# Patient Record
Sex: FEMALE | Race: BLACK OR AFRICAN AMERICAN | NOT HISPANIC OR LATINO | Employment: UNEMPLOYED | URBAN - METROPOLITAN AREA
[De-identification: names, ages, dates, MRNs, and addresses within clinical notes are randomized per-mention and may not be internally consistent; named-entity substitution may affect disease eponyms.]

---

## 2017-07-10 ENCOUNTER — HISTORICAL (OUTPATIENT)
Dept: ADMINISTRATIVE | Facility: HOSPITAL | Age: 52
End: 2017-07-10

## 2017-07-10 LAB
ALBUMIN SERPL-MCNC: 3.6 GM/DL (ref 3.4–5)
ALBUMIN/GLOB SERPL: 1 RATIO (ref 1–2)
ALP SERPL-CCNC: 70 UNIT/L (ref 45–117)
ALT SERPL-CCNC: 15 UNIT/L (ref 12–78)
AST SERPL-CCNC: 16 UNIT/L (ref 15–37)
BILIRUB SERPL-MCNC: 0.4 MG/DL (ref 0.2–1)
BILIRUBIN DIRECT+TOT PNL SERPL-MCNC: 0.1 MG/DL
BILIRUBIN DIRECT+TOT PNL SERPL-MCNC: 0.3 MG/DL
BUN SERPL-MCNC: 13 MG/DL (ref 7–18)
CALCIUM SERPL-MCNC: 8.4 MG/DL (ref 8.5–10.1)
CHLORIDE SERPL-SCNC: 107 MMOL/L (ref 98–107)
CO2 SERPL-SCNC: 28 MMOL/L (ref 21–32)
CREAT SERPL-MCNC: 0.8 MG/DL (ref 0.6–1.3)
DEPRECATED CALCIDIOL+CALCIFEROL SERPL-MC: 23.3 NG/ML (ref 30–80)
GLOBULIN SER-MCNC: 3.8 GM/ML (ref 2.3–3.5)
GLUCOSE SERPL-MCNC: 91 MG/DL (ref 74–106)
POTASSIUM SERPL-SCNC: 3.7 MMOL/L (ref 3.5–5.1)
PROT SERPL-MCNC: 7.4 GM/DL (ref 6.4–8.2)
SODIUM SERPL-SCNC: 141 MMOL/L (ref 136–145)
TSH SERPL-ACNC: 1.85 MIU/L (ref 0.36–3.74)

## 2017-07-17 LAB
COLOR STL: NORMAL
CONSISTENCY STL: NORMAL
HEMOCCULT SP1 STL QL: NEGATIVE

## 2017-07-18 LAB
COLOR STL: NORMAL
CONSISTENCY STL: NORMAL
HEMOCCULT SP2 STL QL: NEGATIVE

## 2017-07-21 ENCOUNTER — HISTORICAL (OUTPATIENT)
Dept: INTERNAL MEDICINE | Facility: CLINIC | Age: 52
End: 2017-07-21

## 2017-07-21 LAB
COLOR STL: NORMAL
CONSISTENCY STL: NORMAL

## 2017-07-28 ENCOUNTER — HISTORICAL (OUTPATIENT)
Dept: RADIOLOGY | Facility: HOSPITAL | Age: 52
End: 2017-07-28

## 2017-10-26 ENCOUNTER — HOSPITAL ENCOUNTER (INPATIENT)
Facility: HOSPITAL | Age: 52
LOS: 2 days | Discharge: HOME | DRG: 065 | End: 2017-10-28
Attending: EMERGENCY MEDICINE | Admitting: PSYCHIATRY & NEUROLOGY
Payer: MEDICAID

## 2017-10-26 DIAGNOSIS — Z92.82 RECEIVED TISSUE PLASMINOGEN ACTIVATOR (T-PA) LESS THAN 24 HOURS PRIOR TO ARRIVAL: ICD-10-CM

## 2017-10-26 DIAGNOSIS — Z92.82 TISSUE PLASMINOGEN ACTIVATOR (T-PA) ADMINISTERED AT OTHER FACILITY WITHIN 24 HOURS PRIOR TO CURRENT ADMISSION: ICD-10-CM

## 2017-10-26 DIAGNOSIS — R47.01 APHASIA: ICD-10-CM

## 2017-10-26 DIAGNOSIS — R20.2 NUMBNESS AND TINGLING OF RIGHT UPPER EXTREMITY: ICD-10-CM

## 2017-10-26 DIAGNOSIS — R20.0 NUMBNESS AND TINGLING OF RIGHT UPPER EXTREMITY: ICD-10-CM

## 2017-10-26 DIAGNOSIS — R53.1 RIGHT SIDED WEAKNESS: ICD-10-CM

## 2017-10-26 DIAGNOSIS — I63.9 ISCHEMIC STROKE: ICD-10-CM

## 2017-10-26 DIAGNOSIS — I63.512 ACUTE ISCHEMIC LEFT MCA STROKE: ICD-10-CM

## 2017-10-26 DIAGNOSIS — I63.9 CEREBROVASCULAR ACCIDENT (CVA), UNSPECIFIED MECHANISM: Primary | ICD-10-CM

## 2017-10-26 LAB
ABO + RH BLD: NORMAL
ALBUMIN SERPL BCP-MCNC: 3.6 G/DL
ALP SERPL-CCNC: 65 U/L
ALT SERPL W/O P-5'-P-CCNC: 11 U/L
ANION GAP SERPL CALC-SCNC: 6 MMOL/L
APTT BLDCRRT: <21 SEC
AST SERPL-CCNC: 21 U/L
BASOPHILS # BLD AUTO: 0.02 K/UL
BASOPHILS NFR BLD: 0.3 %
BILIRUB SERPL-MCNC: 0.5 MG/DL
BLD GP AB SCN CELLS X3 SERPL QL: NORMAL
BUN SERPL-MCNC: 12 MG/DL
CALCIUM SERPL-MCNC: 8.3 MG/DL
CHLORIDE SERPL-SCNC: 110 MMOL/L
CHOLEST SERPL-MCNC: 196 MG/DL
CHOLEST/HDLC SERPL: 3.8 {RATIO}
CK MB SERPL-MCNC: 0.7 NG/ML
CK MB SERPL-RTO: 0.6 %
CK SERPL-CCNC: 114 U/L
CO2 SERPL-SCNC: 20 MMOL/L
CREAT SERPL-MCNC: 0.8 MG/DL
DIFFERENTIAL METHOD: ABNORMAL
EOSINOPHIL # BLD AUTO: 0.1 K/UL
EOSINOPHIL NFR BLD: 1 %
ERYTHROCYTE [DISTWIDTH] IN BLOOD BY AUTOMATED COUNT: 17.1 %
EST. GFR  (AFRICAN AMERICAN): >60 ML/MIN/1.73 M^2
EST. GFR  (NON AFRICAN AMERICAN): >60 ML/MIN/1.73 M^2
GLUCOSE SERPL-MCNC: 97 MG/DL
GLUCOSE SERPL-MCNC: 98 MG/DL (ref 70–110)
HCT VFR BLD AUTO: 37.7 %
HDLC SERPL-MCNC: 52 MG/DL
HDLC SERPL: 26.5 %
HGB BLD-MCNC: 12.3 G/DL
IMM GRANULOCYTES # BLD AUTO: 0.02 K/UL
IMM GRANULOCYTES NFR BLD AUTO: 0.3 %
INR PPP: 1.1
LDLC SERPL CALC-MCNC: 133.6 MG/DL
LYMPHOCYTES # BLD AUTO: 1.4 K/UL
LYMPHOCYTES NFR BLD: 17.9 %
MCH RBC QN AUTO: 27.6 PG
MCHC RBC AUTO-ENTMCNC: 32.6 G/DL
MCV RBC AUTO: 85 FL
MONOCYTES # BLD AUTO: 0.4 K/UL
MONOCYTES NFR BLD: 5.6 %
NEUTROPHILS # BLD AUTO: 5.8 K/UL
NEUTROPHILS NFR BLD: 74.9 %
NONHDLC SERPL-MCNC: 144 MG/DL
NRBC BLD-RTO: 0 /100 WBC
PLATELET # BLD AUTO: 259 K/UL
PMV BLD AUTO: 10 FL
POCT GLUCOSE: 98 MG/DL (ref 70–110)
POTASSIUM SERPL-SCNC: 4 MMOL/L
PROT SERPL-MCNC: 6.9 G/DL
PROTHROMBIN TIME: 11.5 SEC
RBC # BLD AUTO: 4.46 M/UL
SODIUM SERPL-SCNC: 136 MMOL/L
TRIGL SERPL-MCNC: 52 MG/DL
TROPONIN I SERPL DL<=0.01 NG/ML-MCNC: <0.006 NG/ML
TSH SERPL DL<=0.005 MIU/L-ACNC: 0.95 UIU/ML
WBC # BLD AUTO: 7.7 K/UL

## 2017-10-26 PROCEDURE — 84443 ASSAY THYROID STIM HORMONE: CPT

## 2017-10-26 PROCEDURE — 96365 THER/PROPH/DIAG IV INF INIT: CPT

## 2017-10-26 PROCEDURE — 80061 LIPID PANEL: CPT

## 2017-10-26 PROCEDURE — 25000003 PHARM REV CODE 250: Performed by: EMERGENCY MEDICINE

## 2017-10-26 PROCEDURE — 86900 BLOOD TYPING SEROLOGIC ABO: CPT

## 2017-10-26 PROCEDURE — A4216 STERILE WATER/SALINE, 10 ML: HCPCS | Performed by: PHYSICIAN ASSISTANT

## 2017-10-26 PROCEDURE — 63600175 PHARM REV CODE 636 W HCPCS: Performed by: EMERGENCY MEDICINE

## 2017-10-26 PROCEDURE — 99285 EMERGENCY DEPT VISIT HI MDM: CPT | Mod: 25

## 2017-10-26 PROCEDURE — 25500020 PHARM REV CODE 255: Performed by: EMERGENCY MEDICINE

## 2017-10-26 PROCEDURE — 85730 THROMBOPLASTIN TIME PARTIAL: CPT

## 2017-10-26 PROCEDURE — 93010 ELECTROCARDIOGRAM REPORT: CPT | Mod: ,,, | Performed by: INTERNAL MEDICINE

## 2017-10-26 PROCEDURE — 80053 COMPREHEN METABOLIC PANEL: CPT

## 2017-10-26 PROCEDURE — 84484 ASSAY OF TROPONIN QUANT: CPT

## 2017-10-26 PROCEDURE — 20000000 HC ICU ROOM

## 2017-10-26 PROCEDURE — 96368 THER/DIAG CONCURRENT INF: CPT

## 2017-10-26 PROCEDURE — 85610 PROTHROMBIN TIME: CPT

## 2017-10-26 PROCEDURE — 82553 CREATINE MB FRACTION: CPT

## 2017-10-26 PROCEDURE — 99233 SBSQ HOSP IP/OBS HIGH 50: CPT | Mod: ,,, | Performed by: PSYCHIATRY & NEUROLOGY

## 2017-10-26 PROCEDURE — 25000003 PHARM REV CODE 250: Performed by: PHYSICIAN ASSISTANT

## 2017-10-26 PROCEDURE — 86901 BLOOD TYPING SEROLOGIC RH(D): CPT

## 2017-10-26 PROCEDURE — 82962 GLUCOSE BLOOD TEST: CPT

## 2017-10-26 PROCEDURE — 99222 1ST HOSP IP/OBS MODERATE 55: CPT | Mod: ,,, | Performed by: PSYCHIATRY & NEUROLOGY

## 2017-10-26 PROCEDURE — 25000003 PHARM REV CODE 250

## 2017-10-26 PROCEDURE — 80307 DRUG TEST PRSMV CHEM ANLYZR: CPT

## 2017-10-26 PROCEDURE — 96366 THER/PROPH/DIAG IV INF ADDON: CPT

## 2017-10-26 PROCEDURE — 81001 URINALYSIS AUTO W/SCOPE: CPT

## 2017-10-26 PROCEDURE — 83036 HEMOGLOBIN GLYCOSYLATED A1C: CPT

## 2017-10-26 PROCEDURE — 99285 EMERGENCY DEPT VISIT HI MDM: CPT | Mod: ,,, | Performed by: EMERGENCY MEDICINE

## 2017-10-26 PROCEDURE — 85025 COMPLETE CBC W/AUTO DIFF WBC: CPT

## 2017-10-26 RX ORDER — ACETAMINOPHEN 10 MG/ML
1000 INJECTION, SOLUTION INTRAVENOUS ONCE
Status: COMPLETED | OUTPATIENT
Start: 2017-10-26 | End: 2017-10-26

## 2017-10-26 RX ORDER — NICARDIPINE HYDROCHLORIDE 0.2 MG/ML
INJECTION INTRAVENOUS
Status: COMPLETED
Start: 2017-10-26 | End: 2017-10-26

## 2017-10-26 RX ORDER — NICARDIPINE HYDROCHLORIDE 0.2 MG/ML
3 INJECTION INTRAVENOUS CONTINUOUS
Status: DISCONTINUED | OUTPATIENT
Start: 2017-10-26 | End: 2017-10-27

## 2017-10-26 RX ORDER — LABETALOL HYDROCHLORIDE 5 MG/ML
10 INJECTION, SOLUTION INTRAVENOUS
Status: DISCONTINUED | OUTPATIENT
Start: 2017-10-26 | End: 2017-10-27

## 2017-10-26 RX ORDER — SODIUM CHLORIDE 0.9 % (FLUSH) 0.9 %
3 SYRINGE (ML) INJECTION EVERY 8 HOURS
Status: DISCONTINUED | OUTPATIENT
Start: 2017-10-26 | End: 2017-10-28 | Stop reason: HOSPADM

## 2017-10-26 RX ORDER — NOREPINEPHRINE BITARTRATE/D5W 4MG/250ML
PLASTIC BAG, INJECTION (ML) INTRAVENOUS
Status: DISPENSED
Start: 2017-10-26 | End: 2017-10-27

## 2017-10-26 RX ADMIN — NICARDIPINE HYDROCHLORIDE 1.5 MG/HR: 0.2 INJECTION, SOLUTION INTRAVENOUS at 12:10

## 2017-10-26 RX ADMIN — IOHEXOL 100 ML: 350 INJECTION, SOLUTION INTRAVENOUS at 11:10

## 2017-10-26 RX ADMIN — ACETAMINOPHEN 1000 MG: 10 INJECTION, SOLUTION INTRAVENOUS at 04:10

## 2017-10-26 RX ADMIN — NICARDIPINE HYDROCHLORIDE 3 MG/HR: 0.2 INJECTION, SOLUTION INTRAVENOUS at 12:10

## 2017-10-26 RX ADMIN — Medication 3 ML: at 09:10

## 2017-10-26 NOTE — HPI
53 y/o female with sudden onset of aphasia and right sided paresis @ 0730 today. In ED at [facility] was given tPA. Brought here for CTA multiphase.

## 2017-10-26 NOTE — ASSESSMENT & PLAN NOTE
Left MCA stroke  Antithrombotics for secondary stroke prevention: Antiplatelets:  Aspirin: 325 mg oral now and daily, Statins for secondary stroke prevention and hyperlipidemia, if present: Atorvastatin- 40 mg oral daily, Aggressive risk factor modification: Hypertension and Smoking, Rehab Efforts: Occupational Therapy, Speech and Language Pathology and Physical Medicine and Rehabilitation, Diagnostics: Ordered/Pending Carotid ultrasound to assess vasculature, HgbA1C to assess blood glucose levels, Lipid Profile to assess cholesterol levels, Outpatient Stroke Coagulation Panel to assess coagulation status and VTE Prophylaxis: None: Reason for No Pharmacological VTE Prophylaxis: Mechanical prophylaxis: Place SCDs

## 2017-10-26 NOTE — SUBJECTIVE & OBJECTIVE
Past Medical History:   Diagnosis Date    Hypertension      History reviewed. No pertinent surgical history.  History reviewed. No pertinent family history.  Social History   Substance Use Topics    Smoking status: Current Every Day Smoker     Packs/day: 0.50     Types: Cigarettes    Smokeless tobacco: Not on file    Alcohol use Not on file     Review of patient's allergies indicates:  No Known Allergies  Medications: I have reviewed the current medication administration record.      (Not in a hospital admission)    Review of Systems   Unable to perform ROS: Patient nonverbal     Objective:     Vital Signs (Most Recent):  Temp: 98.7 °F (37.1 °C) (10/26/17 1138)  Pulse: 73 (10/26/17 1300)  Resp: 18 (10/26/17 1300)  BP: 119/76 (10/26/17 1300)  SpO2: 98 % (10/26/17 1300)    Vital Signs Range (Last 24H):  Temp:  [98.7 °F (37.1 °C)]   Pulse:  [73-98]   Resp:  [18]   BP: (119-170)/()   SpO2:  [95 %-100 %]     Physical Exam   Constitutional: She appears well-developed and well-nourished.   HENT:   Head: Normocephalic and atraumatic.   Eyes: EOM are normal. Pupils are equal, round, and reactive to light.   Neck: Normal range of motion. Neck supple.   Cardiovascular: Normal rate and regular rhythm.    Pulmonary/Chest: Effort normal.   Musculoskeletal: Normal range of motion.   Neurological: She is alert.   Skin: Skin is warm and dry.   Psychiatric: She is withdrawn. She exhibits a depressed mood.   Vitals reviewed.      Neurological Exam:   LOC: follows requests  Language: No aphasia, Mute  Speech: No dysarthria, Mute  Visual Fields (CN II): Full  Pupils (CN III, IV, VI): PERRL  Facial Sensation (CN V): Symmetric  Facial Movement (CN VII): symmetric facial expression  Motor*: Arm Left:  Normal (5/5), Leg Left:   Normal (5/5), Arm Right:   Plegic (0/5), Leg Right:   Plegic (0/5)    NIH Stroke Scale:  Interval: baseline (upon arrival/admit)  Level of Consciousness: 0 - alert  LOC Questions: 2 - answers none  correctly  LOC Commands: 0 - performs both correctly  Best Gaze: 0 - normal  Visual: 0 - no visual loss  Facial Palsy: 0 - normal  Motor Left Arm: 0 - no drift  Motor Right Arm: 3 - no effort against gravity  Motor Left Le - no drift  Motor Right Leg: 3 - no effort against gravity  Limb Ataxia: 0 - absent  Sensory: 1 - mild to moderate loss  Best Language: 3 - mute  Dysarthria: 2 - near to unintelligible  Extinction and Inattention: 0 - no neglect  NIH Stroke Scale Total: 14      Laboratory:  CMP:   Recent Labs  Lab 10/26/17  1209   CALCIUM 8.3*   ALBUMIN 3.6   PROT 6.9      K 4.0   CO2 20*      BUN 12   CREATININE 0.8   ALKPHOS 65   ALT 11   AST 21   BILITOT 0.5     CBC:   Recent Labs  Lab 10/26/17  1209   WBC 7.70   RBC 4.46   HGB 12.3   HCT 37.7      MCV 85   MCH 27.6   MCHC 32.6     Lipid Panel:   Recent Labs  Lab 10/26/17  1209   CHOL 196   LDLCALC 133.6   HDL 52   TRIG 52       Diagnostic Results:  Brain Imaging: CT Head. Date: 10/26/2017  Acute Pathology: None  Location: N/A  Old Vascular Pathology: None  Location: N/A    Cerebrovascular Imaging: CTA Head. Date: 10/26/17  Intracranial Pathology: None  Location: N/A

## 2017-10-26 NOTE — ED NOTES
Pt assisted onto bed pan.  Perineal care given.  Pt repositioned for comfort.  Side rails up.  Will continue to monitor.

## 2017-10-26 NOTE — ED NOTES
Patient identifiers have been checked and are correct.      LOC: The patient is awake, alert, and aware of environment.   APPEARANCE: No acute distress noted.   PSYCHOSOCIAL: Patient is calm and cooperative.   SKIN: The skin is warm, dry, and intact. No bruising/discolorations noted.  RESPIRATORY: Airway is open and patent. Bilateral chest rise and fall. Respirations are spontaneous, even and unlabored. Normal effort and rate noted. No accessory muscle use noted. Breath sounds clear bilaterally.   CARDIAC: Patient has a normal rate and rhythm.   ABDOMEN: Soft and non tender to palpation. No distention noted. Bowel sounds present.  URINARY: Voids independently.   NEUROLOGIC: See neuro flow sheet.   MUSCULOSKELETAL: No obvious deformities noted.     Side rails up x2. Call light within reach. Will continue to monitor.

## 2017-10-26 NOTE — ED TRIAGE NOTES
Transfer from [place] for neuro consult. Pt began having right sided weakness and aphasia noted at 0730. Pt seen in ED at 0826, TPA given at 0900- 7 mg Bolus and 62.6 mg Infusion. TPA completed at 1000 at previous facility. Pt arrives on Cardene Infusion @ 3mg/hr.

## 2017-10-26 NOTE — CONSULTS
[facility]  Vascular Neurology  Comprehensive Stroke Center  Consult Note    Consults  Assessment/Plan:     Patient is a 52 y.o. year old female with:    Acute ischemic left MCA stroke    Left MCA stroke  Antithrombotics for secondary stroke prevention: Antiplatelets:  Aspirin: 325 mg oral now and daily, Statins for secondary stroke prevention and hyperlipidemia, if present: Atorvastatin- 40 mg oral daily, Aggressive risk factor modification: Hypertension and Smoking, Rehab Efforts: Occupational Therapy, Speech and Language Pathology and Physical Medicine and Rehabilitation, Diagnostics: Ordered/Pending Carotid ultrasound to assess vasculature, HgbA1C to assess blood glucose levels, Lipid Profile to assess cholesterol levels, Outpatient Stroke Coagulation Panel to assess coagulation status and VTE Prophylaxis: None: Reason for No Pharmacological VTE Prophylaxis: Mechanical prophylaxis: Place SCDs                                        Thrombolysis Candidate? Yes. Yes. The risks and benefits of tPA were discussed with the patient and/or family. The patient and/or family verbalized understanding of the risks and benefits and has given verbal consent for tPA. If patient was not competent or no family was available, treatment will be administered as an emergency procedure and in what we believe to be the patient's best interest.  1. Contraindications: No  2.     Interventional Revascularization Candidate?  Yes and No; No significant neurological deficit    Research Candidate? No-->   Modified Black Diamond Score - 0  Subjective:     History of Present Illness:  51 y/o female with sudden onset of aphasia and right sided paresis @ 0730 today. In ED at [facility] was given tPA. Brought here for CTA multiphase.         Past Medical History:   Diagnosis Date    Hypertension      History reviewed. No pertinent surgical history.  History reviewed. No pertinent family history.  Social History   Substance Use Topics     Smoking status: Current Every Day Smoker     Packs/day: 0.50     Types: Cigarettes    Smokeless tobacco: Not on file    Alcohol use Not on file     Review of patient's allergies indicates:  No Known Allergies  Medications: I have reviewed the current medication administration record.      (Not in a hospital admission)    Review of Systems   Unable to perform ROS: Patient nonverbal     Objective:     Vital Signs (Most Recent):  Temp: 98.7 °F (37.1 °C) (10/26/17 1138)  Pulse: 73 (10/26/17 1300)  Resp: 18 (10/26/17 1300)  BP: 119/76 (10/26/17 1300)  SpO2: 98 % (10/26/17 1300)    Vital Signs Range (Last 24H):  Temp:  [98.7 °F (37.1 °C)]   Pulse:  [73-98]   Resp:  [18]   BP: (119-170)/()   SpO2:  [95 %-100 %]     Physical Exam   Constitutional: She appears well-developed and well-nourished.   HENT:   Head: Normocephalic and atraumatic.   Eyes: EOM are normal. Pupils are equal, round, and reactive to light.   Neck: Normal range of motion. Neck supple.   Cardiovascular: Normal rate and regular rhythm.    Pulmonary/Chest: Effort normal.   Musculoskeletal: Normal range of motion.   Neurological: She is alert.   Skin: Skin is warm and dry.   Psychiatric: She is withdrawn. She exhibits a depressed mood.   Vitals reviewed.      Neurological Exam:   LOC: follows requests  Language: No aphasia, Mute  Speech: No dysarthria, Mute  Visual Fields (CN II): Full  Pupils (CN III, IV, VI): PERRL  Facial Sensation (CN V): Symmetric  Facial Movement (CN VII): symmetric facial expression  Motor*: Arm Left:  Normal (5/5), Leg Left:   Normal (5/5), Arm Right:   Plegic (0/5), Leg Right:   Plegic (0/5)    NIH Stroke Scale:  Interval: baseline (upon arrival/admit)  Level of Consciousness: 0 - alert  LOC Questions: 2 - answers none correctly  LOC Commands: 0 - performs both correctly  Best Gaze: 0 - normal  Visual: 0 - no visual loss  Facial Palsy: 0 - normal  Motor Left Arm: 0 - no drift  Motor Right Arm: 3 - no effort against  gravity  Motor Left Le - no drift  Motor Right Leg: 3 - no effort against gravity  Limb Ataxia: 0 - absent  Sensory: 1 - mild to moderate loss  Best Language: 3 - mute  Dysarthria: 2 - near to unintelligible  Extinction and Inattention: 0 - no neglect  NIH Stroke Scale Total: 14      Laboratory:  CMP:   Recent Labs  Lab 10/26/17  1209   CALCIUM 8.3*   ALBUMIN 3.6   PROT 6.9      K 4.0   CO2 20*      BUN 12   CREATININE 0.8   ALKPHOS 65   ALT 11   AST 21   BILITOT 0.5     CBC:   Recent Labs  Lab 10/26/17  1209   WBC 7.70   RBC 4.46   HGB 12.3   HCT 37.7      MCV 85   MCH 27.6   MCHC 32.6     Lipid Panel:   Recent Labs  Lab 10/26/17  1209   CHOL 196   LDLCALC 133.6   HDL 52   TRIG 52       Diagnostic Results:  Brain Imaging: CT Head. Date: 10/26/2017  Acute Pathology: None  Location: N/A  Old Vascular Pathology: None  Location: N/A    Cerebrovascular Imaging: CTA Head. Date: 10/26/17  Intracranial Pathology: None  Location: N/A          Bernie Dobbs MD  Comprehensive Stroke Center  Department of Vascular Neurology   [facility]

## 2017-10-26 NOTE — PROGRESS NOTES
Patient arrived to [facility] from [facility] by ([facility]  Type of Stroke: ischemic     TPA start time 0900 and end time 1000    Patients current symptoms include right sided weakness and aphasia

## 2017-10-26 NOTE — ED PROVIDER NOTES
Encounter Date: 10/26/2017    SCRIBE #1 NOTE: Gus IRIZARRY am scribing for, and in the presence of, Ced Maldonado MD.       History     Chief Complaint   Patient presents with    Transfer/ TPA patient     Time seen by provider: 11:48 AM    This is a 52 y.o. female who presents as stroke transfer. Patient initially evaluated at [facility], where she presented with right-sided weakness and aphasia. Patient received TPA, [facility] was consulted, and patient was transferred here for further evaluation. Patient was transferred by air on Nicardipine drip. According to chart review, patient developed weakness in the right leg which progressed to her right arm and aphasia at 7:30 AM. Patient also has history of bariatric surgery and tobacco use. Labs done at outside facility were unremarkable.      The history is provided by medical records.     Review of patient's allergies indicates:  No Known Allergies  Past Medical History:   Diagnosis Date    Hypertension      History reviewed. No pertinent surgical history.  History reviewed. No pertinent family history.  Social History   Substance Use Topics    Smoking status: Current Every Day Smoker     Packs/day: 0.50     Types: Cigarettes    Smokeless tobacco: Not on file    Alcohol use Not on file     Review of Systems   Unable to perform ROS: Patient nonverbal       Physical Exam     Initial Vitals [10/26/17 1138]   BP Pulse Resp Temp SpO2   137/89 93 18 98.7 °F (37.1 °C) 95 %      MAP       105         Physical Exam    Nursing note and vitals reviewed.  Constitutional: She appears well-developed and well-nourished. She is not diaphoretic. No distress.   HENT:   Head: Normocephalic and atraumatic.   Mouth/Throat: Oropharynx is clear and moist.   Eyes: Conjunctivae and EOM are normal.   Neck: Normal range of motion. Neck supple. No JVD present.   Cardiovascular: Normal rate, regular rhythm, normal heart sounds and intact distal pulses. Exam reveals no gallop and no  friction rub.    No murmur heard.  Pulmonary/Chest: She has no wheezes. She has no rhonchi. She has no rales.   Abdominal: Soft. Bowel sounds are normal. She exhibits no distension and no mass. There is no tenderness. There is no rebound and no guarding.   Musculoskeletal: Normal range of motion. She exhibits no tenderness.   Lymphadenopathy:     She has no cervical adenopathy.   Neurological: She is alert. No sensory deficit.   2/5 strength in right upper extremity. No verbal response, but nods and shakes head appropriately to questions; symmetric smile    Skin: Skin is warm and dry. Capillary refill takes less than 2 seconds.   Psychiatric: She has a normal mood and affect.         ED Course   Procedures  Labs Reviewed   CBC W/ AUTO DIFFERENTIAL - Abnormal; Notable for the following:        Result Value    RDW 17.1 (*)     Gran% 74.9 (*)     Lymph% 17.9 (*)     All other components within normal limits   COMPREHENSIVE METABOLIC PANEL - Abnormal; Notable for the following:     CO2 20 (*)     Calcium 8.3 (*)     Anion Gap 6 (*)     All other components within normal limits   PROTIME-INR   TSH   LIPID PANEL   POCT GLUCOSE   TYPE & SCREEN     EKG Readings: (Independently Interpreted)   Normal sinus rhythm, rate of 83 bpm, normal axis, T-wave inversions in AVL, normal T-waves.           Medical Decision Making:   History:   Old Medical Records: I decided to obtain old medical records.  Initial Assessment:   52 y.o. female presenting for neuro evaluation for acute CVA s/p TPA. Stroke code was activated.    CTA Multiphase reveals:  1.  No acute hemorrhage or major vascular infarct  2.  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  3.  Minimal atherosclerosis of the right carotid bifurcation without major stenosis.    Pr Dr. Dobbs of Neurology, patient is not a candidate for neuro intervention.  She will be admitted to Neuro Critical Care. Labs unremarkable.    Independently Interpreted Test(s):   I have ordered  and independently interpreted EKG Reading(s) - see prior notes  Clinical Tests:   Lab Tests: Reviewed and Ordered  Radiological Study: Reviewed and Ordered  Medical Tests: Reviewed and Ordered            Scribe Attestation:   Scribe #1: I performed the above scribed service and the documentation accurately describes the services I performed. I attest to the accuracy of the note.    Attending Attestation:           Physician Attestation for Scribe:      Comments: I, Dr. Ced Maldonado, personally performed the services described in this documentation. All medical record entries made by the scribe were at my direction and in my presence.  I have reviewed the chart and agree that the record reflects my personal performance and is accurate and complete. Ced Maldonado MD.  2:22 PM 10/26/2017              ED Course      Clinical Impression:   The encounter diagnosis was Cerebrovascular accident (CVA), unspecified mechanism.    Disposition:   Disposition: Admitted                        Ced Maldonado MD  10/26/17 1422       Ced Maldonado MD  10/26/17 1423

## 2017-10-27 PROBLEM — R47.01 APHASIA: Status: RESOLVED | Noted: 2017-10-27 | Resolved: 2017-10-27

## 2017-10-27 PROBLEM — I63.9 ISCHEMIC STROKE: Status: ACTIVE | Noted: 2017-10-27

## 2017-10-27 PROBLEM — R20.2 NUMBNESS AND TINGLING OF RIGHT UPPER EXTREMITY: Status: RESOLVED | Noted: 2017-10-27 | Resolved: 2017-10-27

## 2017-10-27 PROBLEM — I63.512 ACUTE ISCHEMIC LEFT MCA STROKE: Status: ACTIVE | Noted: 2017-10-27

## 2017-10-27 PROBLEM — I10 HTN (HYPERTENSION): Status: ACTIVE | Noted: 2017-10-27

## 2017-10-27 PROBLEM — R20.0 NUMBNESS AND TINGLING OF RIGHT UPPER EXTREMITY: Status: ACTIVE | Noted: 2017-10-27

## 2017-10-27 PROBLEM — R20.2 NUMBNESS AND TINGLING OF RIGHT UPPER EXTREMITY: Status: ACTIVE | Noted: 2017-10-27

## 2017-10-27 PROBLEM — R53.1 RIGHT SIDED WEAKNESS: Status: RESOLVED | Noted: 2017-10-27 | Resolved: 2017-10-27

## 2017-10-27 PROBLEM — R47.01 APHASIA: Status: ACTIVE | Noted: 2017-10-27

## 2017-10-27 PROBLEM — F17.200 SMOKING: Status: ACTIVE | Noted: 2017-10-27

## 2017-10-27 PROBLEM — R53.1 RIGHT SIDED WEAKNESS: Status: ACTIVE | Noted: 2017-10-27

## 2017-10-27 PROBLEM — R20.0 NUMBNESS AND TINGLING OF RIGHT UPPER EXTREMITY: Status: RESOLVED | Noted: 2017-10-27 | Resolved: 2017-10-27

## 2017-10-27 LAB
ALBUMIN SERPL BCP-MCNC: 3.3 G/DL
ALP SERPL-CCNC: 62 U/L
ALT SERPL W/O P-5'-P-CCNC: 10 U/L
ANION GAP SERPL CALC-SCNC: 9 MMOL/L
APTT BLDCRRT: <21 SEC
AST SERPL-CCNC: 16 U/L
BACTERIA #/AREA URNS AUTO: NORMAL /HPF
BASOPHILS # BLD AUTO: 0.02 K/UL
BASOPHILS NFR BLD: 0.4 %
BILIRUB SERPL-MCNC: 0.7 MG/DL
BILIRUB UR QL STRIP: NEGATIVE
BUN SERPL-MCNC: 10 MG/DL
CALCIUM SERPL-MCNC: 9.1 MG/DL
CHLORIDE SERPL-SCNC: 110 MMOL/L
CLARITY UR REFRACT.AUTO: ABNORMAL
CO2 SERPL-SCNC: 22 MMOL/L
COLOR UR AUTO: YELLOW
CREAT SERPL-MCNC: 0.9 MG/DL
DIASTOLIC DYSFUNCTION: NO
DIFFERENTIAL METHOD: ABNORMAL
EOSINOPHIL # BLD AUTO: 0.1 K/UL
EOSINOPHIL NFR BLD: 2.1 %
ERYTHROCYTE [DISTWIDTH] IN BLOOD BY AUTOMATED COUNT: 17.2 %
EST. GFR  (AFRICAN AMERICAN): >60 ML/MIN/1.73 M^2
EST. GFR  (NON AFRICAN AMERICAN): >60 ML/MIN/1.73 M^2
ESTIMATED AVG GLUCOSE: 91 MG/DL
GLUCOSE SERPL-MCNC: 86 MG/DL
GLUCOSE UR QL STRIP: NEGATIVE
HBA1C MFR BLD HPLC: 4.8 %
HCT VFR BLD AUTO: 35.1 %
HGB BLD-MCNC: 11.2 G/DL
HGB UR QL STRIP: ABNORMAL
IMM GRANULOCYTES # BLD AUTO: 0.01 K/UL
IMM GRANULOCYTES NFR BLD AUTO: 0.2 %
KETONES UR QL STRIP: NEGATIVE
LEUKOCYTE ESTERASE UR QL STRIP: NEGATIVE
LYMPHOCYTES # BLD AUTO: 2.2 K/UL
LYMPHOCYTES NFR BLD: 37.7 %
MAGNESIUM SERPL-MCNC: 1.8 MG/DL
MCH RBC QN AUTO: 27.3 PG
MCHC RBC AUTO-ENTMCNC: 31.9 G/DL
MCV RBC AUTO: 85 FL
MICROSCOPIC COMMENT: NORMAL
MONOCYTES # BLD AUTO: 0.5 K/UL
MONOCYTES NFR BLD: 9.3 %
NEUTROPHILS # BLD AUTO: 2.9 K/UL
NEUTROPHILS NFR BLD: 50.3 %
NITRITE UR QL STRIP: NEGATIVE
NRBC BLD-RTO: 0 /100 WBC
PH UR STRIP: 5 [PH] (ref 5–8)
PHOSPHATE SERPL-MCNC: 4 MG/DL
PLATELET # BLD AUTO: 242 K/UL
PMV BLD AUTO: 10.2 FL
POCT GLUCOSE: 101 MG/DL (ref 70–110)
POCT GLUCOSE: 79 MG/DL (ref 70–110)
POCT GLUCOSE: 84 MG/DL (ref 70–110)
POTASSIUM SERPL-SCNC: 3.5 MMOL/L
PROT SERPL-MCNC: 6.5 G/DL
PROT UR QL STRIP: NEGATIVE
RBC # BLD AUTO: 4.11 M/UL
RBC #/AREA URNS AUTO: 3 /HPF (ref 0–4)
RETIRED EF AND QEF - SEE NOTES: 65 (ref 55–65)
SODIUM SERPL-SCNC: 141 MMOL/L
SP GR UR STRIP: >1.03 (ref 1–1.03)
SQUAMOUS #/AREA URNS AUTO: 10 /HPF
URN SPEC COLLECT METH UR: ABNORMAL
UROBILINOGEN UR STRIP-ACNC: NEGATIVE EU/DL
WBC # BLD AUTO: 5.7 K/UL
WBC #/AREA URNS AUTO: 3 /HPF (ref 0–5)

## 2017-10-27 PROCEDURE — A4216 STERILE WATER/SALINE, 10 ML: HCPCS | Performed by: PHYSICIAN ASSISTANT

## 2017-10-27 PROCEDURE — 85730 THROMBOPLASTIN TIME PARTIAL: CPT

## 2017-10-27 PROCEDURE — 85025 COMPLETE CBC W/AUTO DIFF WBC: CPT

## 2017-10-27 PROCEDURE — 80053 COMPREHEN METABOLIC PANEL: CPT

## 2017-10-27 PROCEDURE — 93306 TTE W/DOPPLER COMPLETE: CPT

## 2017-10-27 PROCEDURE — G8996 SWALLOW CURRENT STATUS: HCPCS | Mod: CH

## 2017-10-27 PROCEDURE — 20600001 HC STEP DOWN PRIVATE ROOM

## 2017-10-27 PROCEDURE — 83735 ASSAY OF MAGNESIUM: CPT

## 2017-10-27 PROCEDURE — 25000003 PHARM REV CODE 250: Performed by: PHYSICIAN ASSISTANT

## 2017-10-27 PROCEDURE — 63600175 PHARM REV CODE 636 W HCPCS: Performed by: STUDENT IN AN ORGANIZED HEALTH CARE EDUCATION/TRAINING PROGRAM

## 2017-10-27 PROCEDURE — 93306 TTE W/DOPPLER COMPLETE: CPT | Mod: 26,,, | Performed by: INTERNAL MEDICINE

## 2017-10-27 PROCEDURE — 92610 EVALUATE SWALLOWING FUNCTION: CPT

## 2017-10-27 PROCEDURE — 25000003 PHARM REV CODE 250: Performed by: STUDENT IN AN ORGANIZED HEALTH CARE EDUCATION/TRAINING PROGRAM

## 2017-10-27 PROCEDURE — G8997 SWALLOW GOAL STATUS: HCPCS | Mod: CH

## 2017-10-27 PROCEDURE — 84100 ASSAY OF PHOSPHORUS: CPT

## 2017-10-27 PROCEDURE — 99233 SBSQ HOSP IP/OBS HIGH 50: CPT | Mod: ,,, | Performed by: PSYCHIATRY & NEUROLOGY

## 2017-10-27 RX ORDER — LABETALOL HYDROCHLORIDE 5 MG/ML
10 INJECTION, SOLUTION INTRAVENOUS
Status: DISCONTINUED | OUTPATIENT
Start: 2017-10-27 | End: 2017-10-28 | Stop reason: HOSPADM

## 2017-10-27 RX ORDER — LABETALOL HYDROCHLORIDE 5 MG/ML
10 INJECTION, SOLUTION INTRAVENOUS
Status: CANCELLED | OUTPATIENT
Start: 2017-10-27

## 2017-10-27 RX ORDER — NAPROXEN SODIUM 220 MG/1
81 TABLET, FILM COATED ORAL DAILY
Status: DISCONTINUED | OUTPATIENT
Start: 2017-10-27 | End: 2017-10-28 | Stop reason: HOSPADM

## 2017-10-27 RX ORDER — HEPARIN SODIUM 5000 [USP'U]/ML
5000 INJECTION, SOLUTION INTRAVENOUS; SUBCUTANEOUS EVERY 8 HOURS
Status: DISCONTINUED | OUTPATIENT
Start: 2017-10-27 | End: 2017-10-28 | Stop reason: HOSPADM

## 2017-10-27 RX ORDER — ATORVASTATIN CALCIUM 20 MG/1
40 TABLET, FILM COATED ORAL DAILY
Status: DISCONTINUED | OUTPATIENT
Start: 2017-10-27 | End: 2017-10-28 | Stop reason: HOSPADM

## 2017-10-27 RX ADMIN — HEPARIN SODIUM 5000 UNITS: 5000 INJECTION, SOLUTION INTRAVENOUS; SUBCUTANEOUS at 11:10

## 2017-10-27 RX ADMIN — ASPIRIN 81 MG CHEWABLE TABLET 81 MG: 81 TABLET CHEWABLE at 02:10

## 2017-10-27 RX ADMIN — HEPARIN SODIUM 5000 UNITS: 5000 INJECTION, SOLUTION INTRAVENOUS; SUBCUTANEOUS at 02:10

## 2017-10-27 RX ADMIN — ATORVASTATIN CALCIUM 40 MG: 20 TABLET, FILM COATED ORAL at 01:10

## 2017-10-27 RX ADMIN — Medication 3 ML: at 05:10

## 2017-10-27 RX ADMIN — Medication 3 ML: at 02:10

## 2017-10-27 RX ADMIN — Medication 3 ML: at 10:10

## 2017-10-27 NOTE — PROGRESS NOTES
[facility]  Vascular Neurology  Comprehensive Stroke Center  Progress Note    Assessment/Plan:     10/27: Day s/p tPA. NAEON. No issue per nursing. Speech and numbness improved. Exam improving. MRI brain pending. SBP well controlled and off cardene. Sating well at RA.Passsed PARTH and pending speech evaluation. AF and WBC WNL. H/H stable. All labs WNL. Pending PT/OT evaluation.    Acute ischemic left MCA stroke    A 52 year old female with PMHx of HTN and smoking who p/w RSW, numbness and aphasia s/p tPA. CTA negative. She was admitted to [facility] for close monitoring after tPA. Speech and LSW/numbness improving.     -- Continue Neurocheck Q1 while in [facility]   -- MRI brain pending   -- Keep SBP <160; BP well controlled and she is off Cardene   -- Antithrombotics for secondary stroke prevention: Antiplatelets: Aspirin: 81 mg oral if MRI negative for hemorrhagic transformation   -- Statins for secondary stroke prevention and hyperlipidemia: Atorvastatin- 40 mg oral daily  -- Aggressive risk factor modification: Hypertension, HLD and Smoking  -- Rehab Efforts: Occupational Therapy, Speech and Language Pathology and Physical Medicine and Rehabilitation: Pending evaluation   -- Speech: she passed PARTH, pending formal speech evaluation   -- Diagnostics: , A1C 4.8, TSH 0.94. Echo pending   --  VTE Prophylaxis: Mechanical prophylaxis for now, SQH pending MRI  -- Okay for stepdown to floor if MRI okay       Numbness and tingling of right upper extremity    Stroke symptome, improving   -- Continue aggressive therapy       Right sided weakness    Stroke symptoms, improving   -- Continue aggressive therapy       Tissue plasminogen activator (t-PA) administered at other facility within 24 hours prior to current admission    -- Her exam improving and SBP well controlled overnight       HTN (hypertension)    -- Keep SBP <160  -- She is off Cardene         Smoking    Stroke risk factor  -- Counseling about smoking  cessation   -- Nicotine patch PRN      Aphasia    Stroke symptom  -- Resolved        Neurologic Chief Complaint: RSW, numbness and aphasia     Subjective:     Interval History: Patient is seen for follow-up neurological assessment and treatment recommendations: Day 1 s/p tPA. NAEON. No issue per nursing. Speech and numbness improved. Exam improving. MRI brain pending. SBP well controlled and off cardene. Sating well at RA.Passsed PARTH and pending speech evaluation. AF and WBC WNL. H/H stable. All labs WNL. Pending PT/OT evaluation.      HPI, Past Medical, Family, and Social History remains the same as documented in the initial encounter.     Review of Systems   She report some numbness in her R hand otherwise negative   Scheduled Meds:   norepinephrine bitartrate-D5W        sodium chloride 0.9%  3 mL Intravenous Q8H     Continuous Infusions:   nicardipine Stopped (10/26/17 1258)     PRN Meds:labetalol    Objective:     Vital Signs (Most Recent):  Temp: 98.4 °F (36.9 °C) (10/27/17 0705)  Pulse: 61 (10/27/17 0905)  Resp: 15 (10/27/17 0905)  BP: 135/68 (10/27/17 0905)  SpO2: 98 % (10/27/17 0905)  BP Location: Left arm    Vital Signs Range (Last 24H):  Temp:  [98.1 °F (36.7 °C)-98.7 °F (37.1 °C)]   Pulse:  [51-98]   Resp:  [13-25]   BP: (114-177)/()   SpO2:  [95 %-100 %]   BP Location: Left arm    Physical Exam  General: appears well-developed and well-nourished.   Eyes: EOMI, PERRLA, normal visual acuity and grossly normal VF  Cardiovascular: normal rate, regular rhythm and normal pulses  Chest: no distress, BEAE  Abdominal: soft, ND, NT, bowel sounds are normal.   Skin: no rash   Psych/Behavior: normal mood and affect.   Musculoskeletal: appropriate tone    Neurological: AAOX3, GCS [id number], following commands, normal speech with mild dysarthria, mild R side pronation, CN II-XII grossly intact and face symm, full strength on the L side, R side 4 to 4+/5, SILT except mild decrease to LT on RUE, DTR symmetrical  +2, finger to nose normal      NIH Stroke Scale:    Level of Consciousness: 0 - alert  LOC Questions: 0 - answers both correctly  LOC Commands: 0 - performs both correctly  Best Gaze: 0 - normal  Visual: 0 - no visual loss  Facial Palsy: 0 - normal  Motor Left Arm: 0 - no drift  Motor Right Arm: 1 - drift  Motor Left Le - no drift  Motor Right Le - no drift  Limb Ataxia: 0 - absent  Sensory: 1 - mild to moderate loss  Best Language: 0 - no aphasia  Dysarthria: 1 - mild to moderate dysarthria  Extinction and Inattention: 0 - no neglect  NIH Stroke Scale Total: 3        Laboratory:  CMP:   Recent Labs  Lab 10/27/17  0300   CALCIUM 9.1   ALBUMIN 3.3*   PROT 6.5      K 3.5   CO2 22*      BUN 10   CREATININE 0.9   ALKPHOS 62   ALT 10   AST 16   BILITOT 0.7     CBC:   Recent Labs  Lab 10/27/17  0300   WBC 5.70   RBC 4.11   HGB 11.2*   HCT 35.1*      MCV 85   MCH 27.3   MCHC 31.9*     Lipid Panel:   Recent Labs  Lab 10/26/17  1209   CHOL 196   LDLCALC 133.6   HDL 52   TRIG 52     Coagulation:   Recent Labs  Lab 10/26/17  1209  10/27/17  0300   INR 1.1  --   --    APTT  --   < > <21.0   < > = values in this interval not displayed.  Platelet Aggregation Study: No results for input(s): PLTAGG, PLTAGINTERP, PLTAGREGLACO, ADPPLTAGGREG in the last 168 hours.  Hgb A1C:   Recent Labs  Lab 10/26/17  2139   HGBA1C 4.8     TSH:   Recent Labs  Lab 10/26/17  1209   TSH 0.947       Diagnostic Results:  I have personally reviewed    CTA 10/26:  No acute hemorrhage or major vascular infarct. CT angiogram of the head and neck demonstrates minimal atherosclerotic disease. No high-grade stenosis or major branch occlusion identified    Echo/EKG : pending     MRI brain: pending       Domenic Estes MD  [facility]  Department of Vascular Neurology   [facility]

## 2017-10-27 NOTE — HOSPITAL COURSE
Received tPA at [facility], transferred here for further evaluation with CTA multiphase, MRI and higher level of care. She was monitored over the course of hospital stay. Speech and numbness improved. No new neurological findings observed.  SBP well controlled and off cardene. Sating well at Ra. stepped down from the NCC on 10/27. speech evaluation cleared for regular diet. AF and WBC WNL. H/H stable. All labs WNL. PT/OT evaluation cleared for discharge to home on 10/28.

## 2017-10-27 NOTE — PROGRESS NOTES
[facility]  Neurocritical Care  Progress Note    Admit Date: 10/26/2017  Service Date: 10/27/2017  Length of Stay: 1    Subjective:     Chief Complaint: Tissue plasminogen activator (t-PA) administered at other facility within 24 hours prior to current admission    History of Present Illness: This is a 52 y.o. female with history of HTN, and bariatric surgery who presented with right-sided weakness and aphasia at OSH where she  received tPA- end time 10 am. Symptoms reportedly sudden onset of R hemiparesis with aphasia. On exam in ED she was awake, tearful, resisting movement to RUE, and poor effort to movement on left. Follows all commands but when asked to state name, unable to open mouth to speak . Exhibits increased effort to stick out tongue on command. PERRL with no gaze deficit or hemianopsia. CTA negative for any vascular abnormalities.   High suspicion for psychogenic etiology of symptoms. Due to tPa admin, will admit to ICU for 24 hour monitoring.         Hospital Course: 10/26: Patient admitted to [facility] for higher level of care s/p tPA      Interval History:  No acute events. Patient mostly back at baseline; aphasia resolved. Endorses mild right hand tingling. Passed SLP evaluation for regular diet and thin liquids. MRI brain in process.     Review of Systems  Constitutional: Denies fevers or chills.  Pulmonary: Denies shortness of breath or cough.  Cardiology: Denies chest pain or palpitations.  GI: Denies abdominal pain or constipation.  Neurologic: Denies new weakness,  headache, or paresthesias.    Objective:     Vitals:  Temp: 98.4 °F (36.9 °C) (10/27/17 0705)  Pulse: (!) 59 (10/27/17 1005)  Resp: 20 (10/27/17 1005)  BP: 129/79 (10/27/17 1005)  SpO2: 100 % (10/27/17 1005)    Temp:  [98.1 °F (36.7 °C)-98.6 °F (37 °C)] 98.4 °F (36.9 °C)  Pulse:  [51-98] 59  Resp:  [13-25] 20  SpO2:  [98 %-100 %] 100 %  BP: (114-177)/() 129/79          No intake/output data recorded.    Physical  Exam  Constitutional: Well-nourished and -developed. Calm, NAD.   Eyes: Conjunctiva clear, anicteric.  Head/Ears/Nose/Mouth/Throat/Neck: Moist mucous membranes. External ears, nose atraumatic.   Cardiovascular: Regular rhythm. No murmurs. No leg edema.  Respiratory: Comfortable respirations. Clear to auscultation.  Gastrointestinal: No hernia. Soft, nondistended, nontender. + bowel sounds.     Neurologic:   Mental status: Awake, alert, responds to questions appropriately, fluent speech, no dysarthria or aphasia  CN: PERRL, EOMI, facial expressions symmetric, hearing grossly intact  Motor: 5/5 throughout except for 4/5 RUE  Sensory: intact to fine touch throughout except for mild decrease over right hand  Coordination: normal finger-to-nose    Medications:  Continuous Scheduled  sodium chloride 0.9% 3 mL Q8H   PRN  labetalol 10 mg PRN     Labs:  Component      Latest Ref Rng & Units 10/27/2017   WBC      3.90 - 12.70 K/uL 5.70   RBC      4.00 - 5.40 M/uL 4.11   Hemoglobin      12.0 - 16.0 g/dL 11.2 (L)   Hematocrit      37.0 - 48.5 % 35.1 (L)   MCV      82 - 98 fL 85   MCH      27.0 - 31.0 pg 27.3   MCHC      32.0 - 36.0 g/dL 31.9 (L)   RDW      11.5 - 14.5 % 17.2 (H)   Platelets      150 - 350 K/uL 242   MPV      9.2 - 12.9 fL 10.2   Immature Granulocytes      0.0 - 0.5 % 0.2   Gran #      1.8 - 7.7 K/uL 2.9   Immature Grans (Abs)      0.00 - 0.04 K/uL 0.01   Lymph #      1.0 - 4.8 K/uL 2.2   Mono #      0.3 - 1.0 K/uL 0.5   Eos #      0.0 - 0.5 K/uL 0.1   Baso #      0.00 - 0.20 K/uL 0.02   nRBC      0 /100 WBC 0   Gran%      38.0 - 73.0 % 50.3   Lymph%      18.0 - 48.0 % 37.7   Mono%      4.0 - 15.0 % 9.3   Eosinophil%      0.0 - 8.0 % 2.1   Basophil%      0.0 - 1.9 % 0.4   Differential Method       Automated   Sodium      136 - 145 mmol/L 141   Potassium      3.5 - 5.1 mmol/L 3.5   Chloride      95 - 110 mmol/L 110   CO2      23 - 29 mmol/L 22 (L)   Glucose      70 - 110 mg/dL 86   BUN, Bld      6 - 20 mg/dL 10    Creatinine      0.5 - 1.4 mg/dL 0.9   Calcium      8.7 - 10.5 mg/dL 9.1   Total Protein      6.0 - 8.4 g/dL 6.5   Albumin      3.5 - 5.2 g/dL 3.3 (L)   Total Bilirubin      0.1 - 1.0 mg/dL 0.7   Alkaline Phosphatase      55 - 135 U/L 62   AST      10 - 40 U/L 16   ALT      10 - 44 U/L 10   Anion Gap      8 - 16 mmol/L 9   eGFR if African American      >60 mL/min/1.73 m:2 >60.0   eGFR if non African American      >60 mL/min/1.73 m:2 >60.0   Magnesium      1.6 - 2.6 mg/dL 1.8   Phosphorus      2.7 - 4.5 mg/dL 4.0   aPTT      21.0 - 32.0 sec <21.0   POCT Glucose      70 - 110 mg/dL 101     Assessment/Plan:     Cardiac/Vascular   HTN (hypertension)    Holding home anti-hypertensives at this time.   - sBP goal now < 220 as patient is now > 24 hours s/p TPA        Hematology   * Tissue plasminogen activator (t-PA) administered at other facility within 24 hours prior to current admission    Patient admited to [facility] for right-sided weakness and aphasia s/p tPA. Patient's exam waxing and waning with high suspicion for psychogenic etiology. CTA negative. TPA completed 1000 on 10/26  --24 hour MRI brain in process   --sBP goal < 220 as patient is now > 24 hours s/p TPA  -- will transfer to vascular neurology today if no hemorrhagic conversion on MRI  --vascular neurology following  -- regular diet  -- start Lipitor 40mg per vascular neurology  -- will plan to start aspirin 81mg daily if no hemorrhagic conversion on MRI              Prophylaxis:  Venous Thromboembolism: mechanical  Stress Ulcer: None  Ventilator Pneumonia: not applicable     Activity Orders          Activity as tolerated starting at 10/26 2038        Full Code    Monse Vasques MD  Neurocritical Care  [facility]

## 2017-10-27 NOTE — NURSING TRANSFER
Nursing Transfer Note      10/27/2017     Transfer To: 703A    Transfer via wheelchair    Transfer with cardiac monitoring    Transported by Salvatore BAXTER    Medicines sent: none    Chart send with patient: Yes    Notified: spouse    Patient reassessed at: 10/27, 1615 (date, time)    Upon arrival to floor: cardiac monitor applied, patient oriented to room, call bell in reach and bed in lowest position

## 2017-10-27 NOTE — ASSESSMENT & PLAN NOTE
Patient admited to [facility] for right-sided weakness and aphasia s/p tPA. Patient's exam waxing and waning with high suspicion for psychogenic etiology. CTA negative. TPA completed 1000 on 10/26  --24 hour MRI brain in process   --sBP goal < 220 as patient is now > 24 hours s/p TPA  -- will transfer to vascular neurology today if no hemorrhagic conversion on MRI  --vascular neurology following  -- regular diet  -- start Lipitor 40mg per vascular neurology  -- will plan to start aspirin 81mg daily if no hemorrhagic conversion on MRI

## 2017-10-27 NOTE — HPI
This is a 52 y.o. female with history of HTN, and bariatric surgery who presented with right-sided weakness and aphasia at OSH where she  received tPA- end time 10 am. Symptoms reportedly sudden onset of R hemiparesis with aphasia. On exam in ED she was awake, tearful, resisting movement to RUE, and poor effort to movement on left. Follows all commands but when asked to state name, unable to open mouth to speak . Exhibits increased effort to stick out tongue on command. PERRL with no gaze deficit or hemianopsia. CTA negative for any vascular abnormalities.   High suspicion for psychogenic etiology of symptoms. Due to tPa admin, will admit to ICU for 24 hour monitoring.

## 2017-10-27 NOTE — SUBJECTIVE & OBJECTIVE
Neurologic Chief Complaint: RSW, numbness and aphasia     Subjective:     Interval History: Patient is seen for follow-up neurological assessment and treatment recommendations: Day 1 s/p tPA. NAEON. No issue per nursing. Speech and numbness improved. Exam improving. MRI brain pending. SBP well controlled and off cardene. Sating well at RA.Passsed PARTH and pending speech evaluation. AF and WBC WNL. H/H stable. All labs WNL. Pending PT/OT evaluation.      HPI, Past Medical, Family, and Social History remains the same as documented in the initial encounter.     Review of Systems   She report some numbness in her R hand otherwise negative   Scheduled Meds:   norepinephrine bitartrate-D5W        sodium chloride 0.9%  3 mL Intravenous Q8H     Continuous Infusions:   nicardipine Stopped (10/26/17 1258)     PRN Meds:labetalol    Objective:     Vital Signs (Most Recent):  Temp: 98.4 °F (36.9 °C) (10/27/17 0705)  Pulse: 61 (10/27/17 0905)  Resp: 15 (10/27/17 0905)  BP: 135/68 (10/27/17 0905)  SpO2: 98 % (10/27/17 0905)  BP Location: Left arm    Vital Signs Range (Last 24H):  Temp:  [98.1 °F (36.7 °C)-98.7 °F (37.1 °C)]   Pulse:  [51-98]   Resp:  [13-25]   BP: (114-177)/()   SpO2:  [95 %-100 %]   BP Location: Left arm    Physical Exam  General: appears well-developed and well-nourished.   Eyes: EOMI, PERRLA, normal visual acuity and grossly normal VF  Cardiovascular: normal rate, regular rhythm and normal pulses  Chest: no distress, BEAE  Abdominal: soft, ND, NT, bowel sounds are normal.   Skin: no rash   Psych/Behavior: normal mood and affect.   Musculoskeletal: appropriate tone    Neurological: AAOX3, GCS [id number], following commands, normal speech with mild dysarthria, mild R side pronation, CN II-XII grossly intact and face symm, full strength on the L side, R side extensors 4+/5, SILT except mild decrease to LT on R side, DTR symmetrical +2, finger to nose normal      NIH Stroke Scale:    Level of Consciousness: 0  - alert  LOC Questions: 0 - answers both correctly  LOC Commands: 0 - performs both correctly  Best Gaze: 0 - normal  Visual: 0 - no visual loss  Facial Palsy: 0 - normal  Motor Left Arm: 0 - no drift  Motor Right Arm: 1 - drift  Motor Left Le - no drift  Motor Right Le - no drift  Limb Ataxia: 0 - absent  Sensory: 1 - mild to moderate loss  Best Language: 0 - no aphasia  Dysarthria: 1 - mild to moderate dysarthria  Extinction and Inattention: 0 - no neglect  NIH Stroke Scale Total: 3           Laboratory:  CMP:   Recent Labs  Lab 10/27/17  0300   CALCIUM 9.1   ALBUMIN 3.3*   PROT 6.5      K 3.5   CO2 22*      BUN 10   CREATININE 0.9   ALKPHOS 62   ALT 10   AST 16   BILITOT 0.7     CBC:   Recent Labs  Lab 10/27/17  0300   WBC 5.70   RBC 4.11   HGB 11.2*   HCT 35.1*      MCV 85   MCH 27.3   MCHC 31.9*     Lipid Panel:   Recent Labs  Lab 10/26/17  1209   CHOL 196   LDLCALC 133.6   HDL 52   TRIG 52     Coagulation:   Recent Labs  Lab 10/26/17  1209  10/27/17  0300   INR 1.1  --   --    APTT  --   < > <21.0   < > = values in this interval not displayed.  Platelet Aggregation Study: No results for input(s): PLTAGG, PLTAGINTERP, PLTAGREGLACO, ADPPLTAGGREG in the last 168 hours.  Hgb A1C:   Recent Labs  Lab 10/26/17  2139   HGBA1C 4.8     TSH:   Recent Labs  Lab 10/26/17  1209   TSH 0.947       Diagnostic Results:  I have personally reviewed    CTA 10/26:  No acute hemorrhage or major vascular infarct. CT angiogram of the head and neck demonstrates minimal atherosclerotic disease. No high-grade stenosis or major branch occlusion identified    Echo/EKG : pending     MRI brain: pending

## 2017-10-27 NOTE — PLAN OF CARE
10/27/17 1118   Discharge Assessment   Assessment Type Discharge Planning Assessment   Confirmed/corrected address and phone number on facesheet? Yes   Assessment information obtained from? Patient;Caregiver   Expected Length of Stay (days) 3   Communicated expected length of stay with patient/caregiver yes   Prior to hospitilization cognitive status: Alert/Oriented   Prior to hospitalization functional status: Independent   Current cognitive status: Alert/Oriented   Current Functional Status: Needs Assistance   Facility Arrived From: [facility]   Lives With spouse;child(min), adult   Able to Return to Prior Arrangements unable to determine at this time (comments)   Is patient able to care for self after discharge? Unable to determine at this time (comments)   Who are your caregiver(s) and their phone number(s)? Neil Scott (spouse)  [phone]   Patient's perception of discharge disposition home or selfcare;rehab facility   Readmission Within The Last 30 Days no previous admission in last 30 days   Patient currently being followed by outpatient case management? No   Patient currently receives any other outside agency services? No   Equipment Currently Used at Home none   Do you have any problems affording any of your prescribed medications? No   Is the patient taking medications as prescribed? yes   Does the patient have transportation home? Yes   Transportation Available family or friend will provide   Does the patient receive services at the [facility]? No   Discharge Plan A Home with family  (Awaiting therapy notes.  Per MD exam:  full strength on the L side, R side 4 to 4+/5,)   Discharge Plan B Rehab   Patient/Family In Agreement With Plan yes         Discharge/ My Health Packet Folder Given to patient/family:      Yes        PCP:    NONE    Pharmacy:    [id number]  [place] - [address] AT [facility] [address] [facility]  [address]  [place] [address]  Phone: [phone] Fax:  [phone]        Emergency Contacts:  Extended Emergency Contact Information  Primary Emergency Contact: Amaris Mays   [facility]  Home Phone: [phone]  Relation: Mother-Daughter  Neil Scott ()  [phone]    Insurance:  Payor: MEDICAID / Plan: [facility] / Product Type: Managed Medicaid /       Giselle Alberts RN, CCRN-K, [facility]  Neuro-Critical Care   [phone]

## 2017-10-27 NOTE — ASSESSMENT & PLAN NOTE
--admit  To [facility], tpa protocol  --negative CTA  --24 hour CT/MRI  --sbp less than 180 during tpa time  --vascular neurology following

## 2017-10-27 NOTE — H&P
[facility]  Neurocritical Care  History & Physical    Admit Date: 10/26/2017  Service Date: 10/26/2017  Length of Stay: 0    Subjective:     Chief Complaint: Tissue plasminogen activator (t-PA) administered at other facility within 24 hours prior to current admission    History of Present Illness: This is a 52 y.o. female with history of HTN, and bariatric surgery who presented with right-sided weakness and aphasia at OSH where she  received tPA- end time 10 am. Symptoms reportedly sudden onset of R hemiparesis with aphasia. On exam in ED she was awake, tearful, resisting movement to RUE, and poor effort to movement on left. Follows all commands but when asked to state name, unable to open mouth to speak . Exhibits increased effort to stick out tongue on command. PERRL with no gaze deficit or hemianopsia. CTA negative for any vascular abnormalities.   High suspicion for psychogenic etiology of symptoms. Due to tPa admin, will admit to ICU for 24 hour monitoring.         Vitals:   Temp: 98.6 °F (37 °C) (10/26/17 1902)  Pulse: 61 (10/26/17 2000)  Resp: 13 (10/26/17 2000)  BP: (!) 150/92 (10/26/17 2000)  SpO2: 100 % (10/26/17 2000)    Temp:  [98.2 °F (36.8 °C)-98.7 °F (37.1 °C)] 98.6 °F (37 °C)  Pulse:  [61-98] 61  Resp:  [13-25] 13  SpO2:  [95 %-100 %] 100 %  BP: (119-177)/() 150/92              No intake/output data recorded.     Examination:   Constitutional: Well-nourished and -developed. crying  Eyes: Conjunctiva clear, anicteric. Lids no lesions.  Head/Ears/Nose/Mouth/Throat/Neck: Moist mucous membranes. External ears, nose atraumatic.   Cardiovascular: Regular rhythm. No murmurs. No leg edema.  Respiratory: Comfortable respirations. Clear to auscultation.  Gastrointestinal: No hernia. Soft, nondistended, nontender. + bowel sounds.    Neurologic:   Awake alert, attentive to conversation, not speaking  Follows commands without delay, but does not open mouth when asked to verbally  respond  Resists tongue protrusion  Displays waxing flexibility on motor/strength exam      Today I independently reviewed pertinent medications, imaging, lab results, notably: CTA stroke multiphase  Assessment/Plan:     Hematology   * Tissue plasminogen activator (t-PA) administered at other facility within 24 hours prior to current admission    --admit  To [facility], tpa protocol  --negative CTA  --24 hour CT/MRI  --sbp less than 180 during tpa time  --vascular neurology following                  Activity Orders          None        No Order    Reny Obregon PA-C  Neurocritical Care  [facility]

## 2017-10-27 NOTE — ASSESSMENT & PLAN NOTE
A 52 year old female with PMHx of HTN and smoking who p/w RSW, numbness and aphasia s/p tPA. CTA negative. She was admitted to [facility] for close monitoring after tPA. Speech and LSW/numbness improving.     -- Continue Neurocheck Q1 while in [facility]   -- MRI brain pending   -- Keep SBP <160; BP well controlled and she is off Cardene   -- Antithrombotics for secondary stroke prevention: Antiplatelets: Aspirin: 81 mg oral if MRI negative for hemorrhagic transformation   -- Statins for secondary stroke prevention and hyperlipidemia: Atorvastatin- 40 mg oral daily  -- Aggressive risk factor modification: Hypertension, HLD and Smoking  -- Rehab Efforts: Occupational Therapy, Speech and Language Pathology and Physical Medicine and Rehabilitation: Pending evaluation   -- Speech: she passed PARTH, pending formal speech evaluation   -- Diagnostics: , A1C 4.8, TSH 0.94. Echo pending   --  VTE Prophylaxis: Mechanical prophylaxis for now, SQH pending MRI  -- Okay for stepdown to floor if MRI okay

## 2017-10-27 NOTE — ASSESSMENT & PLAN NOTE
Holding home anti-hypertensives at this time.   - sBP goal now < 220 as patient is now > 24 hours s/p TPA

## 2017-10-27 NOTE — PLAN OF CARE
Problem: SLP Goal  Goal: SLP Goal  Outcome: Ongoing (interventions implemented as appropriate)  Regular diet with thin liquids recommended.    Louise Hurtado MA/ALMA ROSA-SLP  Speech Language Pathologist  Pager [phone]  10/27/2017

## 2017-10-27 NOTE — PT/OT/SLP EVAL
"Speech Language Pathology  Evaluation    Chloé Willams   MRN: [id number]   Admitting Diagnosis: Tissue plasminogen activator (t-PA) administered at other facility within 24 hours prior to current admission    Diet recommendations: Solid Diet Level: Regular  Liquid Diet Level: Thin Feed only when awake/alert, No straws and HOB to 90 degrees    SLP Treatment Date: 10/27/17  Speech Start Time: 0835     Speech Stop Time: 0845     Speech Total (min): 10 min       TREATMENT BILLABLE MINUTES:  Eval Swallow and Oral Function 10    Diagnosis: Tissue plasminogen activator (t-PA) administered at other facility within 24 hours prior to current admission      Past Medical History:   Diagnosis Date    Hypertension      History reviewed. No pertinent surgical history.    Has the patient been evaluated by SLP for swallowing? : Yes  Keep patient NPO?: No   General Precautions: Standard, aspiration, fall          Social Hx: Patient lives with spouse  Prior diet: regular      Subjective:  "I am hungry"  Pain/Comfort  Pain Rating 1: 0/10  Pain Rating Post-Intervention 1: 0/10    Objective:        Oral Musculature Evaluation  Oral Musculature: WFL  Dentition: present and adequate  Mucosal Quality: good  Mandibular Strength and Mobility: WFL  Oral Labial Strength and Mobility: WFL  Lingual Strength and Mobility: WFL  Velar Elevation: WFL  Buccal Strength and Mobility: WFL  Volitional Cough: strong  Volitional Swallow: no delay  Voice Prior to PO Intake: wfl     Bedside Swallow Eval:  Consistencies Assessed: Thin liquids 3 teaspoons followed by one cup of water self feed, Puree 2 teaspoons and Solids 1 cracker  Oral Phase: WFL  Pharyngeal Phase: no overt clinical  signs/symptoms of aspiration and no overt clinical signs/symptoms of pharyngeal dysphagia    Additional Treatment:      FIM:                                 Assessment:  Chloé Willams is a 52 y.o. female with a medical diagnosis of Tissue plasminogen activator (t-PA) " administered at other facility within 24 hours prior to current admission and presents with no s/s of aspiration.    Do you have any cultural, spiritual, Evangelical conflicts, given your current situation?: no     Discharge recommendations: Discharge Facility/Level Of Care Needs:  (dep on pt. and ot)     Goals:    SLP Goals        Problem: SLP Goal    Goal Priority Disciplines Outcome   SLP Goal     SLP Ongoing (interventions implemented as appropriate)   Description:  Speech Language Pathology Goals  Goals expected to be met by 11/3  1.  Participate in speech language evaluation                         Plan:   Patient to be seen Therapy Frequency: 5 x/week   Plan of Care expires: 11/25/17  Plan of Care reviewed with: patient, spouse  SLP Follow-up?: Yes  SLP - Next Visit Date: 10/30/17           Louise Hurtado MA, CCC-SLP  10/27/2017

## 2017-10-27 NOTE — SUBJECTIVE & OBJECTIVE
Interval History:  No acute events. Patient back at baseline and able to converse. Passed SLP evaluation for regular diet and thin liquids. MRI brain in process.     Review of Systems  Constitutional: Denies fevers or chills.  Pulmonary: Denies shortness of breath or cough.  Cardiology: Denies chest pain or palpitations.  GI: Denies abdominal pain or constipation.  Neurologic: Denies new weakness,  headache, or paresthesias.    Objective:     Vitals:  Temp: 98.4 °F (36.9 °C) (10/27/17 0705)  Pulse: (!) 59 (10/27/17 1005)  Resp: 20 (10/27/17 1005)  BP: 129/79 (10/27/17 1005)  SpO2: 100 % (10/27/17 1005)    Temp:  [98.1 °F (36.7 °C)-98.6 °F (37 °C)] 98.4 °F (36.9 °C)  Pulse:  [51-98] 59  Resp:  [13-25] 20  SpO2:  [98 %-100 %] 100 %  BP: (114-177)/() 129/79          No intake/output data recorded.    Physical Exam  Constitutional: Well-nourished and -developed. Calm, NAD.   Eyes: Conjunctiva clear, anicteric.  Head/Ears/Nose/Mouth/Throat/Neck: Moist mucous membranes. External ears, nose atraumatic.   Cardiovascular: Regular rhythm. No murmurs. No leg edema.  Respiratory: Comfortable respirations. Clear to auscultation.  Gastrointestinal: No hernia. Soft, nondistended, nontender. + bowel sounds.     Neurologic:   Mental status: Awake, alert, responds to questions appropriately, fluent speech, no dysarthria or aphasia  CN: PERRL, EOMI, facial expressions symmetric, hearing grossly intact  Motor: 5/5 throughout  Sensory: intact to fine touch throughout  Coordination: normal finger-to-nose    Medications:  Continuous Scheduled  sodium chloride 0.9% 3 mL Q8H   PRN  labetalol 10 mg PRN     Labs:  Component      Latest Ref Rng & Units 10/27/2017   WBC      3.90 - 12.70 K/uL 5.70   RBC      4.00 - 5.40 M/uL 4.11   Hemoglobin      12.0 - 16.0 g/dL 11.2 (L)   Hematocrit      37.0 - 48.5 % 35.1 (L)   MCV      82 - 98 fL 85   MCH      27.0 - 31.0 pg 27.3   MCHC      32.0 - 36.0 g/dL 31.9 (L)   RDW      11.5 - 14.5 % 17.2 (H)    Platelets      150 - 350 K/uL 242   MPV      9.2 - 12.9 fL 10.2   Immature Granulocytes      0.0 - 0.5 % 0.2   Gran #      1.8 - 7.7 K/uL 2.9   Immature Grans (Abs)      0.00 - 0.04 K/uL 0.01   Lymph #      1.0 - 4.8 K/uL 2.2   Mono #      0.3 - 1.0 K/uL 0.5   Eos #      0.0 - 0.5 K/uL 0.1   Baso #      0.00 - 0.20 K/uL 0.02   nRBC      0 /100 WBC 0   Gran%      38.0 - 73.0 % 50.3   Lymph%      18.0 - 48.0 % 37.7   Mono%      4.0 - 15.0 % 9.3   Eosinophil%      0.0 - 8.0 % 2.1   Basophil%      0.0 - 1.9 % 0.4   Differential Method       Automated   Sodium      136 - 145 mmol/L 141   Potassium      3.5 - 5.1 mmol/L 3.5   Chloride      95 - 110 mmol/L 110   CO2      23 - 29 mmol/L 22 (L)   Glucose      70 - 110 mg/dL 86   BUN, Bld      6 - 20 mg/dL 10   Creatinine      0.5 - 1.4 mg/dL 0.9   Calcium      8.7 - 10.5 mg/dL 9.1   Total Protein      6.0 - 8.4 g/dL 6.5   Albumin      3.5 - 5.2 g/dL 3.3 (L)   Total Bilirubin      0.1 - 1.0 mg/dL 0.7   Alkaline Phosphatase      55 - 135 U/L 62   AST      10 - 40 U/L 16   ALT      10 - 44 U/L 10   Anion Gap      8 - 16 mmol/L 9   eGFR if African American      >60 mL/min/1.73 m:2 >60.0   eGFR if non African American      >60 mL/min/1.73 m:2 >60.0   Magnesium      1.6 - 2.6 mg/dL 1.8   Phosphorus      2.7 - 4.5 mg/dL 4.0   aPTT      21.0 - 32.0 sec <21.0   POCT Glucose      70 - 110 mg/dL 101

## 2017-10-27 NOTE — PLAN OF CARE
Problem: Patient Care Overview  Goal: Plan of Care Review  Outcome: Ongoing (interventions implemented as appropriate)  POC reviewed with pt and family at 0300. Pt and family verbalized understanding. Questions and concerns addressed. No acute events today. VS stable. Expressive aphasia is no longer a problem. Pt progressing toward goals. Will continue to monitor. See flowsheets for full assessment and VS info.

## 2017-10-27 NOTE — CONSULTS
[facility]  Vascular Neurology  Comprehensive Stroke Center  Tele-Consultation Note     Consult Start Time: 10/26/2017 09:24  Consult End Time: 10/26/2017 09:45     ALTEPLASE RECCOMENDATION TIME: 09:00  Please, note that patient was already receiving iv alteplase when I was contacted for this consultation, and the recommendation for iv alteplase was make by the ED physician at [facility], thus the alteplase recommendation time I am providing may not be entirely accurate.   I was informed by the referral center that there was technical difficulties connecting to the ED site.        Consults     Consulting Provider: Spoke Physician:: Milagros Curtis     Patient Location: [facility] Emergency Department  Spoke hospital nurse at bedside with patient assisting consultant.            Patient information was obtained from spouse/SO, EMS personnel and ED staff.         Subjective:      History of Present Illness: 53 y/o with HTN, no documented history of psych disorder although patient was taking Seroquel and Celexa until last June, was reportedly in her usual state of health and went to work when around 7:30 am developed right hemiparesis and inability to speak. Never had similar symptoms before.  Noted to have /100 upon arrival to the ED and started on nicardipine drip.  At this time, she is intermittently crying, follows simple commands but is non communicative and is not moving the right side.        No notes on file     Teleconsult Time Documentation     Woke up with symptoms?: no  Last known normal: Last known well (date and time):: 0730  Is the most recent last known normal correct? Yes        Does the patient take any Blood Thinners? no  Recent bleeding noted: no  Medications: Antiplatelets:  aspirin. Of note, patient stopped Seroquel and Celexa last June        Past Medical History: hypertension and smoking     Past Surgical History: no major surgeries within the last  2 weeks     Family History: no relevant family history     Social History: smoker (active)     Allergies:   No known drug allergies     Review of Systems   Reason unable to perform ROS: unable to obtain due to patient being non verbal.      Objective:   Vitals: There were no vitals taken for this visit. BP: 131/82, Respiratory Rate: 17 and Heart Rate: 76     CT READ: No. CT was not in the PACS system, reportedly normal CT as per radiologist at that facility     Physical Exam   Constitutional: She appears well-developed and well-nourished.   HENT:   Head: Normocephalic and atraumatic.   Eyes: EOM are normal. Pupils are equal, round, and reactive to light.   Neck: Normal range of motion. Neck supple.   Cardiovascular: Normal rate.    Pulmonary/Chest: Effort normal. No respiratory distress.   Abdominal: Soft. She exhibits no mass.   Genitourinary:   Genitourinary Comments: No performed   Musculoskeletal: Normal range of motion. She exhibits no deformity.   Neurological: She is alert. A sensory deficit is present. No cranial nerve deficit. She exhibits abnormal muscle tone.   Non verbal   Psychiatric:   Emotional, cries intermittently          NIH Stroke Scale:  Interval: baseline (upon arrival/admit)  Level of Consciousness: 0 - alert  LOC Questions: 2 - answers none correctly  LOC Commands: 2 - performs neither correctly  Best Gaze: 0 - normal  Visual: 0 - no visual loss  Facial Palsy: 0 - normal  Motor Left Arm: 0 - no drift  Motor Right Arm: 3 - no effort against gravity  Motor Left Le - no drift  Motor Right Leg: 3 - no effort against gravity  Limb Ataxia: 0 - absent  Sensory: 2 - severe to total loss  Best Language: 3 - mute  Dysarthria: 0 - normal articulation  Extinction and Inattention: 0 - no neglect  NIH Stroke Scale Total: 15  Modified Serena Scale:   Timeline: Prior to symptoms onset  Modified Andrews Score: 0 - no symptoms                 Assessment/Plan:   51 y/o with acute onset right hemiparesis and  expressive aphasia.   Already receiving iv alteplase.  Diagnoses: right MCA syndrome.   However, has some inconsistencies on exam, cries intermittently, and can not entirely exclude a somatoform presentation with hemiparesis/apahsia (unclear why she was on Seroquel and Celexa until last June).     No new Assessment & Plan notes have been filed under this hospital service since the last note was generated.  Service: Vascular Neurology        There were no vitals taken for this visit.  Alteplase Eligible?: Yes  Alteplase Recommendation:   Altaplase Recommendation   Altaplase Total Dose:           Bolus Dose       Intravenously over 1 minute (10% of Total Dose)   Infusion Dose       Continuous Infusion over 60 Minutes      Additional Recommendations:   1. Neurological assessment and vital signs (except temperature) every 15 minutes during Altaplase infusion.  2. Frequency of BP assessments may need to be increased if systolic BP stays >= 180 mm Hg or diastolic BP stays >= 105 mm Hg. Administer antihypertensive meds as ordered  3. Continue to monitor and control blood pressure and monitor for neurological deterioration every 15 minutes for the first hour after the infusion is stopped. Then every 30 minutes for the next 6 hours. Perform hourly monitoring from the 8th post-infusion hour until 24 hours post-infusion.  4. Temperature every 4 hours or as required.  5. Follow hospital protocol for further orders re: post tPA infusion patient management.  6. No antithrombotics or anticoagulants (including but not limited to: heparin, warfarin, aspirin, clopidigrel, or dipyridamole) for 24 hours, then start antithrombotics as ordered by treating physician    Adapted from the American Heart Association/American Stroke Association (AHA/ASA) and American Association of Neuroscience Nurses (AANN) Guidelines.      Possible Interventional Revascularization Candidate? Yes     Recommendation: NPO until after pass dysphagia screen.  Diagnostic studies: CTA Head to assess vasculature , CTA Neck/Arch to assess vasculature, HgbA1C to assess blood glucose levels, Lipid Profile to assess cholesterol levels, MRI head without contrast to assess brain parenchyma, Trans-thoracic cardiac echo to assess cardiac function/status, TSH to assess thyroid function  Consults: NCC, Rehab Consult; Physical Therapy and Stroke Team  Antithrombotics: Hold all Antithrombotics x 24 hrs after IV t-PA given  Statins: Atorvastatin- 40 mg oral daily     Disposition Recommendation: transfer to [facility] by  air  stat     Recommended the emergency room physician to have a brief discussion with the patient and/or family if available regarding the risks and benefits of treatment, and to briefly document the occurrence of that discussion in his clinical encounter note.      The attending portion of this evaluation, treatment, and documentation was performed per Clem Still MD via audiovisual.     Billing code:  (moderate to severe stroke, large areas of edema, some mimics)    · This patient has a critical neurological condition/illness, with high morbidity and mortality.  · There is a high probability for acute neurological change leading to clinical and possibly life-threatening deterioration requiring highest level of physician preparedness for urgent intervention.  · Care was coordinated with other physicians involved in the patient's care.  · Radiologic studies and laboratory data were reviewed and interpreted, and plan of care was re-assessed based on the results.  · Diagnosis, treatment options and prognosis may have been discussed with the patient and/or family members or caregiver.  · Further advanced medical management and further evaluation is warranted for his care.           Clem Still MD  Comprehensive Stroke Center  Vascular Neurology   [facility]      Routing History

## 2017-10-27 NOTE — ASSESSMENT & PLAN NOTE
A 52 year old female with PMHx of HTN and smoking who p/w RSW, numbness and aphasia s/p tPA. CTA negative. She was admitted to [facility] for close monitoring after tPA. Speech and LSW/numbness improving.     -- Continue Neurocheck Q1 while in [facility]   -- MRI brain pending   -- Keep SBP <160; BP well controlled and she is off Cardene   -- Antithrombotics for secondary stroke prevention: Antiplatelets: Aspirin: 81 mg oral if MRI negative for hemorrhagic transformation   -- Statins for secondary stroke prevention and hyperlipidemia: Atorvastatin- 40 mg oral daily  -- Aggressive risk factor modification: Hypertension, HLD and Smoking  -- Rehab Efforts: Occupational Therapy, Speech and Language Pathology and Physical Medicine and Rehabilitation: Pending evaluation   -- Speech: she passed PARTH, pending formal speech evaluation   -- Diagnostics: , A1C 4.8, TSH 0.94. Echo pending   --  VTE Prophylaxis: Mechanical prophylaxis for now, SQH pending MRI

## 2017-10-28 VITALS
HEIGHT: 66 IN | TEMPERATURE: 98 F | OXYGEN SATURATION: 95 % | SYSTOLIC BLOOD PRESSURE: 136 MMHG | WEIGHT: 169.75 LBS | BODY MASS INDEX: 27.28 KG/M2 | HEART RATE: 64 BPM | DIASTOLIC BLOOD PRESSURE: 74 MMHG | RESPIRATION RATE: 18 BRPM

## 2017-10-28 LAB
ALBUMIN SERPL BCP-MCNC: 3.2 G/DL
ALP SERPL-CCNC: 57 U/L
ALT SERPL W/O P-5'-P-CCNC: 7 U/L
AMPHETAMINES SERPL QL: NEGATIVE
ANION GAP SERPL CALC-SCNC: 9 MMOL/L
APTT BLDCRRT: 27.5 SEC
AST SERPL-CCNC: 14 U/L
BARBITURATES SERPL QL SCN: NEGATIVE
BASOPHILS # BLD AUTO: 0.04 K/UL
BASOPHILS NFR BLD: 0.7 %
BENZODIAZ SERPL QL: NEGATIVE
BILIRUB SERPL-MCNC: 0.5 MG/DL
BUN SERPL-MCNC: 18 MG/DL
CALCIUM SERPL-MCNC: 8.5 MG/DL
CANNABINOIDS SERPL QL: NEGATIVE
CHLORIDE SERPL-SCNC: 109 MMOL/L
CO2 SERPL-SCNC: 22 MMOL/L
COCAINE, BLOOD: NEGATIVE
CREAT SERPL-MCNC: 0.8 MG/DL
DIFFERENTIAL METHOD: ABNORMAL
EOSINOPHIL # BLD AUTO: 0.2 K/UL
EOSINOPHIL NFR BLD: 3.8 %
ERYTHROCYTE [DISTWIDTH] IN BLOOD BY AUTOMATED COUNT: 17.2 %
EST. GFR  (AFRICAN AMERICAN): >60 ML/MIN/1.73 M^2
EST. GFR  (NON AFRICAN AMERICAN): >60 ML/MIN/1.73 M^2
ETHANOL SERPL-MCNC: NEGATIVE MG/DL
GLUCOSE SERPL-MCNC: 86 MG/DL
HCT VFR BLD AUTO: 36.3 %
HGB BLD-MCNC: 11.8 G/DL
IMM GRANULOCYTES # BLD AUTO: 0.01 K/UL
IMM GRANULOCYTES NFR BLD AUTO: 0.2 %
LYMPHOCYTES # BLD AUTO: 2.4 K/UL
LYMPHOCYTES NFR BLD: 42 %
MAGNESIUM SERPL-MCNC: 1.6 MG/DL
MCH RBC QN AUTO: 27.6 PG
MCHC RBC AUTO-ENTMCNC: 32.5 G/DL
MCV RBC AUTO: 85 FL
METHADONE SERPL QL SCN: NEGATIVE
MONOCYTES # BLD AUTO: 0.5 K/UL
MONOCYTES NFR BLD: 8.4 %
NEUTROPHILS # BLD AUTO: 2.6 K/UL
NEUTROPHILS NFR BLD: 44.9 %
NRBC BLD-RTO: 0 /100 WBC
OPIATES SERPL QL SCN: NEGATIVE
PCP SERPL QL SCN: NEGATIVE
PHOSPHATE SERPL-MCNC: 3.7 MG/DL
PLATELET # BLD AUTO: 251 K/UL
PMV BLD AUTO: 10.6 FL
POCT GLUCOSE: 151 MG/DL (ref 70–110)
POTASSIUM SERPL-SCNC: 3.7 MMOL/L
PROPOXYPH SERPL QL: NEGATIVE
PROT SERPL-MCNC: 6.3 G/DL
RBC # BLD AUTO: 4.27 M/UL
SODIUM SERPL-SCNC: 140 MMOL/L
WBC # BLD AUTO: 5.72 K/UL

## 2017-10-28 PROCEDURE — 80053 COMPREHEN METABOLIC PANEL: CPT

## 2017-10-28 PROCEDURE — 25000003 PHARM REV CODE 250: Performed by: STUDENT IN AN ORGANIZED HEALTH CARE EDUCATION/TRAINING PROGRAM

## 2017-10-28 PROCEDURE — 25000003 PHARM REV CODE 250: Performed by: PHYSICIAN ASSISTANT

## 2017-10-28 PROCEDURE — 97530 THERAPEUTIC ACTIVITIES: CPT

## 2017-10-28 PROCEDURE — 97161 PT EVAL LOW COMPLEX 20 MIN: CPT

## 2017-10-28 PROCEDURE — 99232 SBSQ HOSP IP/OBS MODERATE 35: CPT | Mod: ,,, | Performed by: PSYCHIATRY & NEUROLOGY

## 2017-10-28 PROCEDURE — 85730 THROMBOPLASTIN TIME PARTIAL: CPT

## 2017-10-28 PROCEDURE — 97165 OT EVAL LOW COMPLEX 30 MIN: CPT

## 2017-10-28 PROCEDURE — 97116 GAIT TRAINING THERAPY: CPT

## 2017-10-28 PROCEDURE — 83735 ASSAY OF MAGNESIUM: CPT

## 2017-10-28 PROCEDURE — A4216 STERILE WATER/SALINE, 10 ML: HCPCS | Performed by: PHYSICIAN ASSISTANT

## 2017-10-28 PROCEDURE — 84100 ASSAY OF PHOSPHORUS: CPT

## 2017-10-28 PROCEDURE — 63600175 PHARM REV CODE 636 W HCPCS: Performed by: STUDENT IN AN ORGANIZED HEALTH CARE EDUCATION/TRAINING PROGRAM

## 2017-10-28 PROCEDURE — 36415 COLL VENOUS BLD VENIPUNCTURE: CPT

## 2017-10-28 PROCEDURE — 85025 COMPLETE CBC W/AUTO DIFF WBC: CPT

## 2017-10-28 RX ORDER — NICOTINE 7MG/24HR
1 PATCH, TRANSDERMAL 24 HOURS TRANSDERMAL DAILY
Refills: 0 | COMMUNITY
Start: 2017-10-28 | End: 2017-11-27

## 2017-10-28 RX ORDER — AMLODIPINE BESYLATE 10 MG/1
10 TABLET ORAL DAILY
Qty: 30 TABLET | Refills: 5 | Status: SHIPPED | OUTPATIENT
Start: 2017-10-28 | End: 2022-12-13

## 2017-10-28 RX ORDER — ATORVASTATIN CALCIUM 40 MG/1
40 TABLET, FILM COATED ORAL DAILY
Qty: 90 TABLET | Refills: 3 | Status: SHIPPED | OUTPATIENT
Start: 2017-10-29 | End: 2022-07-12 | Stop reason: SDUPTHER

## 2017-10-28 RX ORDER — NAPROXEN SODIUM 220 MG/1
81 TABLET, FILM COATED ORAL DAILY
Refills: 0 | COMMUNITY
Start: 2017-10-29 | End: 2018-10-29

## 2017-10-28 RX ADMIN — HEPARIN SODIUM 5000 UNITS: 5000 INJECTION, SOLUTION INTRAVENOUS; SUBCUTANEOUS at 06:10

## 2017-10-28 RX ADMIN — Medication 3 ML: at 06:10

## 2017-10-28 RX ADMIN — ATORVASTATIN CALCIUM 40 MG: 20 TABLET, FILM COATED ORAL at 08:10

## 2017-10-28 RX ADMIN — ASPIRIN 81 MG CHEWABLE TABLET 81 MG: 81 TABLET CHEWABLE at 08:10

## 2017-10-28 NOTE — PLAN OF CARE
Problem: Patient Care Overview  Goal: Plan of Care Review  Outcome: Outcome(s) achieved Date Met: 10/28/17  Patient awake, alert and responsive, oriented x 4. Vital signs within parameters/ Medications administered as ordered. Discharge papers given to patient, reviewed medications, discontinued Iv's. Verbalized understanding.

## 2017-10-28 NOTE — PT/OT/SLP EVAL
"Physical Therapy  Evaluation    Chloé Willams   MRN: [id number]   Admitting Diagnosis: Tissue plasminogen activator (t-PA) administered at other facility within 24 hours prior to current admission    PT Received On: 10/28/17  PT Start Time: 0740     PT Stop Time: 0800    PT Total Time (min): 20 min       Billable Minutes:  Evaluation 10 and Gait [id number]    Diagnosis: Tissue plasminogen activator (t-PA) administered at other facility within 24 hours prior to current admission  L MCA stroke    Past Medical History:   Diagnosis Date    Hypertension       History reviewed. No pertinent surgical history.    Referring physician: Basia Wu NP  Date referred to PT: 10/27/17    General Precautions: Standard, aspiration, fall  Orthopedic Precautions: N/A   Braces: N/A       Patient History:  Pt states living in [place], [place] in a 1st floor apartment with no SHAD with her . Pt states being independent with all ADLs and functional mobility PTA with no DME.  Equipment used at home:  No Assistive Device.  Equipment owned but not using:  Axillary crutches.  Activity level: active.   Work: yes, sitter.   Drive: yes.   Cooking/Cleaning/Managing Home: yes.   Hand dominance: right.   Wears glasses:  Reading glasses  Hobbies: fishing.    Previous Level of Function:  Ambulation Skills: independent  Transfer Skills: independent  ADL Skills: independent  Work/Leisure Activity: independent    Subjective:  Communicated with RN prior to session.  "From not being able to speak, to being able to speak to you, I'd say I'm doing good."    Chief Complaint: RLE weakness  Patient goals: to return home    Pain/Comfort  Pain Rating 1: 0/10  Pain Rating Post-Intervention 1: 0/10    Objective:   Patient found with: telemetry     Cognitive Exam:  Oriented to: Person, Place, Time and Situation    Follows Commands/attention: Follows multistep  commands  Communication: clear/fluent  Safety awareness/insight to disability: " intact    Physical Exam:  Postural examination/scapula alignment: Rounded shoulder and Head forward    Skin integrity: Visible skin intact  Edema: None noted    Sensation:   Intact  light/touch BLE (RLE and RUE decreased compared to LUE and LLE) face intact on both sides    Lower Extremity Range of Motion:  Right Lower Extremity: WFL  Left Lower Extremity: WFL    Lower Extremity Strength:  Right Lower Extremity: grossly 4/5  Left Lower Extremity: WFL     Functional Mobility:  Pt found supine, with  present.  Bed Mobility:       Supine <> Sit: From right sidelying: [place]   Sit <> Supine: To right sidelying: [place]    Sitting Balance at Edge of Bed:   Assistance Level Required: [place]   Time: 5 minutes   Postural deviations noted: Rounded shoulder, Head forward and Posterior pelvic tilt     Transfers:    Sit <> Stand: x 1 trial from EOB with Stand-by Assistance with No Assistive Device    Stand <> Sit: x 1 trial to EOB with  Supervision with No Assistive Device   Comments: Pt requires increased time to perform mobility     Gait:   Distance Ambulated: 50ft    Assistance level: Stand-by Assistance   Assistive Device used:  No Assistive Device   Gait Pattern: 2-point gait   Gait Deviation(s): decreased jia, decreased step length, decreased stride length, decreased swing-to-stance ratio, decreased toe-to-floor clearance and decreased weight-shifting ability   Impairments due to: decreased strength of RLE   Comments: Pt with decreased R knee control, decreased heel contact at initial contact and increased left lateral sway at initial swing. Pt with increasing deviations with fatigue    Therapeutic Activities & Exercises:   Education:  Patient provided with daily orientation and goals of this PT session. Patient and family agreed to participate in session.Patient and family aware of patient's deficits and therapy progression. They were educated to transfer with RN/PCT only; SBA of 1 person.  Time provided for therapeutic counseling and discussion of health disposition. All questions answered to patient's and family's satisfaction, within scope of PT practice; voiced no other concerns. White board updated in patient's room, RN notified of session.    Patient left supine, with all lines intact, call button in reach, RN notified and  present    AM-PAC 6 CLICK MOBILITY  How much help from another person does this patient currently need?   1 = Unable, Total/Dependent Assistance  2 = A lot, Maximum/Moderate Assistance  3 = A little, Minimum/Contact Guard/Supervision  4 = None, Modified [place]/Independent    Turning over in bed (including adjusting bedclothes, sheets and blankets)?: 4  Sitting down on and standing up from a chair with arms (e.g., wheelchair, bedside commode, etc.): 4  Moving from lying on back to sitting on the side of the bed?: 4  Moving to and from a bed to a chair (including a wheelchair)?: 4  Need to walk in hospital room?: 3  Climbing 3-5 steps with a railing?: 3  Total Score: 22     AM-PAC Raw Score CMS G-Code Modifier Level of Impairment Assistance   6 % Total / Unable   7 - 9 CM 80 - 100% Maximal Assist   10 - 14 CL 60 - 80% Moderate Assist   15 - 19 CK 40 - 60% Moderate Assist   20 - 22 CJ 20 - 40% Minimal Assist   23 CI 1-20% SBA / CGA   24 CH 0% Independent/ Mod I     Assessment:   Chloé Willams is a 52 y.o. female with a medical diagnosis of Tissue plasminogen activator (t-PA) administered at other facility within 24 hours prior to current admission. Pt presents with decreased RLE strength and gait instability. Ms. Willams's deficits affect their ability to safely and independently participate in self-care tasks and functional mobility. Due to her physical therapy diagnosis of debility and deconditioning, they continue to benefit from acute PT services to address the following limitations: high fall risk, weakness, instability, the need for supervised  instructions of exercise, and the decreased ability to perform functional activities which they were able to complete PTA. Education was provided to patient & family regarding importance of continued participation in therapy, patient's progress and discharge disposition. Pt would benefit from OPPT upon discharge to improve quality of life and focus on recovery of impairments.     Rehab identified problem list/impairments: Rehab identified problem list/impairments: weakness, impaired balance, gait instability    Rehab potential is good.    Activity tolerance: Good    Discharge recommendations: Discharge Facility/Level Of Care Needs: outpatient PT     Barriers to discharge: Barriers to Discharge: None    Equipment recommendations: Equipment Needed After Discharge: bath bench     GOALS:    Physical Therapy Goals        Problem: Physical Therapy Goal    Goal Priority Disciplines Outcome Goal Variances Interventions   Physical Therapy Goal     PT/OT, PT Ongoing (interventions implemented as appropriate)     Description:  Goals to be met by: 11/3/17     Patient will increase functional independence with mobility by performing:    Sit to stand transfer with Modified [place] using No Assistive Device.  Bed to chair transfer via Stand Pivot with Modified [place] using No Assistive Device.  Gait  x 100 feet with Modified [place] using No Assistive Device, with increased R knee control, heel contact and appropriate weight shift with <25% cues.   Dynamic standing for 5 minutes with Modified [place] using No Assistive Device.  Able to tolerate exercise for 15-20 reps with independence.  Patient and family able to teachback stroke & positioning education independently.                    PLAN:    Patient to be seen 5 x/week to address the above listed problems via gait training, therapeutic activities, therapeutic exercises, neuromuscular re-education  Plan of Care expires: 11/27/17  Plan of Care reviewed  with: patient, spouse    Personal factors/comorbidities: HTN. Due to the listed comorbidities the patient cannot fully participate in PT POC.  Body systems elements affected: lower extremities, trunk, neuromuscular system,  Clinical Presentation: stable,   Functional Outcome Tools: AMPAC, ROM, MMT, orientation,    Evaluation Complexity Level: Low    Hnany Riggs PT, DPT  10/28/2017  Pager: [phone]

## 2017-10-28 NOTE — SUBJECTIVE & OBJECTIVE
Neurologic Chief Complaint: RSW, numbness and aphasia     Subjective:     Interval History: Patient is seen for follow-up neurological assessment and treatment recommendations: Day 1 s/p tPA. NAEON. No issue per nursing. Speech and numbness improved. Exam improving. MRI brain pending. SBP well controlled and off cardene. Sating well at [facility].Passsed PARTH and pending speech evaluation. AF and WBC WNL. H/H stable. All labs WNL. Pending PT/OT evaluation.      HPI, Past Medical, Family, and Social History remains the same as documented in the initial encounter.     Review of Systems   Constitutional: Negative for chills, diaphoresis and fever.   HENT: Negative for drooling, trouble swallowing and voice change.    Eyes: Negative for photophobia and visual disturbance.   Respiratory: Negative for cough, chest tightness, shortness of breath and wheezing.    Cardiovascular: Negative for chest pain and palpitations.   Gastrointestinal: Negative for abdominal pain, nausea and vomiting.   Endocrine: Negative for polyphagia and polyuria.   Genitourinary: Negative for dysuria, hematuria and urgency.   Musculoskeletal: Negative for arthralgias, joint swelling, myalgias and neck pain.   Skin: Negative for pallor and rash.   Neurological: Positive for facial asymmetry, speech difficulty, weakness and numbness. Negative for dizziness and headaches.   Psychiatric/Behavioral: Negative for agitation and confusion. The patient is not nervous/anxious.       She report some numbness in her R hand otherwise negative   Scheduled Meds:   aspirin  81 mg Oral Daily    atorvastatin  40 mg Oral Daily    heparin (porcine)  5,000 Units Subcutaneous Q8H    sodium chloride 0.9%  3 mL Intravenous Q8H     Continuous Infusions:     PRN Meds:labetalol    Objective:     Vital Signs (Most Recent):  Temp: 98.3 °F (36.8 °C) (10/28/17 0725)  Pulse: 64 (10/28/17 1100)  Resp: 18 (10/28/17 0725)  BP: 136/74 (10/28/17 0725)  SpO2: 95 % (10/28/17 0725)  BP  Location: Right arm    Vital Signs Range (Last 24H):  Temp:  [97.6 °F (36.4 °C)-98.9 °F (37.2 °C)]   Pulse:  [58-74]   Resp:  [15-27]   BP: (124-157)/(74-86)   SpO2:  [94 %-100 %]   BP Location: Right arm    Physical Exam   Constitutional: She is oriented to person, place, and time. She appears well-developed and well-nourished. She is cooperative. No distress.   HENT:   Head: Normocephalic and atraumatic.   Eyes: Conjunctivae and EOM are normal. Pupils are equal, round, and reactive to light.   Neck: Normal range of motion. Neck supple.   Cardiovascular: Normal rate and regular rhythm.  Exam reveals gallop and S3.    No murmur heard.  Pulmonary/Chest: Effort normal and breath sounds normal. No tachypnea. She has no wheezes. She has no rales. She exhibits no tenderness.   Abdominal: Soft. Bowel sounds are normal. She exhibits no distension. There is no tenderness.   Musculoskeletal: Normal range of motion. She exhibits no edema or tenderness.   Neurological: She is alert and oriented to person, place, and time.   Skin: No rash noted. She is not diaphoretic. No cyanosis or erythema.   Psychiatric: She has a normal mood and affect. Her behavior is normal.     General: appears well-developed and well-nourished.   Eyes: EOMI, PERRLA, normal visual acuity and grossly normal VF  Cardiovascular: normal rate, regular rhythm and normal pulses  Chest: no distress, BEAE  Abdominal: soft, ND, NT, bowel sounds are normal.   Skin: no rash   Psych/Behavior: normal mood and affect.   Musculoskeletal: appropriate tone    Neurological: AAOX3, GCS E4V5M6, following commands, normal speech with mild dysarthria, mild R side pronation, CN II-XII grossly intact and face symm, full strength on the L side, R side extensors 4+/5, SILT except mild decrease to LT on R side, DTR symmetrical +2, finger to nose normal      NIH Stroke Scale:  Interval: 7 days or at discharge (whichever comes first)  Level of Consciousness: 0 - alert  LOC  Questions: 0 - answers both correctly  LOC Commands: 0 - performs both correctly  Best Gaze: 0 - normal  Visual: 0 - no visual loss  Facial Palsy: 1 - minor  Motor Left Arm: 0 - no drift  Motor Right Arm: 0 - no drift  Motor Left Le - no drift  Motor Right Le - no drift  Limb Ataxia: 0 - absent  Sensory: 1 - mild to moderate loss  Best Language: 0 - no aphasia  Dysarthria: 1 - mild to moderate dysarthria  Extinction and Inattention: 0 - no neglect  NIH Stroke Scale Total: 3      Neurologic Chief Complaint: RSW, numbness and aphasia     Subjective:     HPI, Past Medical, Family, and Social History remains the same as documented in the initial encounter.     Review of Systems   She report some numbness in her R hand otherwise negative   Scheduled Meds:   aspirin  81 mg Oral Daily    atorvastatin  40 mg Oral Daily    heparin (porcine)  5,000 Units Subcutaneous Q8H    sodium chloride 0.9%  3 mL Intravenous Q8H     Continuous Infusions:     PRN Meds:labetalol    Objective:     Vital Signs (Most Recent):  Temp: 98.3 °F (36.8 °C) (10/28/17 0725)  Pulse: 64 (10/28/17 1100)  Resp: 18 (10/28/17 0725)  BP: 136/74 (10/28/17 0725)  SpO2: 95 % (10/28/17 0725)  BP Location: Right arm    Vital Signs Range (Last 24H):  Temp:  [97.6 °F (36.4 °C)-98.9 °F (37.2 °C)]   Pulse:  [58-74]   Resp:  [15-27]   BP: (124-157)/(74-86)   SpO2:  [94 %-100 %]   BP Location: Right arm    Physical Exam  General: appears well-developed and well-nourished.   Eyes: EOMI, PERRLA, normal visual acuity and grossly normal VF  Cardiovascular: normal rate, regular rhythm and normal pulses  Chest: no distress, BEAE  Abdominal: soft, ND, NT, bowel sounds are normal.   Skin: no rash   Psych/Behavior: normal mood and affect.   Musculoskeletal: appropriate tone    Neurological: AAOX3, GCS E4V5M6, following commands, normal speech with mild dysarthria, mild R side pronation, CN II-XII grossly intact and face symm, full strength on the L side, R side  extensors 4+/5, SILT except mild decrease to LT on R side, DTR symmetrical +2, finger to nose normal      NIH Stroke Scale:    Level of Consciousness: 0 - alert  LOC Questions: 0 - answers both correctly  LOC Commands: 0 - performs both correctly  Best Gaze: 0 - normal  Visual: 0 - no visual loss  Facial Palsy: 0 - normal  Motor Left Arm: 0 - no drift  Motor Right Arm: 1 - drift  Motor Left Le - no drift  Motor Right Le - no drift  Limb Ataxia: 0 - absent  Sensory: 1 - mild to moderate loss  Best Language: 0 - no aphasia  Dysarthria: 1 - mild to moderate dysarthria  Extinction and Inattention: 0 - no neglect  NIH Stroke Scale Total: 3    Laboratory:  CMP:     Recent Labs  Lab 10/28/17  0425   CALCIUM 8.5*   ALBUMIN 3.2*   PROT 6.3      K 3.7   CO2 22*      BUN 18   CREATININE 0.8   ALKPHOS 57   ALT 7*   AST 14   BILITOT 0.5     CBC:     Recent Labs  Lab 10/28/17  0425   WBC 5.72   RBC 4.27   HGB 11.8*   HCT 36.3*      MCV 85   MCH 27.6   MCHC 32.5     Lipid Panel:     Recent Labs  Lab 10/26/17  1209   CHOL 196   LDLCALC 133.6   HDL 52   TRIG 52     Coagulation:   Recent Labs  Lab 10/26/17  1209  10/28/17  0425   INR 1.1  --   --    APTT  --   < > 27.5       Recent Labs  Lab 10/26/17  2139   HGBA1C 4.8       Diagnostic Results:    CTA, 10/26/17:  No acute hemorrhage or major vascular infarct. CT angiogram of the head and neck demonstrates minimal atherosclerotic disease. No high-grade stenosis or major branch occlusion identified    MRI brain, 10.27/17:    Acute lacunar-type infarct along the posterior margin of the left putamen. No hemorrhage or mass effect. Modest chronic microvascular ischemic change.    ECG, 10/26/17:  Sinus bradycardia, otherwise normal    TTE, 10/27/17:   1 - Normal left ventricular systolic function (EF 60-65%).     2 - Normal left ventricular diastolic function.     3 - Normal right ventricular systolic function .

## 2017-10-28 NOTE — ASSESSMENT & PLAN NOTE
A 52 year old female with PMHx of HTN and smoking who p/w RSW, numbness and aphasia s/p tPA. CTA negative. She was admitted to [facility] for close monitoring after tPA. Speech and LSW/numbness improving. Stepped down from [facility].    - sBP <160; BP well controlled, currently at 144/86, she is off Cardene,   - Antithrombotics for secondary stroke prevention: Antiplatelets: Aspirin: 81 mg oral if MRI negative for hemorrhagic transformation   - secondary stroke prevention:  -- continue ASA 81 mg oral daily  -- continue Atorvastatin- 40 mg oral daily  -- Aggressive risk factor modification: Hypertension, HLD and Smoking   -- Rehab Efforts: Occupational Therapy, Speech and Language Pathology and Physical Medicine and Rehabilitation: Pending evaluation   -- Speech: cleared for regular diet and thin liquids   --  VTE Prophylaxis: Mechanical prophylaxis for now  -- Diagnostics: , A1C 4.8, TSH 0.94. Echo WNL, MRI did not reveal hemorrhagic conversion

## 2017-10-28 NOTE — PT/OT/SLP DISCHARGE
Occupational Therapy Discharge Summary    Chloé Willams  MRN: [id number]   Tissue plasminogen activator (t-PA) administered at other facility within 24 hours prior to current admission   Patient Discharged from acute Occupational Therapy on 10/28.  Please refer to prior OT note dated on 10/28 for functional status.     Assessment:   Patient appropriate for care in another setting.  GOALS:    Occupational Therapy Goals        Problem: Occupational Therapy Goal    Goal Priority Disciplines Outcome Interventions   Occupational Therapy Goal     OT, PT/OT     Description:  Goals set 10/28 to be addressed for 7 days with expiration date, 11/4  Patient will increase functional independence with ADLs by performing:  Patient will demonstrate stand pivot transfers with modified independence.   Not met  Patient will demonstrate grooming while standing with modified independence.   Not met  Patient will demonstrate upper body dressing with modified independence.   Not met  Patient will demonstrate lower body dressing with modified independence.   Not met  Patient will demonstrate toileting with modified independence.   Not met  Patient will demonstrate bathing while seated EOB with modified independence.   Not met  Patient and/or patient's family will verbalize understanding of stroke prevention guidelines, personal risk factors and stroke warning signs via teachback method.  Not met                         Reasons for Discontinuation of Therapy Services  Transfer to alternate level of care.      Plan:  Patient Discharged to: Outpatient Therapy Services.  ROSALIND Antoine  10/28/2017

## 2017-10-28 NOTE — DISCHARGE SUMMARY
[facility]  Vascular Neurology  Comprehensive Stroke Center  Discharge Summary     Summary:     Admit Date: 10/26/2017 11:40 AM    Discharge Date and Time:  10/28/2017 6:57 PM    Attending Physician: Reynold Cabrera MD    Discharge Provider: Muriel Loja MD    History of Present Illness: 51 y/o female with sudden onset of aphasia and right sided paresis @ 0730 today. In ED at [facility] was given tPA. Brought here for CTA multiphase.    Hospital Course (synopsis of major diagnoses, care, treatment, and services provided during the course of the hospital stay): Received tPA at OSH, transferred here for further evaluation with CTA multiphase, MRI and higher level of care. She was monitored over the course of hospital stay. Speech and numbness improved. No new neurological findings observed.  SBP well controlled and off cardene. Sating well at [facility]. stepped down from the [facility] on 10/27. speech evaluation cleared for regular diet. AF and WBC WNL. H/H stable. All labs WNL. PT/OT evaluation cleared for discharge to home on 10/28.      Neurologic Chief Complaint: RSW, numbness and aphasia     Subjective:     Interval History: Patient is seen for follow-up neurological assessment and treatment recommendations: Day 1 s/p tPA. NAEON. No issue per nursing. Speech and numbness improved. Exam improving. MRI brain pending. SBP well controlled and off cardene. Sating well at [facility].Passsed PARTH and pending speech evaluation. AF and WBC WNL. H/H stable. All labs WNL. Pending PT/OT evaluation.      HPI, Past Medical, Family, and Social History remains the same as documented in the initial encounter.     Review of Systems   Constitutional: Negative for chills, diaphoresis and fever.   HENT: Negative for drooling, trouble swallowing and voice change.    Eyes: Negative for photophobia and visual disturbance.   Respiratory: Negative for cough, chest tightness, shortness of breath and wheezing.    Cardiovascular:  Negative for chest pain and palpitations.   Gastrointestinal: Negative for abdominal pain, nausea and vomiting.   Endocrine: Negative for polyphagia and polyuria.   Genitourinary: Negative for dysuria, hematuria and urgency.   Musculoskeletal: Negative for arthralgias, joint swelling, myalgias and neck pain.   Skin: Negative for pallor and rash.   Neurological: Positive for facial asymmetry, speech difficulty, weakness and numbness. Negative for dizziness and headaches.   Psychiatric/Behavioral: Negative for agitation and confusion. The patient is not nervous/anxious.       She report some numbness in her R hand otherwise negative   Scheduled Meds:    Continuous Infusions:    PRN Meds:    Objective:     Vital Signs (Most Recent):  Temp: 98.3 °F (36.8 °C) (10/28/17 0725)  Pulse: 64 (10/28/17 1100)  Resp: 18 (10/28/17 0725)  BP: 136/74 (10/28/17 0725)  SpO2: 95 % (10/28/17 0725)  BP Location: Right arm    Vital Signs Range (Last 24H):  Temp:  [97.6 °F (36.4 °C)-98.3 °F (36.8 °C)]   Pulse:  [58-74]   Resp:  [17-20]   BP: (136-157)/(74-86)   SpO2:  [95 %-97 %]   BP Location: Right arm    Physical Exam   Constitutional: She is oriented to person, place, and time. She appears well-developed and well-nourished. She is cooperative. No distress.   HENT:   Head: Normocephalic and atraumatic.   Eyes: Conjunctivae and EOM are normal. Pupils are equal, round, and reactive to light.   Neck: Normal range of motion. Neck supple.   Cardiovascular: Normal rate and regular rhythm.  Exam reveals gallop and S3.    No murmur heard.  Pulmonary/Chest: Effort normal and breath sounds normal. No tachypnea. She has no wheezes. She has no rales. She exhibits no tenderness.   Abdominal: Soft. Bowel sounds are normal. She exhibits no distension. There is no tenderness.   Musculoskeletal: Normal range of motion. She exhibits no edema or tenderness.   Neurological: She is alert and oriented to person, place, and time.   Skin: No rash noted. She is  not diaphoretic. No cyanosis or erythema.   Psychiatric: She has a normal mood and affect. Her behavior is normal.     General: appears well-developed and well-nourished.   Eyes: EOMI, PERRLA, normal visual acuity and grossly normal VF  Cardiovascular: normal rate, regular rhythm and normal pulses  Chest: no distress, BEAE  Abdominal: soft, ND, NT, bowel sounds are normal.   Skin: no rash   Psych/Behavior: normal mood and affect.   Musculoskeletal: appropriate tone    Neurological: AAOX3, GCS E4V5M6, following commands, normal speech with mild dysarthria, mild R side pronation, CN II-XII grossly intact and face symm, full strength on the L side, R side extensors 4+/5, SILT except mild decrease to LT on R side, DTR symmetrical +2, finger to nose normal      NIH Stroke Scale:  Interval: 7 days or at discharge (whichever comes first)  Level of Consciousness: 0 - alert  LOC Questions: 0 - answers both correctly  LOC Commands: 0 - performs both correctly  Best Gaze: 0 - normal  Visual: 0 - no visual loss  Facial Palsy: 1 - minor  Motor Left Arm: 0 - no drift  Motor Right Arm: 0 - no drift  Motor Left Le - no drift  Motor Right Le - no drift  Limb Ataxia: 0 - absent  Sensory: 1 - mild to moderate loss  Best Language: 0 - no aphasia  Dysarthria: 1 - mild to moderate dysarthria  Extinction and Inattention: 0 - no neglect  NIH Stroke Scale Total: 3      Neurologic Chief Complaint: RSW, numbness and aphasia     Subjective:     HPI, Past Medical, Family, and Social History remains the same as documented in the initial encounter.     Review of Systems   She report some numbness in her R hand otherwise negative   Scheduled Meds:    Continuous Infusions:    PRN Meds:    Objective:     Vital Signs (Most Recent):  Temp: 98.3 °F (36.8 °C) (10/28/17 0725)  Pulse: 64 (10/28/17 1100)  Resp: 18 (10/28/17 0725)  BP: 136/74 (10/28/17 0725)  SpO2: 95 % (10/28/17 0725)  BP Location: Right arm    Vital Signs Range (Last 24H):  Temp:   [97.6 °F (36.4 °C)-98.3 °F (36.8 °C)]   Pulse:  [58-74]   Resp:  [17-20]   BP: (136-157)/(74-86)   SpO2:  [95 %-97 %]   BP Location: Right arm    Physical Exam  General: appears well-developed and well-nourished.   Eyes: EOMI, PERRLA, normal visual acuity and grossly normal VF  Cardiovascular: normal rate, regular rhythm and normal pulses  Chest: no distress, BEAE  Abdominal: soft, ND, NT, bowel sounds are normal.   Skin: no rash   Psych/Behavior: normal mood and affect.   Musculoskeletal: appropriate tone    Neurological: AAOX3, GCS E4V5M6, following commands, normal speech with mild dysarthria, mild R side pronation, CN II-XII grossly intact and face symm, full strength on the L side, R side extensors 4+/5, SILT except mild decrease to LT on R side, DTR symmetrical +2, finger to nose normal      NIH Stroke Scale:    Level of Consciousness: 0 - alert  LOC Questions: 0 - answers both correctly  LOC Commands: 0 - performs both correctly  Best Gaze: 0 - normal  Visual: 0 - no visual loss  Facial Palsy: 0 - normal  Motor Left Arm: 0 - no drift  Motor Right Arm: 1 - drift  Motor Left Le - no drift  Motor Right Le - no drift  Limb Ataxia: 0 - absent  Sensory: 1 - mild to moderate loss  Best Language: 0 - no aphasia  Dysarthria: 1 - mild to moderate dysarthria  Extinction and Inattention: 0 - no neglect  NIH Stroke Scale Total: 3    Laboratory:  CMP:     Recent Labs  Lab 10/28/17  0425   CALCIUM 8.5*   ALBUMIN 3.2*   PROT 6.3      K 3.7   CO2 22*      BUN 18   CREATININE 0.8   ALKPHOS 57   ALT 7*   AST 14   BILITOT 0.5     CBC:     Recent Labs  Lab 10/28/17  0425   WBC 5.72   RBC 4.27   HGB 11.8*   HCT 36.3*      MCV 85   MCH 27.6   MCHC 32.5     Lipid Panel:     Recent Labs  Lab 10/26/17  1209   CHOL 196   LDLCALC 133.6   HDL 52   TRIG 52     Coagulation:   Recent Labs  Lab 10/26/17  1209  10/28/17  0425   INR 1.1  --   --    APTT  --   < > 27.5       Recent Labs  Lab 10/26/17  2139   HGBA1C  4.8       Diagnostic Results:    CTA, 10/26/17:  No acute hemorrhage or major vascular infarct. CT angiogram of the head and neck demonstrates minimal atherosclerotic disease. No high-grade stenosis or major branch occlusion identified    MRI brain, 10.27/17:    Acute lacunar-type infarct along the posterior margin of the left putamen. No hemorrhage or mass effect. Modest chronic microvascular ischemic change.    ECG, 10/26/17:  Sinus bradycardia, otherwise normal    TTE, 10/27/17:   1 - Normal left ventricular systolic function (EF 60-65%).     2 - Normal left ventricular diastolic function.     3 - Normal right ventricular systolic function .       Assessment/Plan:     Interventions: IV t-PA    Complications: None    Neurological deficit at discharge: Weakness: right, Dysarthria     Disposition: Home or Self Care    Final Active Diagnoses:    Diagnosis Date Noted POA    PRINCIPAL PROBLEM:  Tissue plasminogen activator (t-PA) administered at other facility within 24 hours prior to current admission [Z92.82] 10/26/2017 Not Applicable    Acute ischemic left MCA stroke [I63.512] 10/27/2017 Unknown    HTN (hypertension) [I10] 10/27/2017 Unknown    Smoking [F17.200] 10/27/2017 Unknown      Problems Resolved During this Admission:    Diagnosis Date Noted Date Resolved POA    Right sided weakness [R53.1] 10/27/2017 10/27/2017 Unknown    Numbness and tingling of right upper extremity [R20.0, R20.2] 10/27/2017 10/27/2017 Unknown    Aphasia [R47.01] 10/27/2017 10/27/2017 Unknown     * Tissue plasminogen activator (t-PA) administered at other facility within 24 hours prior to current admission    exam improving and sBP well controlled overnight   No evidence of hemorrhagic conversion in the brain imaging from yesterday          Smoking    Counseled on smoking, she is willing to use nicotine patch        HTN (hypertension)    - unclear home BP medication, will have her on amlodipine 10 mg daily        Acute ischemic  left MCA stroke    A 52 year old female with PMHx of HTN and smoking who p/w RSW, numbness and aphasia s/p tPA. CTA negative. She was admitted to [facility] for close monitoring after tPA. Speech and LSW/numbness improving. Stepped down from [facility].    - unclear home BP medication, will have her on amlodipine 10 mg daily  - continue ASA 81 mg oral daily  - continue Atorvastatin 40 mg oral daily  - counseled on diet, exercise and smoking, she is willing to use nicotine patch  - will refer to ambulatory speech therapy x3 per week  - will have her f/u in ambulatory vascular neurology clinic              Recommendations:     Post-discharge complication risks: None    Stroke Education given to: patient and family    Follow-up in Stroke Clinic in 4 weeks    Discharge Plan:  Antithrombotics: Aspirin 81mg  Statin: Atorvastatin 40mg  Smoking Cessation    Follow Up:    Patient Instructions:     Ambulatory Referral to Vascular Neurology   Referral Priority: Routine Referral Type: Consultation   Referral Reason: Specialty Services Required    Requested Specialty: Vascular Neurology    Number of Visits Requested: 1      Ambulatory Referral to Speech Therapy   Referral Priority: Routine Referral Type: Speech Therapy   Referral Reason: Specialty Services Required    Requested Specialty: Speech Pathology    Number of Visits Requested: 1        Medications:  Reconciled Home Medications:   Discharge Medication List as of 10/28/2017 10:45 AM      START taking these medications    Details   amLODIPine (NORVASC) 10 MG tablet Take 1 tablet (10 mg total) by mouth once daily., Starting Sat 10/28/2017, Until Sun 10/28/2018, Normal      aspirin 81 MG Chew Take 1 tablet (81 mg total) by mouth once daily., Starting Sun 10/29/2017, Until Mon 10/29/2018, OTC      atorvastatin (LIPITOR) 40 MG tablet Take 1 tablet (40 mg total) by mouth once daily., Starting Sun 10/29/2017, Until Mon 10/29/2018, Normal      nicotine (NICODERM CQ) 7 mg/24 hr Place 1 patch  onto the skin once daily., Starting Sat 10/28/2017, Until Mon 11/27/2017, [facility]             Muriel Loja MD  Comprehensive Stroke Center  Department of Vascular Neurology   [facility]

## 2017-10-28 NOTE — PT/OT/SLP EVAL
"Occupational Therapy  Evaluation/Treatment    Chloé Willams   MRN: [id number]   Admitting Diagnosis: Tissue plasminogen activator (t-PA) administered at other facility within 24 hours prior to current admission    OT Date of Treatment: 10/28/17   OT Start Time: 0655  OT Stop Time: 0728  OT Total Time (min): 33 min    Billable Minutes:  Evaluation 22  Therapeutic Activity 11    Diagnosis: Tissue plasminogen activator (t-PA) administered at other facility within 24 hours prior to current admission     Past Medical History:   Diagnosis Date    Hypertension       History reviewed. No pertinent surgical history.    Referring physician: Jazmin  Date referred to OT: 10/27  General Precautions: Standard, aspiration, fall  Orthopedic Precautions: N/A  Braces: N/A    Do you have any cultural, spiritual, Shinto conflicts, given your current situation?: Shinto     Patient History:  Prior level of function:   Patient resides in [facility] point with  in one story home with no steps to enter.   Patient is right handed.  PTA patient independent with ADLs, including driving.  Works:  sitter.  Hobbies:  fishing.  Roles/Responsibilities:  wife, mother, grandmother, sitter, household chores:  cooking, cleaning, laundry.     Subjective:  Communicated with nurse prior to session.  Patient:  "10 minutes after I got to work, my right leg wanted to give out on my.  I had no feeling in my leg and then it got heavy.  I still have to drag my right leg and my right arm is numb."  Pain/Comfort  Pain Rating 1: 0/10  Pain Rating Post-Intervention 1: 0/10    Objective:  Patient found with: telemetry, peripheral IV   present.    Cognitive Exam:  Oriented to: Person, Place, Time and Situation  Follows Commands/attention: Follows one-step commands  Memory: further assessment needed  Safety awareness/insight to disability: intact  Coping skills/emotional control: Appropriate to situation    Visual/perceptual:  Intact; wears reading " glasses    Physical Exam:  Postural examination/scapula alignment: Rounded shoulder  Skin integrity: Visible skin intact  Edema: None noted     Sensation:   Numbness, right UE.      Upper Extremity Range of Motion:  Right Upper Extremity: WNL  Left Upper Extremity: WNL    Upper Extremity Strength:  Right Upper Extremity: WNL  Left Upper Extremity: WNL    Functional Mobility:  Bed Mobility:  Rolling/Turning to Left: Independent  Rolling/Turning Right: Independent  Scooting/Bridging: Independent  Supine to Sit: Independent  Sit to Supine: Independent    Transfers:  Sit <> Stand Assistance: Supervision  Sit <> Stand Assistive Device: No Assistive Device  Bed <> Chair Technique: Stand Pivot  Bed <> Chair Transfer Assistance: Supervision  Bed <> Chair Assistive Device: No Assistive Device    Activities of Daily Living:  UE Dressing Level of Assistance: Supervision  LE Dressing Level of Assistance: Supervision  Grooming Position: Standing  Grooming Level of Assistance: Supervision  Toileting Where Assessed: Toilet  Toileting Level of Assistance: Supervision         Additional Treatment:   Patient/ Family education provided for stroke warning signs, prevention guidelines and personal risk factors.  Patient/ Family verbalizing understanding via teach back method.   Patient education provided on role of OT, ADLs, transfers and fall prevention.  Patient verbalizing understanding via teach back method. Patient and family instructed on need to call for assistance for toileting needs and when getting up.  Continued education, patient/ family training recommended.  Patient alert and oriented x 3; able to follow 4/4 one step commands.  Patient attentive and interactive throughout the session.  Patient able to identify 5/5 body parts.  Able to name 5/5 objects.  Able to sequence 7/7 days of the week and 12/12 months of the year.  Patient's functional status and disposition recommendation discussed with patient,  and PT.   "White board updated in patient's room.  OT asked if there were any other questions; patient/ family had no further questions.       AM-PAC 6 CLICK ADL  How much help from another person does this patient currently need?  1 = Unable, Total/Dependent Assistance  2 = A lot, Maximum/Moderate Assistance  3 = A little, Minimum/Contact Guard/Supervision  4 = None, Modified [place]/Independent    Putting on and taking off regular lower body clothing? : 3  Bathing (including washing, rinsing, drying)?: 3  Toileting, which includes using toilet, bedpan, or urinal? : 3  Putting on and taking off regular upper body clothing?: 3  Taking care of personal grooming such as brushing teeth?: 3  Eating meals?: 3  Total Score: 18    AM-PAC Raw Score CMS "G-Code Modifier Level of Impairment Assistance   6 % Total / Unable   7 - 9 CM 80 - 100% Maximal Assist   10-14 CL 60 - 80% Moderate Assist   15 - 19 CK 40 - 60% Moderate Assist   20 - 22 CJ 20 - 40% Minimal Assist   23 CI 1-20% SBA / CGA   24 CH 0% Independent/ Mod I       Patient left supine with all lines intact and call button in reach    Assessment:  Chloé Willams is a 52 y.o. female with a medical diagnosis of Tissue plasminogen activator (t-PA) administered at other facility within 24 hours prior to current admission and presents with performance deficits of physical skills including impaired balance, mobility,  coordination, gross motor coordination, sensation and endurance.  These performance deficits have resulted in activity limitations including but not limited to:  transfers, ascending/ descending stairs, walking short and long distances, walking around obstacles, transitional movement patterns (kneeling, bending);  upper body dressing, lower body dressing, brushing teeth, toileting, bathing, carrying objects, shopping, meal preparation, and household chores.   Patient's role as wife, mother, grandmother, sitter, and independent caretaker for self has been " affected. Patient will benefit from skilled OT services to maximize level of independence with self-care skills and functional mobility.      Pt evaluation falls under low complexity for evaluation coding due to performance deficits noted in 1-3 areas as stated above and 0 co-morbities affecting current functional status. Data obtained from problem focused assessments. No modifications or assistance was required for completion of evaluation. Only brief occupational profile and history review completed.    Rehab identified problem list/impairments: Rehab identified problem list/impairments: impaired self care skills, impaired functional mobilty, impaired balance, gait instability    Rehab potential is good.    Activity tolerance: Good    Discharge recommendations: Discharge Facility/Level Of Care Needs: home     Barriers to discharge: Barriers to Discharge: None    Equipment recommendations: none     GOALS:    Occupational Therapy Goals        Problem: Occupational Therapy Goal    Goal Priority Disciplines Outcome Interventions   Occupational Therapy Goal     OT, PT/OT     Description:  Goals set 10/28 to be addressed for 7 days with expiration date, 11/4  Patient will increase functional independence with ADLs by performing:  Patient will demonstrate stand pivot transfers with modified independence.   Not met  Patient will demonstrate grooming while standing with modified independence.   Not met  Patient will demonstrate upper body dressing with modified independence.   Not met  Patient will demonstrate lower body dressing with modified independence.   Not met  Patient will demonstrate toileting with modified independence.   Not met  Patient will demonstrate bathing while seated EOB with modified independence.   Not met  Patient and/or patient's family will verbalize understanding of stroke prevention guidelines, personal risk factors and stroke warning signs via teachback method.  Not met                            PLAN:  Patient to be seen 3 x/week to address the above listed problems via self-care/home management  Plan of Care expires: 11/25/17  Plan of Care reviewed with: patient, spouse         ROSALIND Montoya  10/28/2017

## 2017-10-28 NOTE — SUBJECTIVE & OBJECTIVE
Neurologic Chief Complaint: RSW, numbness and aphasia     Subjective:     Interval History: Patient is seen for follow-up neurological assessment and treatment recommendations: Day 1 s/p tPA. NAEON. No issue per nursing. Speech and numbness improved. Exam improving. MRI brain pending. SBP well controlled and off cardene. Sating well at RA.Passsed PARTH and pending speech evaluation. AF and WBC WNL. H/H stable. All labs WNL. Pending PT/OT evaluation.      HPI, Past Medical, Family, and Social History remains the same as documented in the initial encounter.     Review of Systems   Constitutional: Negative for chills, diaphoresis and fever.   HENT: Negative for drooling, trouble swallowing and voice change.    Eyes: Negative for photophobia and visual disturbance.   Respiratory: Negative for cough, chest tightness, shortness of breath and wheezing.    Cardiovascular: Negative for chest pain and palpitations.   Gastrointestinal: Negative for abdominal pain, nausea and vomiting.   Endocrine: Negative for polyphagia and polyuria.   Genitourinary: Negative for dysuria, hematuria and urgency.   Musculoskeletal: Negative for arthralgias, joint swelling, myalgias and neck pain.   Skin: Negative for pallor and rash.   Neurological: Positive for facial asymmetry, speech difficulty, weakness and numbness. Negative for dizziness and headaches.   Psychiatric/Behavioral: Negative for agitation and confusion. The patient is not nervous/anxious.       She report some numbness in her R hand otherwise negative   Scheduled Meds:    Continuous Infusions:    PRN Meds:    Objective:     Vital Signs (Most Recent):  Temp: 98.3 °F (36.8 °C) (10/28/17 0725)  Pulse: 64 (10/28/17 1100)  Resp: 18 (10/28/17 0725)  BP: 136/74 (10/28/17 0725)  SpO2: 95 % (10/28/17 0725)  BP Location: Right arm    Vital Signs Range (Last 24H):  Temp:  [97.6 °F (36.4 °C)-98.3 °F (36.8 °C)]   Pulse:  [58-74]   Resp:  [17-20]   BP: (136-157)/(74-86)   SpO2:  [95 %-97 %]    BP Location: Right arm    Physical Exam   Constitutional: She is oriented to person, place, and time. She appears well-developed and well-nourished. She is cooperative. No distress.   HENT:   Head: Normocephalic and atraumatic.   Eyes: Conjunctivae and EOM are normal. Pupils are equal, round, and reactive to light.   Neck: Normal range of motion. Neck supple.   Cardiovascular: Normal rate and regular rhythm.  Exam reveals gallop and S3.    No murmur heard.  Pulmonary/Chest: Effort normal and breath sounds normal. No tachypnea. She has no wheezes. She has no rales. She exhibits no tenderness.   Abdominal: Soft. Bowel sounds are normal. She exhibits no distension. There is no tenderness.   Musculoskeletal: Normal range of motion. She exhibits no edema or tenderness.   Neurological: She is alert and oriented to person, place, and time.   Skin: No rash noted. She is not diaphoretic. No cyanosis or erythema.   Psychiatric: She has a normal mood and affect. Her behavior is normal.     General: appears well-developed and well-nourished.   Eyes: EOMI, PERRLA, normal visual acuity and grossly normal VF  Cardiovascular: normal rate, regular rhythm and normal pulses  Chest: no distress, BEAE  Abdominal: soft, ND, NT, bowel sounds are normal.   Skin: no rash   Psych/Behavior: normal mood and affect.   Musculoskeletal: appropriate tone    Neurological: AAOX3, GCS [id number], following commands, normal speech with mild dysarthria, mild R side pronation, CN II-XII grossly intact and face symm, full strength on the L side, R side extensors 4+/5, SILT except mild decrease to LT on R side, DTR symmetrical +2, finger to nose normal      NIH Stroke Scale:  Interval: 7 days or at discharge (whichever comes first)  Level of Consciousness: 0 - alert  LOC Questions: 0 - answers both correctly  LOC Commands: 0 - performs both correctly  Best Gaze: 0 - normal  Visual: 0 - no visual loss  Facial Palsy: 1 - minor  Motor Left Arm: 0 - no  drift  Motor Right Arm: 0 - no drift  Motor Left Le - no drift  Motor Right Le - no drift  Limb Ataxia: 0 - absent  Sensory: 1 - mild to moderate loss  Best Language: 0 - no aphasia  Dysarthria: 1 - mild to moderate dysarthria  Extinction and Inattention: 0 - no neglect  NIH Stroke Scale Total: 3      Neurologic Chief Complaint: RSW, numbness and aphasia     Subjective:     HPI, Past Medical, Family, and Social History remains the same as documented in the initial encounter.     Review of Systems   She report some numbness in her R hand otherwise negative   Scheduled Meds:    Continuous Infusions:    PRN Meds:    Objective:     Vital Signs (Most Recent):  Temp: 98.3 °F (36.8 °C) (10/28/17 07)  Pulse: 64 (10/28/17 1100)  Resp: 18 (10/28/17 0725)  BP: 136/74 (10/28/17 07)  SpO2: 95 % (10/28/17 0725)  BP Location: Right arm    Vital Signs Range (Last 24H):  Temp:  [97.6 °F (36.4 °C)-98.3 °F (36.8 °C)]   Pulse:  [58-74]   Resp:  [17-20]   BP: (136-157)/(74-86)   SpO2:  [95 %-97 %]   BP Location: Right arm    Physical Exam  General: appears well-developed and well-nourished.   Eyes: EOMI, PERRLA, normal visual acuity and grossly normal VF  Cardiovascular: normal rate, regular rhythm and normal pulses  Chest: no distress, BEAE  Abdominal: soft, ND, NT, bowel sounds are normal.   Skin: no rash   Psych/Behavior: normal mood and affect.   Musculoskeletal: appropriate tone    Neurological: AAOX3, GCS [id number], following commands, normal speech with mild dysarthria, mild R side pronation, CN II-XII grossly intact and face symm, full strength on the L side, R side extensors 4+/5, SILT except mild decrease to LT on R side, DTR symmetrical +2, finger to nose normal      NIH Stroke Scale:    Level of Consciousness: 0 - alert  LOC Questions: 0 - answers both correctly  LOC Commands: 0 - performs both correctly  Best Gaze: 0 - normal  Visual: 0 - no visual loss  Facial Palsy: 0 - normal  Motor Left Arm: 0 - no drift  Motor  Right Arm: 1 - drift  Motor Left Le - no drift  Motor Right Le - no drift  Limb Ataxia: 0 - absent  Sensory: 1 - mild to moderate loss  Best Language: 0 - no aphasia  Dysarthria: 1 - mild to moderate dysarthria  Extinction and Inattention: 0 - no neglect  NIH Stroke Scale Total: 3    Laboratory:  CMP:     Recent Labs  Lab 10/28/17  0425   CALCIUM 8.5*   ALBUMIN 3.2*   PROT 6.3      K 3.7   CO2 22*      BUN 18   CREATININE 0.8   ALKPHOS 57   ALT 7*   AST 14   BILITOT 0.5     CBC:     Recent Labs  Lab 10/28/17  0425   WBC 5.72   RBC 4.27   HGB 11.8*   HCT 36.3*      MCV 85   MCH 27.6   MCHC 32.5     Lipid Panel:     Recent Labs  Lab 10/26/17  1209   CHOL 196   LDLCALC 133.6   HDL 52   TRIG 52     Coagulation:   Recent Labs  Lab 10/26/17  1209  10/28/17  0425   INR 1.1  --   --    APTT  --   < > 27.5       Recent Labs  Lab 10/26/17  2139   HGBA1C 4.8       Diagnostic Results:    CTA, 10/26/17:  No acute hemorrhage or major vascular infarct. CT angiogram of the head and neck demonstrates minimal atherosclerotic disease. No high-grade stenosis or major branch occlusion identified    MRI brain, 10.27/17:    Acute lacunar-type infarct along the posterior margin of the left putamen. No hemorrhage or mass effect. Modest chronic microvascular ischemic change.    ECG, 10/26/17:  Sinus bradycardia, otherwise normal    TTE, 10/27/17:   1 - Normal left ventricular systolic function (EF 60-65%).     2 - Normal left ventricular diastolic function.     3 - Normal right ventricular systolic function .

## 2017-10-28 NOTE — PLAN OF CARE
Problem: Physical Therapy Goal  Goal: Physical Therapy Goal  Goals to be met by: 11/3/17     Patient will increase functional independence with mobility by performing:    Sit to stand transfer with Modified [place] using No Assistive Device.  Bed to chair transfer via Stand Pivot with Modified [place] using No Assistive Device.  Gait  x 100 feet with Modified [place] using No Assistive Device, with increased R knee control, heel contact and appropriate weight shift with <25% cues.   Dynamic standing for 5 minutes with Modified [place] using No Assistive Device.  Able to tolerate exercise for 15-20 reps with independence.  Patient and family able to teachback stroke & positioning education independently.  Outcome: Ongoing (interventions implemented as appropriate)    Pt would benefit from OPPT upon discharge. Goals remain appropriate.  Appropriate to transfer with: RN/PCT SBA of 1 person  Hanny Riggs PT, DPT  10/28/2017  Pager: [phone]

## 2017-10-28 NOTE — ASSESSMENT & PLAN NOTE
A 52 year old female with PMHx of HTN and smoking who p/w RSW, numbness and aphasia s/p tPA. CTA negative. She was admitted to [facility] for close monitoring after tPA. Speech and LSW/numbness improving. Stepped down from [facility].    - unclear home BP medication, will have her on amlodipine 10 mg daily  - continue ASA 81 mg oral daily  - continue Atorvastatin 40 mg oral daily  - counseled on diet, exercise and smoking, she is willing to use nicotine patch  - will refer to ambulatory speech therapy x3 per week  - will have her f/u in ambulatory vascular neurology clinic

## 2017-10-28 NOTE — PROGRESS NOTES
[facility]  Vascular Neurology  Comprehensive Stroke Center  Progress Note    Assessment/Plan:     10/27: Day s/p tPA. NAEON. No issue per nursing. Speech and numbness improved. Exam improving. MRI brain pending. SBP well controlled and off cardene. Sating well at RA.Passsed PARTH and pending speech evaluation. AF and WBC WNL. H/H stable. All labs WNL. Pending PT/OT evaluation.  10/28: stepped down from the [facility] yesterday. NAEON. No new neurological complaints. Speech has improved. Numbness comes and goes but overall improved. Seen by PT/OT and cleared for being discharged home      * Tissue plasminogen activator (t-PA) administered at other facility within 24 hours prior to current admission    exam improving and sBP well controlled overnight   No evidence of hemorrhagic conversion in the brain imaging from yesterday      Smoking    Stroke risk factor  -- Counseling about smoking cessation   -- Nicotine patch PRN        HTN (hypertension)    -- Keep SBP <160  -- She is off Cardene   -- currently at 144/86        Acute ischemic left MCA stroke    A 52 year old female with PMHx of HTN and smoking who p/w RSW, numbness and aphasia s/p tPA. CTA negative. She was admitted to [facility] for close monitoring after tPA. Speech and LSW/numbness improving. Stepped down from [facility].    - sBP <160; BP well controlled, currently at 144/86, she is off Cardene,   - Antithrombotics for secondary stroke prevention: Antiplatelets: Aspirin: 81 mg oral if MRI negative for hemorrhagic transformation   - secondary stroke prevention:  -- continue ASA 81 mg oral daily  -- continue Atorvastatin- 40 mg oral daily  -- Aggressive risk factor modification: Hypertension, HLD and Smoking   -- Rehab Efforts: Occupational Therapy, Speech and Language Pathology and Physical Medicine and Rehabilitation: Pending evaluation   -- Speech: cleared for regular diet and thin liquids   --  VTE Prophylaxis: Mechanical prophylaxis for now  --  Diagnostics: , A1C 4.8, TSH 0.94. Echo WNL, MRI did not reveal hemorrhagic conversion        Received tissue plasminogen activator (t-PA) less than 24 hours prior to arrival    Symptoms improving  No hemorrhagic conversion on the brain MRI            Neurologic Chief Complaint: RSW, numbness and aphasia     Subjective:     Interval History: Patient is seen for follow-up neurological assessment and treatment recommendations: Day 1 s/p tPA. NAEON. No issue per nursing. Speech and numbness improved. Exam improving. SBP well controlled and off cardene. Sating well at Ra. speech evaluation cleared for regular diet . All labs WNL. PT/OT cleared for D/c home    HPI, Past Medical, Family, and Social History remains the same as documented in the initial encounter.     Review of Systems   Constitutional: Negative for chills, diaphoresis and fever.   HENT: Negative for drooling, trouble swallowing and voice change.    Eyes: Negative for photophobia and visual disturbance.   Respiratory: Negative for cough, chest tightness, shortness of breath and wheezing.    Cardiovascular: Negative for chest pain and palpitations.   Gastrointestinal: Negative for abdominal pain, nausea and vomiting.   Endocrine: Negative for polyphagia and polyuria.   Genitourinary: Negative for dysuria, hematuria and urgency.   Musculoskeletal: Negative for arthralgias, joint swelling, myalgias and neck pain.   Skin: Negative for pallor and rash.   Neurological: Positive for facial asymmetry, speech difficulty, weakness and numbness. Negative for dizziness and headaches.   Psychiatric/Behavioral: Negative for agitation and confusion. The patient is not nervous/anxious.       She report some numbness in her R hand otherwise negative   Scheduled Meds:   aspirin  81 mg Oral Daily    atorvastatin  40 mg Oral Daily    heparin (porcine)  5,000 Units Subcutaneous Q8H    sodium chloride 0.9%  3 mL Intravenous Q8H     Continuous Infusions:     PRN  Meds:labetalol    Objective:     Vital Signs (Most Recent):  Temp: 98.3 °F (36.8 °C) (10/28/17 0725)  Pulse: 64 (10/28/17 1100)  Resp: 18 (10/28/17 0725)  BP: 136/74 (10/28/17 0725)  SpO2: 95 % (10/28/17 0725)  BP Location: Right arm    Vital Signs Range (Last 24H):  Temp:  [97.6 °F (36.4 °C)-98.9 °F (37.2 °C)]   Pulse:  [58-74]   Resp:  [15-27]   BP: (124-157)/(74-86)   SpO2:  [94 %-100 %]   BP Location: Right arm    Physical Exam   Constitutional: She is oriented to person, place, and time. She appears well-developed and well-nourished. She is cooperative. No distress.   HENT:   Head: Normocephalic and atraumatic.   Eyes: Conjunctivae and EOM are normal. Pupils are equal, round, and reactive to light.   Neck: Normal range of motion. Neck supple.   Cardiovascular: Normal rate and regular rhythm.  Exam reveals gallop and S3.    No murmur heard.  Pulmonary/Chest: Effort normal and breath sounds normal. No tachypnea. She has no wheezes. She has no rales. She exhibits no tenderness.   Abdominal: Soft. Bowel sounds are normal. She exhibits no distension. There is no tenderness.   Musculoskeletal: Normal range of motion. She exhibits no edema or tenderness.   Neurological: She is alert and oriented to person, place, and time.   Skin: No rash noted. She is not diaphoretic. No cyanosis or erythema.   Psychiatric: She has a normal mood and affect. Her behavior is normal.     General: appears well-developed and well-nourished.   Eyes: EOMI, PERRLA, normal visual acuity and grossly normal VF  Cardiovascular: normal rate, regular rhythm and normal pulses  Chest: no distress, BEAE  Abdominal: soft, ND, NT, bowel sounds are normal.   Skin: no rash   Psych/Behavior: normal mood and affect.   Musculoskeletal: appropriate tone    Neurological: AAOX3, GCS E4V5M6, following commands, normal speech with mild dysarthria, mild R side pronation, CN II-XII grossly intact and face symm, full strength on the L side, R side extensors 4+/5,  SILT except mild decrease to LT on R side, DTR symmetrical +2, finger to nose normal      NIH Stroke Scale:  Interval: 7 days or at discharge (whichever comes first)  Level of Consciousness: 0 - alert  LOC Questions: 0 - answers both correctly  LOC Commands: 0 - performs both correctly  Best Gaze: 0 - normal  Visual: 0 - no visual loss  Facial Palsy: 1 - minor  Motor Left Arm: 0 - no drift  Motor Right Arm: 0 - no drift  Motor Left Le - no drift  Motor Right Le - no drift  Limb Ataxia: 0 - absent  Sensory: 1 - mild to moderate loss  Best Language: 0 - no aphasia  Dysarthria: 1 - mild to moderate dysarthria  Extinction and Inattention: 0 - no neglect  NIH Stroke Scale Total: 3      Neurologic Chief Complaint: RSW, numbness and aphasia     Subjective:     HPI, Past Medical, Family, and Social History remains the same as documented in the initial encounter.     Review of Systems   She report some numbness in her R hand otherwise negative   Scheduled Meds:   aspirin  81 mg Oral Daily    atorvastatin  40 mg Oral Daily    heparin (porcine)  5,000 Units Subcutaneous Q8H    sodium chloride 0.9%  3 mL Intravenous Q8H     Continuous Infusions:     PRN Meds:labetalol    Objective:     Vital Signs (Most Recent):  Temp: 98.3 °F (36.8 °C) (10/28/17 0725)  Pulse: 64 (10/28/17 1100)  Resp: 18 (10/28/17 0725)  BP: 136/74 (10/28/17 0725)  SpO2: 95 % (10/28/17 07)  BP Location: Right arm    Vital Signs Range (Last 24H):  Temp:  [97.6 °F (36.4 °C)-98.9 °F (37.2 °C)]   Pulse:  [58-74]   Resp:  [15-27]   BP: (124-157)/(74-86)   SpO2:  [94 %-100 %]   BP Location: Right arm    Physical Exam  General: appears well-developed and well-nourished.   Eyes: EOMI, PERRLA, normal visual acuity and grossly normal VF  Cardiovascular: normal rate, regular rhythm and normal pulses  Chest: no distress, BEAE  Abdominal: soft, ND, NT, bowel sounds are normal.   Skin: no rash   Psych/Behavior: normal mood and affect.   Musculoskeletal:  appropriate tone    Neurological: AAOX3, GCS E4V5M6, following commands, normal speech with mild dysarthria, mild R side pronation, CN II-XII grossly intact and face symm, full strength on the L side, R side extensors 4+/5, SILT except mild decrease to LT on R side, DTR symmetrical +2, finger to nose normal      NIH Stroke Scale:    Level of Consciousness: 0 - alert  LOC Questions: 0 - answers both correctly  LOC Commands: 0 - performs both correctly  Best Gaze: 0 - normal  Visual: 0 - no visual loss  Facial Palsy: 0 - normal  Motor Left Arm: 0 - no drift  Motor Right Arm: 1 - drift  Motor Left Le - no drift  Motor Right Le - no drift  Limb Ataxia: 0 - absent  Sensory: 1 - mild to moderate loss  Best Language: 0 - no aphasia  Dysarthria: 1 - mild to moderate dysarthria  Extinction and Inattention: 0 - no neglect  NIH Stroke Scale Total: 3    Laboratory:  CMP:     Recent Labs  Lab 10/28/17  0425   CALCIUM 8.5*   ALBUMIN 3.2*   PROT 6.3      K 3.7   CO2 22*      BUN 18   CREATININE 0.8   ALKPHOS 57   ALT 7*   AST 14   BILITOT 0.5     CBC:     Recent Labs  Lab 10/28/17  0425   WBC 5.72   RBC 4.27   HGB 11.8*   HCT 36.3*      MCV 85   MCH 27.6   MCHC 32.5     Lipid Panel:     Recent Labs  Lab 10/26/17  1209   CHOL 196   LDLCALC 133.6   HDL 52   TRIG 52     Coagulation:   Recent Labs  Lab 10/26/17  1209  10/28/17  0425   INR 1.1  --   --    APTT  --   < > 27.5       Recent Labs  Lab 10/26/17  2139   HGBA1C 4.8       Diagnostic Results:    CTA, 10/26/17:  No acute hemorrhage or major vascular infarct. CT angiogram of the head and neck demonstrates minimal atherosclerotic disease. No high-grade stenosis or major branch occlusion identified    MRI brain, 10.27/17:    Acute lacunar-type infarct along the posterior margin of the left putamen. No hemorrhage or mass effect. Modest chronic microvascular ischemic change.    ECG, 10/26/17:  Sinus bradycardia, otherwise normal    TTE, 10/27/17:   1 -  Normal left ventricular systolic function (EF 60-65%).     2 - Normal left ventricular diastolic function.     3 - Normal right ventricular systolic function .       Muriel Loja MD  Comprehensive Stroke Center  Department of Vascular Neurology   [facility]

## 2017-10-28 NOTE — ASSESSMENT & PLAN NOTE
exam improving and sBP well controlled overnight   No evidence of hemorrhagic conversion in the brain imaging from yesterday

## 2017-10-28 NOTE — PLAN OF CARE
Problem: Occupational Therapy Goal  Goal: Occupational Therapy Goal  Goals set 10/28 to be addressed for 7 days with expiration date, 11/4  Patient will increase functional independence with ADLs by performing:  Patient will demonstrate stand pivot transfers with modified independence.   Not met  Patient will demonstrate grooming while standing with modified independence.   Not met  Patient will demonstrate upper body dressing with modified independence.   Not met  Patient will demonstrate lower body dressing with modified independence.   Not met  Patient will demonstrate toileting with modified independence.   Not met  Patient will demonstrate bathing while seated EOB with modified independence.   Not met  Patient and/or patient's family will verbalize understanding of stroke prevention guidelines, personal risk factors and stroke warning signs via teachback method.  Not met         OT evaluation completed.  ROSALIND Antoine  10/28/2017

## 2017-10-30 ENCOUNTER — TELEPHONE (OUTPATIENT)
Dept: NEUROSURGERY | Facility: HOSPITAL | Age: 52
End: 2017-10-30

## 2017-11-01 DIAGNOSIS — I63.512 ACUTE ISCHEMIC LEFT MCA STROKE: Primary | ICD-10-CM

## 2017-11-01 NOTE — PT/OT/SLP DISCHARGE
Physical Therapy Discharge Summary    Chloé Willams  MRN: [id number]   Tissue plasminogen activator (t-PA) administered at other facility within 24 hours prior to current admission   Patient Discharged from acute Physical Therapy on 11/1/17.  Please refer to prior PT noted date on 10/28/17 for functional status.     Assessment:   Patient appropriate for care in another setting.  GOALS:    Physical Therapy Goals        Problem: Physical Therapy Goal    Goal Priority Disciplines Outcome Goal Variances Interventions   Physical Therapy Goal     PT/OT, PT Ongoing (interventions implemented as appropriate)     Description:  Goals to be met by: 11/3/17     Patient will increase functional independence with mobility by performing:    Sit to stand transfer with Modified [place] using No Assistive Device.  Bed to chair transfer via Stand Pivot with Modified [place] using No Assistive Device.  Gait  x 100 feet with Modified [place] using No Assistive Device, with increased R knee control, heel contact and appropriate weight shift with <25% cues.   Dynamic standing for 5 minutes with Modified [place] using No Assistive Device.  Able to tolerate exercise for 15-20 reps with independence.  Patient and family able to teachback stroke & positioning education independently.                  Reasons for Discontinuation of Therapy Services  Transfer to alternate level of care.      Plan:  Patient Discharged to: Outpatient Therapy Services.  Hanny Riggs PT, DPT  11/1/2017  Pager: [phone]

## 2017-11-10 ENCOUNTER — TELEPHONE (OUTPATIENT)
Dept: NEUROLOGY | Facility: CLINIC | Age: 52
End: 2017-11-10

## 2017-11-10 NOTE — TELEPHONE ENCOUNTER
----- Message from Heath Staples RN sent at 10/30/2017 11:55 AM CDT -----  Hey everyone,     I was wondering if you could schedule Mrs. Willams a follow up appointment in 4-6 weeks with a stroke provider.     Thanks so much for all you do for myself and our stroke patients,  JERSEY RamiresN-RN

## 2018-01-29 ENCOUNTER — TELEPHONE (OUTPATIENT)
Dept: ADMINISTRATIVE | Facility: OTHER | Age: 53
End: 2018-01-29

## 2019-01-09 LAB — POC BETA-HCG (QUAL): NEGATIVE

## 2019-04-10 ENCOUNTER — HISTORICAL (OUTPATIENT)
Dept: ADMINISTRATIVE | Facility: HOSPITAL | Age: 54
End: 2019-04-10

## 2019-04-10 LAB
APPEARANCE, UA: CLEAR
BACTERIA #/AREA URNS AUTO: ABNORMAL /[HPF]
BILIRUB SERPL-MCNC: NEGATIVE MG/DL
BILIRUB UR QL STRIP: NEGATIVE
BLOOD URINE, POC: NORMAL
CLARITY, POC UA: NORMAL
COLOR UR: ABNORMAL
COLOR, POC UA: YELLOW
GLUCOSE (UA): NORMAL
GLUCOSE UR QL STRIP: NEGATIVE
HGB UR QL STRIP: 0.1 MG/DL
HYALINE CASTS #/AREA URNS LPF: ABNORMAL /[LPF]
KETONES UR QL STRIP: NEGATIVE
KETONES UR QL STRIP: NEGATIVE
LEUKOCYTE EST, POC UA: NEGATIVE
LEUKOCYTE ESTERASE UR QL STRIP: NEGATIVE
NITRITE UR QL STRIP: ABNORMAL
NITRITE, POC UA: POSITIVE
PH UR STRIP: 7.5 [PH] (ref 4.5–8)
PH, POC UA: 7.5
PROT UR QL STRIP: NEGATIVE
PROTEIN, POC: NEGATIVE
RBC #/AREA URNS AUTO: ABNORMAL /[HPF]
SP GR UR STRIP: 1.01 (ref 1–1.03)
SPECIFIC GRAVITY, POC UA: 1
SQUAMOUS #/AREA URNS LPF: ABNORMAL /[LPF]
UROBILINOGEN UR STRIP-ACNC: NORMAL
UROBILINOGEN, POC UA: NORMAL
WBC #/AREA URNS AUTO: ABNORMAL /HPF

## 2021-08-19 LAB
PAP RECOMMENDATION EXT: NORMAL
PAP SMEAR: NORMAL

## 2021-09-14 ENCOUNTER — PATIENT OUTREACH (OUTPATIENT)
Dept: EMERGENCY MEDICINE | Facility: HOSPITAL | Age: 56
End: 2021-09-14

## 2021-10-14 ENCOUNTER — PATIENT OUTREACH (OUTPATIENT)
Dept: EMERGENCY MEDICINE | Facility: HOSPITAL | Age: 56
End: 2021-10-14

## 2021-11-05 ENCOUNTER — PATIENT OUTREACH (OUTPATIENT)
Dept: EMERGENCY MEDICINE | Facility: HOSPITAL | Age: 56
End: 2021-11-05

## 2021-12-07 ENCOUNTER — PATIENT OUTREACH (OUTPATIENT)
Dept: EMERGENCY MEDICINE | Facility: HOSPITAL | Age: 56
End: 2021-12-07

## 2022-01-04 ENCOUNTER — HISTORICAL (OUTPATIENT)
Dept: ADMINISTRATIVE | Facility: HOSPITAL | Age: 57
End: 2022-01-04

## 2022-03-13 ENCOUNTER — HISTORICAL (OUTPATIENT)
Dept: ADMINISTRATIVE | Facility: HOSPITAL | Age: 57
End: 2022-03-13

## 2022-03-15 ENCOUNTER — PATIENT OUTREACH (OUTPATIENT)
Dept: EMERGENCY MEDICINE | Facility: HOSPITAL | Age: 57
End: 2022-03-15

## 2022-04-10 ENCOUNTER — HISTORICAL (OUTPATIENT)
Dept: ADMINISTRATIVE | Facility: HOSPITAL | Age: 57
End: 2022-04-10
Payer: MEDICAID

## 2022-04-26 VITALS
WEIGHT: 147.69 LBS | HEIGHT: 62 IN | DIASTOLIC BLOOD PRESSURE: 92 MMHG | BODY MASS INDEX: 27.18 KG/M2 | SYSTOLIC BLOOD PRESSURE: 142 MMHG

## 2022-05-04 ENCOUNTER — OFFICE VISIT (OUTPATIENT)
Dept: FAMILY MEDICINE | Facility: CLINIC | Age: 57
End: 2022-05-04
Payer: MEDICARE

## 2022-05-04 ENCOUNTER — HOSPITAL ENCOUNTER (OUTPATIENT)
Dept: RADIOLOGY | Facility: HOSPITAL | Age: 57
Discharge: HOME | End: 2022-05-04
Attending: STUDENT IN AN ORGANIZED HEALTH CARE EDUCATION/TRAINING PROGRAM
Payer: MEDICARE

## 2022-05-04 VITALS
TEMPERATURE: 99 F | OXYGEN SATURATION: 99 % | SYSTOLIC BLOOD PRESSURE: 142 MMHG | DIASTOLIC BLOOD PRESSURE: 94 MMHG | RESPIRATION RATE: 20 BRPM | HEIGHT: 62 IN | WEIGHT: 143.31 LBS | BODY MASS INDEX: 26.37 KG/M2 | HEART RATE: 67 BPM

## 2022-05-04 DIAGNOSIS — I10 PRIMARY HYPERTENSION: ICD-10-CM

## 2022-05-04 DIAGNOSIS — R22.32 NODULE OF FINGER OF LEFT HAND: ICD-10-CM

## 2022-05-04 DIAGNOSIS — F17.200 SMOKER: ICD-10-CM

## 2022-05-04 DIAGNOSIS — R22.32 NODULE OF FINGER OF LEFT HAND: Primary | ICD-10-CM

## 2022-05-04 DIAGNOSIS — R79.89 LOW VITAMIN D LEVEL: ICD-10-CM

## 2022-05-04 DIAGNOSIS — I63.512 ACUTE ISCHEMIC LEFT MCA STROKE: ICD-10-CM

## 2022-05-04 DIAGNOSIS — F32.A DEPRESSIVE DISORDER: ICD-10-CM

## 2022-05-04 DIAGNOSIS — R76.8 ANA POSITIVE: Primary | ICD-10-CM

## 2022-05-04 DIAGNOSIS — G89.4 CHRONIC PAIN SYNDROME: ICD-10-CM

## 2022-05-04 PROBLEM — G40.909 SEIZURE DISORDER: Status: ACTIVE | Noted: 2022-05-04

## 2022-05-04 PROCEDURE — 1160F PR REVIEW ALL MEDS BY PRESCRIBER/CLIN PHARMACIST DOCUMENTED: ICD-10-PCS | Mod: CPTII,,, | Performed by: STUDENT IN AN ORGANIZED HEALTH CARE EDUCATION/TRAINING PROGRAM

## 2022-05-04 PROCEDURE — 3080F PR MOST RECENT DIASTOLIC BLOOD PRESSURE >= 90 MM HG: ICD-10-PCS | Mod: CPTII,,, | Performed by: STUDENT IN AN ORGANIZED HEALTH CARE EDUCATION/TRAINING PROGRAM

## 2022-05-04 PROCEDURE — 3080F DIAST BP >= 90 MM HG: CPT | Mod: CPTII,,, | Performed by: STUDENT IN AN ORGANIZED HEALTH CARE EDUCATION/TRAINING PROGRAM

## 2022-05-04 PROCEDURE — 99214 PR OFFICE/OUTPT VISIT, EST, LEVL IV, 30-39 MIN: ICD-10-PCS | Mod: S$PBB,,, | Performed by: STUDENT IN AN ORGANIZED HEALTH CARE EDUCATION/TRAINING PROGRAM

## 2022-05-04 PROCEDURE — 73130 X-RAY EXAM OF HAND: CPT | Mod: TC,PN,RT

## 2022-05-04 PROCEDURE — 99214 OFFICE O/P EST MOD 30 MIN: CPT | Mod: S$PBB,,, | Performed by: STUDENT IN AN ORGANIZED HEALTH CARE EDUCATION/TRAINING PROGRAM

## 2022-05-04 PROCEDURE — 1159F PR MEDICATION LIST DOCUMENTED IN MEDICAL RECORD: ICD-10-PCS | Mod: CPTII,,, | Performed by: STUDENT IN AN ORGANIZED HEALTH CARE EDUCATION/TRAINING PROGRAM

## 2022-05-04 PROCEDURE — 3077F SYST BP >= 140 MM HG: CPT | Mod: CPTII,,, | Performed by: STUDENT IN AN ORGANIZED HEALTH CARE EDUCATION/TRAINING PROGRAM

## 2022-05-04 PROCEDURE — 3008F BODY MASS INDEX DOCD: CPT | Mod: CPTII,,, | Performed by: STUDENT IN AN ORGANIZED HEALTH CARE EDUCATION/TRAINING PROGRAM

## 2022-05-04 PROCEDURE — 99214 OFFICE O/P EST MOD 30 MIN: CPT | Mod: PBBFAC,PN | Performed by: STUDENT IN AN ORGANIZED HEALTH CARE EDUCATION/TRAINING PROGRAM

## 2022-05-04 PROCEDURE — 1159F MED LIST DOCD IN RCRD: CPT | Mod: CPTII,,, | Performed by: STUDENT IN AN ORGANIZED HEALTH CARE EDUCATION/TRAINING PROGRAM

## 2022-05-04 PROCEDURE — 3008F PR BODY MASS INDEX (BMI) DOCUMENTED: ICD-10-PCS | Mod: CPTII,,, | Performed by: STUDENT IN AN ORGANIZED HEALTH CARE EDUCATION/TRAINING PROGRAM

## 2022-05-04 PROCEDURE — 3077F PR MOST RECENT SYSTOLIC BLOOD PRESSURE >= 140 MM HG: ICD-10-PCS | Mod: CPTII,,, | Performed by: STUDENT IN AN ORGANIZED HEALTH CARE EDUCATION/TRAINING PROGRAM

## 2022-05-04 PROCEDURE — 1160F RVW MEDS BY RX/DR IN RCRD: CPT | Mod: CPTII,,, | Performed by: STUDENT IN AN ORGANIZED HEALTH CARE EDUCATION/TRAINING PROGRAM

## 2022-05-04 RX ORDER — LEVETIRACETAM 500 MG/1
TABLET ORAL
COMMUNITY
Start: 2021-12-27 | End: 2022-07-12 | Stop reason: SDUPTHER

## 2022-05-04 RX ORDER — MEDROXYPROGESTERONE ACETATE 10 MG/1
10 TABLET ORAL DAILY
COMMUNITY
Start: 2022-02-15 | End: 2022-07-12 | Stop reason: SDUPTHER

## 2022-05-04 RX ORDER — LINACLOTIDE 290 UG/1
CAPSULE, GELATIN COATED ORAL
COMMUNITY
Start: 2021-12-27 | End: 2022-12-13

## 2022-05-04 RX ORDER — TRAZODONE HYDROCHLORIDE 100 MG/1
TABLET ORAL
COMMUNITY
Start: 2021-12-27 | End: 2022-11-02

## 2022-05-04 RX ORDER — ERGOCALCIFEROL 1.25 MG/1
50000 CAPSULE ORAL
Qty: 12 CAPSULE | Refills: 0 | Status: SHIPPED | OUTPATIENT
Start: 2022-05-04 | End: 2022-07-21

## 2022-05-04 RX ORDER — METHOCARBAMOL 750 MG/1
TABLET, FILM COATED ORAL
COMMUNITY
Start: 2022-05-01 | End: 2023-09-28

## 2022-05-04 RX ORDER — CITALOPRAM 20 MG/1
20 TABLET, FILM COATED ORAL DAILY
COMMUNITY
Start: 2022-03-03 | End: 2022-07-12 | Stop reason: SDUPTHER

## 2022-05-04 RX ORDER — PANTOPRAZOLE SODIUM 40 MG/1
40 TABLET, DELAYED RELEASE ORAL DAILY
COMMUNITY
Start: 2021-09-13 | End: 2023-02-16 | Stop reason: SDUPTHER

## 2022-05-04 RX ORDER — PREDNISONE 20 MG/1
20 TABLET ORAL 2 TIMES DAILY
Qty: 10 TABLET | Refills: 0 | Status: SHIPPED | OUTPATIENT
Start: 2022-05-04 | End: 2022-11-02

## 2022-05-04 RX ORDER — CLOPIDOGREL BISULFATE 75 MG/1
75 TABLET ORAL DAILY
COMMUNITY
Start: 2021-12-27 | End: 2023-02-16 | Stop reason: SDUPTHER

## 2022-05-04 NOTE — PROGRESS NOTES
Subjective:       Patient ID: Chloé Willams is a 56 y.o. female.    Chief Complaint: Establish Care and Back Pain (Lower  back , Saturday attended [facility] for a visit and injection was given along with medication. F/U with MD.)    HPI     +DINESH/CeNp  Continues with right hand nodules and pain   Will refer to rheum once available  Did have ER visit for back and leg pain. Robaxin is not helping per patient.       Depression/Anxiety   Last visit   Celexa was stopped and Zoloft 50 mg was started.  Patient reports that this is doing helping and would like to continue on dose at this time.  Diazepam 2 mg TID    Seizures  Takes Keppra 500 mg BID    Ear issues  Has been referred to ENT    HTN  Amlodipine 10 mg    Insomnia  Trazodone 100 mg    GERD/Constipation  Linzess 290 mcg  Protonix 40 mg     HLD/CVA  Atorvastatin 40 mg  Plavix 75 mg     Review of Systems   Constitutional: Negative for chills, fever and unexpected weight change.   HENT: Negative for nasal congestion.    Respiratory: Negative for cough, shortness of breath and wheezing.    Cardiovascular: Negative for chest pain and leg swelling.   Genitourinary: Negative for dysuria.   Integumentary:  Negative for rash and wound.   Neurological: Negative for dizziness and syncope.   Psychiatric/Behavioral: Negative.          Objective:      Physical Exam  Constitutional:       General: She is not in acute distress.  Eyes:      General: No scleral icterus.     Extraocular Movements: Extraocular movements intact.      Conjunctiva/sclera: Conjunctivae normal.      Pupils: Pupils are equal, round, and reactive to light.   Cardiovascular:      Rate and Rhythm: Normal rate and regular rhythm.      Heart sounds: No murmur heard.  Pulmonary:      Effort: Pulmonary effort is normal. No respiratory distress.      Breath sounds: No stridor. No wheezing or rhonchi.   Abdominal:      General: There is no distension.   Musculoskeletal:         General: No swelling. Normal range of  motion.   Skin:     General: Skin is warm and dry.      Coloration: Skin is not jaundiced.      Findings: No rash.   Neurological:      Mental Status: She is alert.      Gait: Gait normal.   Psychiatric:         Thought Content: Thought content normal.         Assessment:       Problem List Items Addressed This Visit    None     Visit Diagnoses     Nodule of finger of left hand    -  Primary    Smoker        Relevant Orders    Ambulatory referral/consult to Smoking Cessation Program          Plan:       Problem List Items Addressed This Visit        Neuro    Chronic pain syndrome    Overview     Likely related to positive DINESH CENP.   Referring to rheum. Discussed with patient that currently no rheum services in [place]  Xray right hand  Prednisone 20 mg BID x 5 days            Relevant Medications    predniSONE (DELTASONE) 20 MG tablet    Acute ischemic left MCA stroke    Overview     Continue Plavix  Advised smoking cessation.    Referred to Smoking cessation              Psychiatric    Depressive disorder    Overview     Continue Zoloft 50 mg daily  Will continue diazepam at this time as do no want to prompt another  Seizure.  May consider tapering in the future.               Cardiac/Vascular    HTN (hypertension)    Overview     Continue amlodipine 10 mg daily                Other    Low vitamin D level    Overview     Vit D 3 67977 units q week x 12 weeks           Relevant Medications    ergocalciferol (ERGOCALCIFEROL) 50,000 unit Cap      Other Visit Diagnoses     Nodule of finger of left hand    -  Primary    Relevant Medications    predniSONE (DELTASONE) 20 MG tablet    Smoker        Relevant Orders    Ambulatory referral/consult to Smoking Cessation Program

## 2022-05-16 ENCOUNTER — PATIENT OUTREACH (OUTPATIENT)
Dept: EMERGENCY MEDICINE | Facility: HOSPITAL | Age: 57
End: 2022-05-16
Payer: MEDICAID

## 2022-05-30 ENCOUNTER — TELEPHONE (OUTPATIENT)
Dept: EMERGENCY MEDICINE | Facility: HOSPITAL | Age: 57
End: 2022-05-30
Payer: MEDICAID

## 2022-05-30 ENCOUNTER — PATIENT OUTREACH (OUTPATIENT)
Dept: EMERGENCY MEDICINE | Facility: HOSPITAL | Age: 57
End: 2022-05-30
Payer: MEDICAID

## 2022-05-30 NOTE — TELEPHONE ENCOUNTER
Per Dr. Berry' note 5/4/2022, she indicates that a referral was sent to ENT.  There is no indication in the record that this referral has been sent.  Would you confirm if the ENT referral was sent and to whom.    Per Wendi at [facility], the referral sent 4/20/22 was denied because the clinic is not in network with the patient's insurance.  She sugted [facility].  Please send referral.    Thank you,    Jahaira Kaba LPN Care Coordinator  [phone]

## 2022-06-01 DIAGNOSIS — R56.9 SEIZURE: ICD-10-CM

## 2022-06-01 DIAGNOSIS — H92.09 OTALGIA, UNSPECIFIED LATERALITY: Primary | ICD-10-CM

## 2022-06-03 ENCOUNTER — PATIENT OUTREACH (OUTPATIENT)
Dept: EMERGENCY MEDICINE | Facility: HOSPITAL | Age: 57
End: 2022-06-03
Payer: MEDICAID

## 2022-06-09 ENCOUNTER — PATIENT OUTREACH (OUTPATIENT)
Dept: EMERGENCY MEDICINE | Facility: HOSPITAL | Age: 57
End: 2022-06-09
Payer: MEDICAID

## 2022-06-09 NOTE — PROGRESS NOTES
6/9/22 1003 Phoned patient to follow up on ENT, neurology and rheumatology referral.  Left voicemail requesting return call.  6/13/22 0917  Phoned patient to follow up on ENT, neurology and rheumatology referral.  Left voicemail requesting return call.    6/17/22 0841 Left voicemail to remind patient of appointment with Dr. Diana Berry 6/21/22 1100 with instructions to contact Medicaid today if she needs transportation.

## 2022-07-12 ENCOUNTER — OFFICE VISIT (OUTPATIENT)
Dept: FAMILY MEDICINE | Facility: CLINIC | Age: 57
End: 2022-07-12
Payer: MEDICARE

## 2022-07-12 VITALS
HEART RATE: 77 BPM | RESPIRATION RATE: 20 BRPM | WEIGHT: 142.31 LBS | DIASTOLIC BLOOD PRESSURE: 87 MMHG | OXYGEN SATURATION: 20 % | BODY MASS INDEX: 26.19 KG/M2 | SYSTOLIC BLOOD PRESSURE: 122 MMHG | TEMPERATURE: 99 F | HEIGHT: 62 IN

## 2022-07-12 DIAGNOSIS — F32.A DEPRESSION, UNSPECIFIED DEPRESSION TYPE: ICD-10-CM

## 2022-07-12 DIAGNOSIS — B07.0 PLANTAR WART OF RIGHT FOOT: Primary | ICD-10-CM

## 2022-07-12 DIAGNOSIS — R76.8 ANA POSITIVE: ICD-10-CM

## 2022-07-12 DIAGNOSIS — G40.909 SEIZURE DISORDER: ICD-10-CM

## 2022-07-12 DIAGNOSIS — H60.333 CHRONIC SWIMMER'S EAR OF BOTH SIDES: ICD-10-CM

## 2022-07-12 DIAGNOSIS — E78.5 HYPERLIPIDEMIA, UNSPECIFIED HYPERLIPIDEMIA TYPE: ICD-10-CM

## 2022-07-12 DIAGNOSIS — N93.9 ABNORMAL UTERINE BLEEDING: ICD-10-CM

## 2022-07-12 DIAGNOSIS — M54.50 CHRONIC RIGHT-SIDED LOW BACK PAIN WITHOUT SCIATICA: ICD-10-CM

## 2022-07-12 DIAGNOSIS — G89.29 CHRONIC RIGHT-SIDED LOW BACK PAIN WITHOUT SCIATICA: ICD-10-CM

## 2022-07-12 PROCEDURE — 99214 OFFICE O/P EST MOD 30 MIN: CPT | Mod: S$PBB,,, | Performed by: STUDENT IN AN ORGANIZED HEALTH CARE EDUCATION/TRAINING PROGRAM

## 2022-07-12 PROCEDURE — 3008F BODY MASS INDEX DOCD: CPT | Mod: CPTII,,, | Performed by: STUDENT IN AN ORGANIZED HEALTH CARE EDUCATION/TRAINING PROGRAM

## 2022-07-12 PROCEDURE — 1159F MED LIST DOCD IN RCRD: CPT | Mod: CPTII,,, | Performed by: STUDENT IN AN ORGANIZED HEALTH CARE EDUCATION/TRAINING PROGRAM

## 2022-07-12 PROCEDURE — 3008F PR BODY MASS INDEX (BMI) DOCUMENTED: ICD-10-PCS | Mod: CPTII,,, | Performed by: STUDENT IN AN ORGANIZED HEALTH CARE EDUCATION/TRAINING PROGRAM

## 2022-07-12 PROCEDURE — 3074F SYST BP LT 130 MM HG: CPT | Mod: CPTII,,, | Performed by: STUDENT IN AN ORGANIZED HEALTH CARE EDUCATION/TRAINING PROGRAM

## 2022-07-12 PROCEDURE — 99214 PR OFFICE/OUTPT VISIT, EST, LEVL IV, 30-39 MIN: ICD-10-PCS | Mod: S$PBB,,, | Performed by: STUDENT IN AN ORGANIZED HEALTH CARE EDUCATION/TRAINING PROGRAM

## 2022-07-12 PROCEDURE — 99215 OFFICE O/P EST HI 40 MIN: CPT | Mod: PBBFAC,PN | Performed by: STUDENT IN AN ORGANIZED HEALTH CARE EDUCATION/TRAINING PROGRAM

## 2022-07-12 PROCEDURE — 3079F PR MOST RECENT DIASTOLIC BLOOD PRESSURE 80-89 MM HG: ICD-10-PCS | Mod: CPTII,,, | Performed by: STUDENT IN AN ORGANIZED HEALTH CARE EDUCATION/TRAINING PROGRAM

## 2022-07-12 PROCEDURE — 3079F DIAST BP 80-89 MM HG: CPT | Mod: CPTII,,, | Performed by: STUDENT IN AN ORGANIZED HEALTH CARE EDUCATION/TRAINING PROGRAM

## 2022-07-12 PROCEDURE — 3074F PR MOST RECENT SYSTOLIC BLOOD PRESSURE < 130 MM HG: ICD-10-PCS | Mod: CPTII,,, | Performed by: STUDENT IN AN ORGANIZED HEALTH CARE EDUCATION/TRAINING PROGRAM

## 2022-07-12 PROCEDURE — 1159F PR MEDICATION LIST DOCUMENTED IN MEDICAL RECORD: ICD-10-PCS | Mod: CPTII,,, | Performed by: STUDENT IN AN ORGANIZED HEALTH CARE EDUCATION/TRAINING PROGRAM

## 2022-07-12 RX ORDER — ATORVASTATIN CALCIUM 40 MG/1
40 TABLET, FILM COATED ORAL DAILY
Qty: 90 TABLET | Refills: 3 | Status: SHIPPED | OUTPATIENT
Start: 2022-07-12 | End: 2023-07-12

## 2022-07-12 RX ORDER — CITALOPRAM 20 MG/1
20 TABLET, FILM COATED ORAL DAILY
Qty: 30 TABLET | Refills: 6 | Status: SHIPPED | OUTPATIENT
Start: 2022-07-12 | End: 2023-02-16 | Stop reason: SDUPTHER

## 2022-07-12 RX ORDER — LEVETIRACETAM 500 MG/1
500 TABLET ORAL 2 TIMES DAILY
Qty: 60 TABLET | Refills: 6 | Status: SHIPPED | OUTPATIENT
Start: 2022-07-12 | End: 2022-11-02

## 2022-07-12 RX ORDER — MEDROXYPROGESTERONE ACETATE 10 MG/1
10 TABLET ORAL DAILY
Qty: 30 TABLET | Refills: 0 | Status: SHIPPED | OUTPATIENT
Start: 2022-07-12 | End: 2023-02-16

## 2022-07-12 RX ORDER — CIPROFLOXACIN AND DEXAMETHASONE 3; 1 MG/ML; MG/ML
4 SUSPENSION/ DROPS AURICULAR (OTIC) 2 TIMES DAILY
Qty: 7.5 ML | Refills: 0 | Status: SHIPPED | OUTPATIENT
Start: 2022-07-12 | End: 2022-12-13

## 2022-07-12 NOTE — PROGRESS NOTES
Subjective:       Patient ID: Chloé Willams is a 57 y.o. female.    Chief Complaint: Follow-up (Reports swelling on rt.thumb, medication refills, back pain 8/10 sharp and dull pain, skin break under rt foot. Left ear throbbing 6/10 (Never receive a call from ENT). )    HPI   Interval history  Patient reports not being called for any of her referrals ENT, neuro, Rheum but showed patient communication where she was left messages on three occasions. Patient admits to receiving these messages now but states she doesn't answer her phone secondary to getting SPAM calls.   Also reports that she is having issues with her back hurting.  Also reports being out of her HTN meds and some other meds. Did not call for refill. Pharmacy reportedly told patient to call doctor and patient did not .     Back Pain  8/10 Dull and aching and sharp at times.   Has not had PT    DINESH/CeNp  Continues with right hand nodules and pain   Will refer to rheum once available  Did have ER visit for back and leg pain. Robaxin is not helping per patient.   Has been referred to rheum as above. Also explained to patient that rheum referrals are limited secondary to provider issues        Foot pain  Points to spot on plantar surface of right foot with complaint that this area hurts    Depression/Anxiety   Celexa was stopped and Zoloft 50 mg was started.  Patient reports that this is doing helping and would like to continue on dose at this time.  Diazepam 2 mg TID     Seizures  Takes Keppra 500 mg BID  Has been referred to neurology     Ear issues  Has been referred to ENT  States that left ear feels like it is draining and her right ear is throbbing     HTN  Amlodipine 10 mg     Insomnia  Trazodone 100 mg     GERD/Constipation  Linzess 290 mcg  Protonix 40 mg      HLD/CVA  Atorvastatin 40 mg  Plavix 75 mg   Review of Systems   Constitutional: Negative for chills, fever and unexpected weight change.   HENT: Negative for nasal congestion.     Respiratory: Negative for cough, shortness of breath and wheezing.    Cardiovascular: Negative for chest pain and leg swelling.   Genitourinary: Negative for dysuria.   Musculoskeletal: Positive for arthralgias, back pain and joint swelling.   Integumentary:  Negative for rash and wound.   Neurological: Negative for dizziness and syncope.   Psychiatric/Behavioral: Negative.          Objective:      Physical Exam  Constitutional:       General: She is not in acute distress.  Eyes:      General: No scleral icterus.     Extraocular Movements: Extraocular movements intact.      Conjunctiva/sclera: Conjunctivae normal.      Pupils: Pupils are equal, round, and reactive to light.   Cardiovascular:      Rate and Rhythm: Normal rate and regular rhythm.      Heart sounds: No murmur heard.  Pulmonary:      Effort: Pulmonary effort is normal. No respiratory distress.      Breath sounds: No stridor. No wheezing or rhonchi.   Abdominal:      General: Bowel sounds are normal. There is no distension.      Palpations: Abdomen is soft.      Tenderness: There is no abdominal tenderness. There is no guarding.   Musculoskeletal:         General: No deformity. Normal range of motion.      Comments: Nodule noted to right hand  Foot with plantar wart on right  No CVA tenderness    Skin:     General: Skin is warm and dry.      Coloration: Skin is not jaundiced.      Findings: No rash.   Neurological:      Mental Status: She is alert.      Gait: Gait normal.   Psychiatric:         Thought Content: Thought content normal.         Assessment:       Problem List Items Addressed This Visit    None         Plan:       Problem List Items Addressed This Visit        Neuro    Seizure disorder    Overview     Have referred to neurology.  Continue Keppra 500 mg BID               ENT    Chronic swimmer's ear of both sides    Overview     Have referred patient to ENT  Ciprodex prescribed for patient                 Derm    Plantar wart of right foot  - Primary    Overview     Referring to Dr. Wing podiatry           Relevant Orders    Ambulatory referral/consult to Podiatry       Immunology/Multi System    DINESH positive    Overview     Referred to rheumatology. Also discussed with patient need to answer phone/return calls when called regarding referrals.               Orthopedic    Right-sided low back pain without sciatica    Overview     Referral to PT.  Advised patient to answer phone.             Relevant Orders    Ambulatory referral/consult to Physical/Occupational Therapy      Follow up in about 3 months (around 10/12/2022) for Hypertension.

## 2022-07-22 ENCOUNTER — PATIENT OUTREACH (OUTPATIENT)
Dept: ADMINISTRATIVE | Facility: HOSPITAL | Age: 57
End: 2022-07-22
Payer: MEDICAID

## 2022-07-22 NOTE — PROGRESS NOTES
Population Health. Out Reach.  The following record(s)  below were uploaded for Health Maintenance .          8/19/21 PAP SMEAR

## 2022-07-27 NOTE — PROGRESS NOTES
"Original encounter date  10/14/2021 in [employer] EMR.  Information placed in Epic for continiuity purposes.  First follow-up                 HealthE Care Entered On:  10/14/2021 15:44 CDT    Performed On:  10/14/2021 15:38 CDT by Jahaira Kaba               Discharge Past 30 Days   Visit to the Hospital in the Last 30 Days :   None   Education Provided :   ED utilization, Importance of keeping appointments, Medication Compliance, Diet Compliance   Discharge Past 30 Days Addntl Comments :   Pt. denies SI/HI or seizures.  Pt. states she had a seizure last month and her seizure medication was increased to TID.  BP "good" pt. not home to provide results.  BM 10/11/2021.  Pt. states she has an appt. with a dermatologist next month.  Stressed the importance of medication and low salt diet compliance, keeping all appts. and reinforced the use of urgent care clinic for non emergent issues when PCP unavailable.  Pt. verbalized understanding to all instructions.     Jahaira Kaba - 10/14/2021 15:38 CDT   Barriers to Care   SDoH Eat Less Than You Should :   No   SDoH Shut Off Services to Your Home :   No   SDoH No Regular Place to Live :   No   SDoH Needed Provider but Costs too Much :   No   SDoH Help Reading and Writing Paper Work :   No   SDoH Feel Lonely Often :   No   SDoH Missed Appt/Meds- No Transportation :   Yes   SDoH Call Dr To Be Seen Right Away :   No   SDoH Medical Problems Cause ED Visit :   Yes   SDoH Other Problems Affecting Health :   No   SDoH Reviewed :   Yes   SDoH Dentist Seen in Last Year :   Yes   Jahaira Kaba - 10/14/2021 15:38 CDT   Appointments   PCP Visit Upcoming :   No   PCP Visit Within Year :   Yes   PCP Visit Upcoming Reason :   Regular visit   PCP Visit One Year Date :   9/21/2021 CDT   Follow-Up Specialist Appt Scheduled :   Yes   Type of Specialist :   Dermatologist 11/2021 (pt. unsure of date)   Follow-Up Lab Appt Scheduled :   No   Follow-Up Radiology Appt Scheduled :   No   Sendy" Jahaira CERON - 10/14/2021 15:38 CDT   Navigation   Initial Assesment Completed By :   Phone   ED FIN :   [id number]   MCIP Navigation Call Log :   First follow up call   [facility]   Transportation Arrangements Made :   Yes   Providers Patient Visited Last Year :   Augustus Galdamez III, FNP  [facility]   Root Causes for High-ED Utilization :   Chronic conditions, Lack of access to care, Lack of transportation   Plan: :   Educated on appropriate ED utilization, Educated on alternate means of health care; ie urgent care when PCP not available, Educated on importance of follow up care with PCP, Other: Medication and low salt diet compliance   Participation in Activity Designed to Address Lack of Annual Ambulatory or Preventative Care Visit? :   Yes   Participation in Activity Designed to Address Avoidable ED Utilization? :   Yes   During Current Measurement Year, Did Enrollee Receive Education Regarding Outpatient Primary Care Options? :   Yes   During Current Measurement Year, Did Enrollee Receive an Appt Reminder 24-48 Hours Before a Scheduled Appt? :   Yes   During Current Measurement Year, Did the Network Provider Schedule and Appt or Provide a Referral to Enrollee? :   Yes   Education Provided :   Jahaira De La Fuente - 10/14/2021 15:38 CDT

## 2022-07-27 NOTE — PROGRESS NOTES
Original encounter date 9/14/2021 in [employer] EMR.  Information placed in Epic for continiuity purposes.  Initial Assessment                 HealthE Care Entered On:  9/14/2021 8:38 CDT    Performed On:  9/14/2021 8:29 CDT by Jahaira Kaba               Discharge Past 30 Days   Visit to the Hospital in the Last 30 Days :   Emergency department (ED) visit   Reason for Choosing ED for Care :   Convenience   Perception of Change in Health Status DC :   Improving   Discharged To :   Home independently   DC Instructions :   Received discharge instructions , Understands discharge instructions    Education Provided :   ED utilization, Importance of keeping appointments, Medication Compliance, Diet Compliance   Discharge Past 30 Days Addntl Comments :   Pt. states the pain in her finger has improved.  BP, did not check.  Instructed to check daily, record and bring to PCP appts.  BM 9/13/2021, pt. denies SI/HI and seizures.  Pt. states she has not had a COVID vaccine.  Instructed to wear mask and practice good handwashing.  Stressed the importance of medication and low salt diet compliance, keeping appts. and instructed to utilize urgent care clinic for non emergent issues when PCP unavailable.  Pt. verbalized understanding to all instructions.     Jahaira Kaba - 9/14/2021 8:29 CDT   Barriers to Care   SDoH Eat Less Than You Should :   No   SDoH Shut Off Services to Your Home :   No   SDoH No Regular Place to Live :   No   SDoH Needed Provider but Costs too Much :   No   SDoH Help Reading and Writing Paper Work :   No   SDoH Feel Lonely Often :   No   SDoH Missed Appt/Meds- No Transportation :   Yes   (Comment: Once [Jahaira Kaba - 9/14/2021 8:29 CDT] )   SDoH Call Dr To Be Seen Right Away :   No   (Comment: Sometimes [Jahaira Kaba - 9/14/2021 8:29 CDT] )   SDoH Medical Problems Cause ED Visit :   Yes   SDoH Other Problems Affecting Health :   No   SDoH Reviewed :   Yes   SDoH Dentist Seen in Last Year :   Yes    Jahaira Kaba - 9/14/2021 8:29 CDT   Prescriptions   Medication Reconcilation Completed :   Unknown   Medication Prescriptions Filled After DC :   Confirms all medications filled , Confirms taking medications as prescribed    Questions About Meds Prescribed at DC :   Denies any questions or concerns                    Jahaira Kaba - 9/14/2021 8:29 CDT   Appointments   Follow-Up Appt Scheduled :   Yes   Follow-Up Provider Appt Scheduled By :   Patient   PCP Name :   JORGE Logan III   Follow-Up Date :   9/21/2021 CDT   Follow-Up Appointment Status :   Confirms scheduled appointment    PCP Visit Upcoming :   Yes   Appointment Date/Time :   9/21/2021 CDT   PCP Visit Upcoming Reason :   Sick   PCP Visit Within Year :   Yes   PCP Visit Upcoming Reason :   Regular visit   PCP Visit One Year Date :   8/31/2021 CDT   Follow-Up Specialist Appt Scheduled :   No   Follow-Up Lab Appt Scheduled :   No   Follow-Up Radiology Appt Scheduled :   No   Jahaira Kaba - 9/14/2021 8:29 CDT   Navigation   Initial Assesment Completed By :   Phone   ED FIN :   [id number]   MCIP Navigation Call Log :   Initial MCIP contact   Referral To HE Care :   MCIP Navigation   Transportation Arrangements Made :   Yes   Providers Patient Visited Last Year :   GILBERT Logan IIIP  [facility]   Root Causes for High-ED Utilization :   Chronic conditions, Lack of access to care, Lack of transportation   Plan: :   Educated on appropriate ED utilization, Educated on alternate means of health care; ie urgent care when PCP not available, Educated on importance of follow up care with PCP, Educated on importance of follow up preventative dental care with dentist, Other: Medication and low salt diet compliance   Participation in Activity Designed to Address Lack of Annual Ambulatory or Preventative Care Visit? :   Yes   Participation in Activity Designed to Address Avoidable ED Utilization? :   Yes   During Current Measurement Year,  Did Enrollee Receive Education Regarding Outpatient Primary Care Options? :   Yes   During Current Measurement Year, Did Enrollee Receive an Appt Reminder 24-48 Hours Before a Scheduled Appt? :   Yes   During Current Measurement Year, Did the Network Provider Schedule and Appt or Provide a Referral to Enrollee? :   Yes   Education Provided :   Jahaira De La Fuente - 9/14/2021 8:29 CDT

## 2022-07-28 NOTE — PROGRESS NOTES
Original encounter date 3/15/2022 in [employer] EMR.  Information placed in Epic for continiuity purposes.  Additional follow-up.  Last patient contact in [employer] EMR.                 HealthE Care Entered On:  3/15/2022 10:19 CDT    Performed On:  3/15/2022 10:11 CDT by Jahaira Kaba               Discharge Past 30 Days   Visit to the Hospital in the Last 30 Days :   Emergency department (ED) visit   Reason for Choosing ED for Care :   Believes the problem too serious for doctor office or clinic   Perception of Change in Health Status DC :   Improving   Discharged To :   Home independently   DC Instructions :   Received discharge instructions , Understands discharge instructions    Education Provided :   ED utilization, Importance of keeping appointments, Medication Compliance, Diet Compliance   Discharge Past 30 Days Addntl Comments :   Pt. denies any seizures since ED visit and she also denies SI/HI.  BP, did not check.  Instructed to check and call back with results.  Last ETOH use 3/13/2022.  BM 3/14/2022.  Pt. verbalized understanding to all instructions.     Patient pregnant :   No   Jahaira Kaba - 3/15/2022 10:11 CDT   Barriers to Care   SDoH Eat Less Than You Should :   No   SDoH Shut Off Services to Your Home :   No   SDoH No Regular Place to Live :   No   SDoH Needed Provider but Costs too Much :   No   SDoH Help Reading and Writing Paper Work :   No   SDoH Feel Lonely Often :   No   SDoH Missed Appt/Meds- No Transportation :   Yes   SDoH Call Dr To Be Seen Right Away :   No   SDoH Medical Problems Cause ED Visit :   Yes   SDoH Other Problems Affecting Health :   No   SDoH Reviewed :   Yes   SDoH Dentist Seen in Last Year :   Yes   Jahaira Kaba - 3/15/2022 10:11 CDT   Prescriptions   Medication Reconcilation Completed :   Unknown   Medication Prescriptions Filled After DC :   No medications prescribed at discharge   Jahaira Kaba - 3/15/2022 10:11 CDT   Appointments   Follow-Up Appt Scheduled :    No   PCP Visit Upcoming :   No   PCP Visit Within Year :   Yes   PCP Visit Upcoming Reason :   Regular visit   Follow-Up Specialist Appt Scheduled :   Yes   Type of Specialist :   Dermatologist   Follow-Up Lab Appt Scheduled :   No   Follow-Up Date :   3/28/2022 CDT   Follow-Up Radiology Appt Scheduled :   No   Jahaira Kaba - 3/15/2022 10:11 CDT   Navigation   Initial Assesment Completed By :   Phone   ED FIN :   [id number]   MCIP Navigation Call Log :   Subsequent call   Referral To HE Care :   MCIP Navigation   Transportation Arrangements Made :   Yes   Providers Patient Visited Last Year :   Augustus Schmidt III, FNP  [facility]  Dr. Matteo Mcghee   Root Causes for High-ED Utilization :   Chronic conditions, Lack of access to care, Lack of transportation   Plan: :   Educated on appropriate ED utilization, Educated on alternate means of health care; ie urgent care when PCP not available, Educated on importance of follow up care with PCP, Other: Medication and low salt diet compliance   Participation in Activity Designed to Address Lack of Annual Ambulatory or Preventative Care Visit? :   Yes   Participation in Activity Designed to Address Avoidable ED Utilization? :   Yes   During Current Measurement Year, Did Enrollee Receive Education Regarding Outpatient Primary Care Options? :   Yes   During Current Measurement Year, Did Enrollee Receive an Appt Reminder 24-48 Hours Before a Scheduled Appt? :   Yes   During Current Measurement Year, Did the Network Provider Schedule and Appt or Provide a Referral to Enrollee? :   Yes   Education Provided :   Verbal   Jahaira Kaba - 3/15/2022 10:11 CDT   Durable Medical Equipment   Do You Have Current DME at Home? :   Yes   Anticipated Home Equipment :   Blood pressure monitor   DME Frequency of Use :   Daily   DME Duration of Use :   Life time   DME Supplies: :   Has all necessary supplies   Jahaira Kaba - 3/15/2022 10:11 CDT

## 2022-07-28 NOTE — PROGRESS NOTES
"Original encounter date 11/5/2021 in [employer] EMR.  Information placed in Epic for continiuity purposes.  Second follow-up                 HealthE Care Entered On:  11/5/2021 9:55 CDT    Performed On:  11/5/2021 9:48 CDT by Jahaira Kaba               Discharge Past 30 Days   Visit to the Hospital in the Last 30 Days :   Emergency department (ED) visit   Reason for Choosing ED for Care :   Convenience   Perception of Change in Health Status DC :   No change   Discharged To :   Home independently   DC Instructions :   Received discharge instructions , Understands discharge instructions    Education Provided :   ED utilization, Importance of keeping appointments, Medication Compliance, Diet Compliance   Discharge Past 30 Days Addntl Comments :   Pt. states right middle finger is still swollen and won't straighten out.  She states she will call PCP to ask for a referral to "a specialist."  /86, BM 11/4/2021, Pt. denies seizures, SI/HI.  Stressed the importance of medication and low salt diet compliance, keeping appts. and reinforced the use of urgent care clinic for non emergent issues when PCP unavailable.  Pt. verbalized understanding to all instructions.      Jahaira Kaba - 11/5/2021 9:48 CDT   Barriers to Care   SDoH Eat Less Than You Should :   No   SDoH Shut Off Services to Your Home :   No   SDoH No Regular Place to Live :   No   SDoH Needed Provider but Costs too Much :   No   SDoH Help Reading and Writing Paper Work :   No   SDoH Feel Lonely Often :   No   SDoH Missed Appt/Meds- No Transportation :   Yes   SDoH Call Dr To Be Seen Right Away :   No   SDoH Medical Problems Cause ED Visit :   Yes   SDoH Other Problems Affecting Health :   No   SDoH Reviewed :   Yes   SDoH Dentist Seen in Last Year :   Yes   Jahaira Kaba - 11/5/2021 9:48 CDT   Appointments   PCP Visit Upcoming :   No   PCP Visit Within Year :   Yes   PCP Visit Upcoming Reason :   Regular visit   PCP Visit One Year Date :   9/21/2021 " CDT   Follow-Up Specialist Appt Scheduled :   Yes   Type of Specialist :   Dermatologist   Follow-Up Lab Appt Scheduled :   No   Follow-Up Date :   11/8/2021 CST   Follow-Up Radiology Appt Scheduled :   No   Jahaira Kaba - 11/5/2021 9:48 CDT   Navigation   Initial Assesment Completed By :   Phone   ED FIN :   [id number]   MCIP Navigation Call Log :   Second follow up call   Referral To HE Care :   MCIP Navigation   Providers Patient Visited Last Year :   Augustus Schmidt III, FNP  [facility]   Root Causes for High-ED Utilization :   Chronic conditions, Lack of access to care, Lack of transportation   Plan: :   Educated on appropriate ED utilization, Educated on alternate means of health care; ie urgent care when PCP not available, Educated on importance of follow up care with PCP, Other: Medication and low salt diet compliance   Participation in Activity Designed to Address Lack of Annual Ambulatory or Preventative Care Visit? :   Yes   Participation in Activity Designed to Address Avoidable ED Utilization? :   Yes   During Current Measurement Year, Did Enrollee Receive Education Regarding Outpatient Primary Care Options? :   Yes   During Current Measurement Year, Did Enrollee Receive an Appt Reminder 24-48 Hours Before a Scheduled Appt? :   Yes   During Current Measurement Year, Did the Network Provider Schedule and Appt or Provide a Referral to Enrollee? :   Yes   Education Provided :   Verbal   Jahaira Kaba - 11/5/2021 9:48 CDT   Durable Medical Equipment   Do You Have Current DME at Home? :   Yes   Anticipated Home Equipment :   Blood pressure monitor   DME Frequency of Use :   Daily   DME Duration of Use :   Life time   DME Supplies: :   Has all necessary supplies   Jahaira Kaba - 11/5/2021 9:48 CDT

## 2022-07-28 NOTE — PROGRESS NOTES
Identity verified.  Pt denies SI/HI or seizures.  /91, BM 5/30/22, ETOH use 5/29/22.  Pt states the Zoloft seems to be helping her anxiety.  She states she had a dermatology visit 3/28/22 and a PCP visit 4/20/22 and 5/4/22.  PCP made referral to [facility] 4/20/2022.  Pt states she has not gotten a call with an appt.  Will follow up.  Per PCP note dated 5/4/22 a referral was made to ENT.  Pt states she has not gotten a call with an appt.  Will send message to PCP office to follow up.  PCP sent referral to rheumatology.  Pt waiting on appt.  Stressed the importance of medication and low salt diet compliance, keeping appointments and reinforced the use of urgent care clinic for non emergent issues when PCP unavailable.  Patient verbalized understanding to all instructions.   Sent message to PCP inquiring about ENT referral.  [phone] Spoke with Wendi at [facility] and she indicates the referral was declined because the patient's insurance is not accepted.  Message sent to PCP.

## 2022-07-28 NOTE — PROGRESS NOTES
Original encounter date 12/7/2021 in [employer] EMR.  Information placed in Epic for continiuity purposes.  Third follow-up                 HealthE Care Entered On:  12/7/2021 9:57 CST    Performed On:  12/7/2021 9:50 CST by Jahaira Kaba               Discharge Past 30 Days   Visit to the Hospital in the Last 30 Days :   None   Education Provided :   ED utilization, Importance of keeping appointments, Medication Compliance, Diet Compliance   Discharge Past 30 Days Addntl Comments :   Pt. states her finger is doing better with the splint.  Pt. states she had a visit with PCP 12/2/2021 and dermatology 11/8/2021.  Pt. states she has a dermatology appt. in 1/2022 to laser moles off of her face.  BP, did not check.  Instructed to check and return call with results.  Pt. denies seizures, SI/HI.  BM 12/6/2021.  Stressed the importance of medication and low salt diet compliance, keeping appts. and reinforced the use of urgent care clinic for non emergent issues when PCP unavailable.  Pt. verbalized understanding to all instructions.     Jahaira Kaba - 12/7/2021 9:50 CST   Barriers to Care   SDoH Eat Less Than You Should :   No   SDoH Shut Off Services to Your Home :   No   SDoH No Regular Place to Live :   No   SDoH Needed Provider but Costs too Much :   No   SDoH Help Reading and Writing Paper Work :   No   SDoH Feel Lonely Often :   No   SDoH Missed Appt/Meds- No Transportation :   Yes   SDoH Call Dr To Be Seen Right Away :   No   SDoH Medical Problems Cause ED Visit :   Yes   SDoH Other Problems Affecting Health :   No   SDoH Reviewed :   Yes   SDoH Dentist Seen in Last Year :   Yes   Jahaira Kaba - 12/7/2021 9:50 CST   Appointments   PCP Visit Upcoming :   No   PCP Visit Within Year :   Yes   PCP Visit Upcoming Reason :   Sick   PCP Visit One Year Date :   12/2/2021 CST   Follow-Up Specialist Appt Scheduled :   Yes   Type of Specialist :   Dermatologist (1/2022, pt. unsure of date)   Follow-Up Lab Appt Scheduled  :   No   Follow-Up Radiology Appt Scheduled :   No   Jahaira Kaba - 12/7/2021 9:50 CST   Navigation   Initial Assesment Completed By :   Phone   ED FIN :   [id number]   MCIP Navigation Call Log :   Third follow up call   Referral To HE Care :   MCIP Navigation   Transportation Arrangements Made :   Yes   Providers Patient Visited Last Year :   Augustus Schmidt III, FNP  [facility]  Dermatologist   Root Causes for High-ED Utilization :   Chronic conditions, Lack of access to care, Lack of transportation   Plan: :   Educated on appropriate ED utilization, Educated on alternate means of health care; ie urgent care when PCP not available, Educated on importance of follow up care with PCP, Other: Medication and low salt diet compliance   Participation in Activity Designed to Address Lack of Annual Ambulatory or Preventative Care Visit? :   Yes   Participation in Activity Designed to Address Avoidable ED Utilization? :   Yes   During Current Measurement Year, Did Enrollee Receive Education Regarding Outpatient Primary Care Options? :   Yes   During Current Measurement Year, Did Enrollee Receive an Appt Reminder 24-48 Hours Before a Scheduled Appt? :   Yes   During Current Measurement Year, Did the Network Provider Schedule and Appt or Provide a Referral to Enrollee? :   Yes   Education Provided :   Verbal   Jahaira Kaba - 12/7/2021 9:50 CST   Durable Medical Equipment   Do You Have Current DME at Home? :   Yes   Anticipated Home Equipment :   Blood pressure monitor   DME Frequency of Use :   Daily   DME Duration of Use :   Life time   DME Supplies: :   Has all necessary supplies   Jahaira Kaba - 12/7/2021 9:50 CST

## 2022-08-03 ENCOUNTER — PATIENT OUTREACH (OUTPATIENT)
Dept: EMERGENCY MEDICINE | Facility: HOSPITAL | Age: 57
End: 2022-08-03
Payer: MEDICAID

## 2022-08-03 ENCOUNTER — TELEPHONE (OUTPATIENT)
Dept: EMERGENCY MEDICINE | Facility: HOSPITAL | Age: 57
End: 2022-08-03
Payer: MEDICAID

## 2022-08-03 NOTE — TELEPHONE ENCOUNTER
"Good afternoon,    The patient states she has not gotten any appointments from the referrals submitted.      ENT- Dr Stephen Minaya- referral not received.  Please fax to [phone].    Neurology- Dr. Shae aHrden- phoned numerous numbers listed on the internet and none of them are valid.  Spoke with patient and she wishes the referral go to [facility], [phone] office,  [phone] fax.      Rheumatology- [facility] on 6/24/2022 noted on the referral that they are on divert.  Spoke with patient and she wishes the referral go to   Dr. Jayce Espinal, [phone] office, [phone] fax.  Dr. Espinal is scheduling into December.      Podiatry -Dr. Jose A Wing does not accept patient's insurance.  Instructed patient to contact her insurance company to find a podiatrist in network.      [facility] Did not get referral.  Per Princess "To provider needs to say [facility]"  She states you can also add Dr. Shira Torre to the referral.      Thank you,    Jahaira Kaba  [facility] Care Coordinator  [phone]    "

## 2022-08-03 NOTE — PROGRESS NOTES
Identity verified.  Pt states she is doing well.  She denies SI/HI or seizures.  BP unsure of reading, not at home.  BM 8/3/22, ETOH use 7/30/22.  Pt states she has not gotten any appointments from the numerous referrals submitted.  Will follow-up.  Stressed the importance of medication and low salt diet compliance, keeping appointments and reinforced the use of urgent care clinic for non emergent issues when PCP unavailable.  Patient verbalized understanding to all instructions.    [phone] Spoke with Ronel at Dr. Stephen Minaya's office, [phone], and she states the referral was not received.  She requested to be faxed to [phone].  [phone] Attempted to reach Dr. Shae Harden at [phone], [phone], [phone] and the number was not in service or incorrect.  [phone] Spoke with Shelbie at [facility], [phone], and she states that it was noted on referral on 6/24/22 that the clinic is on divert.  [phone] Spoke with Kym at Dr. Jayce Espinal's office, [phone], and the clinic does accept the patient's insurance and they are scheduling into December.  [phone] Spoke with Dr. Jose A Wing's office, [phone], and the clinic does not accept the patient's insurance.  [phone] Spoke with Princess at [facility], [phone], and referral not received.  1516 Left voicemail for patient requesting return call.  [phone] Identity verified.  Informed patient of the referral findings.  Instructed to contact [employer] to find a podiatrist in network and when she has a name to contact PCP to submit a referral.  Informed pt that [facility] accepts her insurance and obtained permission to request a referral to them.  Also informed her that Dr. Espinal, rheumatologist accepts her insurance and is scheduling into December.  [phone] Identity verified.  Spoke with patient and asked informed her that there is a rheumatologist in [place].  Patient states that is  too far and requested referral be sent to Dr. Espinal.  1550 In basket message sent to PCP regarding referrals.    Check on Referrals 8/17/2022  Follow-up 8/31/2022    Appointments   Follow-Up Appt Scheduled :   No   PCP Visit Upcoming :   Yes  PCP Upcoming Visit Date: 10/12/2022  PCP Visit Upcoming Reason :   Regular visit  PCP Visit Within Year :   Yes   PCP Visit Within Year Date: 7/12/2022  PCP Visit Within Year Reason :   Regular visit   Follow-Up Specialist Appt Scheduled :  No   Type of Specialist :     Follow-Up Lab Appt Scheduled :   No   Follow-Up Date :   Follow-Up Radiology Appt Scheduled :   No       Providers Patient Visited Last Year :     Dr. Diana Schmidt III, FNP  [facility]  Dr. Matteo Mcghee

## 2022-08-05 DIAGNOSIS — M25.50 ARTHRALGIA, UNSPECIFIED JOINT: ICD-10-CM

## 2022-08-05 DIAGNOSIS — H92.09 OTALGIA, UNSPECIFIED LATERALITY: Primary | ICD-10-CM

## 2022-08-05 DIAGNOSIS — N93.9 ABNORMAL UTERINE BLEEDING (AUB): ICD-10-CM

## 2022-08-05 NOTE — TELEPHONE ENCOUNTER
Thank you. I have placed as many of the referral requests as I could for her.  There are some issues with other referrals and I will discuss these with the patient at her next visit.

## 2022-08-17 ENCOUNTER — PATIENT OUTREACH (OUTPATIENT)
Dept: EMERGENCY MEDICINE | Facility: HOSPITAL | Age: 57
End: 2022-08-17
Payer: MEDICAID

## 2022-08-17 NOTE — PROGRESS NOTES
[phone] Per Amanda with Dr. Stephen Minaya's office, the referral has not been received.  She requested the referral be faxed  to [phone].    [phone] Spoke with Kika at Dr. Jayce Espinal's office and obtained an appointment for 12/13/2022 1415.    [id number] Patient notified of appointment with [facility]YN clinic 10/26/2022 1015 and Dr. Espinal 12/13/2022 1415.  She states she has not gotten an appointment with the [facility].    [phone] Per willian with the [facility], the referral has not been received.  She requested the referral be faxed to  [phone]. 1008 Sent In Basket message to PCP with the appointments for [facility] and Dr. Ayala and requested referrals be faxed to Dr. Stephen Minaya and [facility].     Follow up 8/31/2022

## 2022-08-17 NOTE — TELEPHONE ENCOUNTER
Good morning,    Per Amanda with Dr. Stephen Minaya's office, the referral has not been received.  Please fax to [phone].    The  [facility] has not received the referral.  Please fax to  [phone].    The patient has an [facility] appointment 10/26/2022 1015.    The patient has a rheumatology appoints with Dr. Jayce Espinal 12/13/2022 2:15.    Thank you,    Jahaira Kaba  LPJACEK Care Coordinator  [phone]

## 2022-08-23 ENCOUNTER — PATIENT OUTREACH (OUTPATIENT)
Dept: EMERGENCY MEDICINE | Facility: HOSPITAL | Age: 57
End: 2022-08-23
Payer: MEDICAID

## 2022-08-23 NOTE — TELEPHONE ENCOUNTER
Good morning,    What is the status of the ENT and Neurology referrals?  Have the referrals been faxed?    Thank you    Jahaira Kaba LPN Care Coordinator  [phone]

## 2022-08-31 ENCOUNTER — PATIENT OUTREACH (OUTPATIENT)
Dept: EMERGENCY MEDICINE | Facility: HOSPITAL | Age: 57
End: 2022-08-31
Payer: MEDICAID

## 2022-08-31 NOTE — PATIENT INSTRUCTIONS
Why is taking care of your mouth/teeth/gums important?     Your mouth is the opening to your body.  If not kept clean, it can let in sickness to the rest of your body.     Oral Health Care (Dentists)  [facility], [facility].  [address] [place] [address], [phone]  [facility],    [address] [place] [address] [phone]  [facility]  [address] [place] [address], [phone]  [facility]  [address] [facility] [address], [phone]  [facility]  [address] [facility] [address] [phone]  [address] [address] [phone]  [facility], [address] [address] [phone]  [facility], [address] [facility] [address] [phone]  [facility]           [id number] [phone]  [facility], [address] [place] [address], [phone]  Accepts [facility],  and [facility]ita [facility];[address] [place] [address]; [phone]             ACCEPTS [facility],  AND ANITA  [facility]; [place]: [phone]  [facility]; [place]: [phone]

## 2022-08-31 NOTE — PROGRESS NOTES
Identity verified.  Pt states she is doing well.  /97 Instructed to continue low salt diet.  BM 8/31/22 Pt denies seizures, SI/HI or ETOH use.  Pt states she has not seen a dentist recently.  Educated that she needs a dental visit (dentures) every 6 months so that her oral cavity can be evaluated.  Pt states she has not gotten and ENT or Neurology appt.  Will send In Basket message to PCP to follow up.  Pt denies and SDOH.  Stressed the importance of medication and low salt diet compliance, keeping appointments and reinforced the use of urgent care clinic for non emergent issues when PCP unavailable.  Patient verbalized understanding to all instructions.   1105 Sent In Basket message to PCP to find out if referral to ENT (Dr. Stephen Minaya) and [facility] were faxed.    Follow up 9/28/2022    Appointments   Follow-Up Appt Scheduled :   No   PCP Visit Upcoming :   Yes  PCP Upcoming Visit Date: 10/12/2022  PCP Visit Upcoming Reason :   Regular visit  PCP Visit Within Year :   Yes   PCP Visit Within Year Date: 7/12/2022  PCP Visit Within Year Reason :   Regular visit   Follow-Up Specialist Appt Scheduled :  No   Type of Specialist :     GYN 10/26/2022  Rheumatology 12/13/2022  Follow-Up Lab Appt Scheduled :   No   Follow-Up Date :   Follow-Up Radiology Appt Scheduled :   No         Providers Patient Visited Last Year :     Dr. Diana Schmidt III, FNP  [facility]  Dr. Matteo Mcghee

## 2022-08-31 NOTE — TELEPHONE ENCOUNTER
Good morning,     What is the status of the ENT and Neurology referrals?  Have the referrals been faxed?     Thank you     Jahaira Kaba LPN Care Coordinator  [phone]

## 2022-09-01 NOTE — TELEPHONE ENCOUNTER
Thank you. I have forwarded the request to Eleanor as these referrals have been placed several times.  I am awaiting Eleanor's response.

## 2022-09-16 ENCOUNTER — TELEPHONE (OUTPATIENT)
Dept: FAMILY MEDICINE | Facility: CLINIC | Age: 57
End: 2022-09-16
Payer: MEDICAID

## 2022-09-19 ENCOUNTER — HISTORICAL (OUTPATIENT)
Dept: ADMINISTRATIVE | Facility: HOSPITAL | Age: 57
End: 2022-09-19
Payer: MEDICAID

## 2022-09-20 DIAGNOSIS — N93.9 ABNORMAL UTERINE BLEEDING: ICD-10-CM

## 2022-09-20 RX ORDER — MEDROXYPROGESTERONE ACETATE 10 MG/1
10 TABLET ORAL DAILY
Qty: 30 TABLET | Refills: 0 | Status: CANCELLED | OUTPATIENT
Start: 2022-09-20

## 2022-09-20 NOTE — TELEPHONE ENCOUNTER
Refill Routing Note   Medication(s) are not appropriate for processing by [facility] for the following reason(s):      - Outside of protocol    [facility] action(s):  Route          Medication reconciliation completed: No     Appointments  past 12m or future 3m with PCP    Date Provider   Last Visit   7/12/2022 Diana Berry MD   Next Visit   10/12/2022 Diana Berry MD   ED visits in past 90 days: 0        Note composed:1:16 PM 09/20/2022

## 2022-09-28 ENCOUNTER — PATIENT OUTREACH (OUTPATIENT)
Dept: EMERGENCY MEDICINE | Facility: HOSPITAL | Age: 57
End: 2022-09-28
Payer: MEDICAID

## 2022-10-04 NOTE — TELEPHONE ENCOUNTER
Good morning,    Per Aruna with [facility], the referral has not been received.    Per Ronel with Dr. Stephen Minaya office, the referral has not been received.    There is no indication in  if these referrals were faxed.  If not, please fax.

## 2022-10-05 NOTE — PROGRESS NOTES
dentity verified.  Pt states she is dong well.  BP, did not check, just getting out of bed.  BM 9/28/22, Pt denies SI/HI, but endorses ETOH use Saturday.  Pt states she has not gotten an ENT appt yet and she states she received a phone call from neurology.  She states she has called a couple of times to contact the office and has had to leave messages.  Will follow up.  Pt states she has not gotten a dental appt yet.  She states she will contact Dr. Kuo's office.  Patient denies any SDOH barriers at this time. Stressed the importance of medication and low salt diet compliance, keeping appointments and reinforced the use of urgent care clinic for non emergent issues when PCP unavailable.  Patient verbalized understanding to all instructions.   [phone] Spoke with Aruna at [facility] and she states the referral has not been received.  [phone] Left voicemail requesting phone number of neurology clinic patient received call from.  [phone] Spoke with Ronel with Dr. Jose Minaya office and she states the referral was not received. She requested it be faxed to [phone].    10/4/2022 1146 Sent In Basket message to PCP to inform her that referrals to ENT and [facility] were not received.    Appointment Reminder 10/11/2022  Follow up 10/26/2022    Appointments   Follow-Up Appt Scheduled :   No   PCP Visit Upcoming :   Yes  PCP Upcoming Visit Date: 10/12/2022  PCP Visit Upcoming Reason :   Regular visit  PCP Visit Within Year :   Yes   PCP Visit Within Year Date: 7/12/2022  PCP Visit Within Year Reason :   Regular visit   Follow-Up Specialist Appt Scheduled :  No   Type of Specialist :     GYN 10/26/2022  Rheumatology 12/13/2022  Follow-Up Lab Appt Scheduled :   No   Follow-Up Date :   Follow-Up Radiology Appt Scheduled :   No         Providers Patient Visited Last Year :     Dr. Diana Schmidt III, FNP  [facility]  Dr. Matteo Martinez  [place]

## 2022-10-11 ENCOUNTER — PATIENT OUTREACH (OUTPATIENT)
Dept: EMERGENCY MEDICINE | Facility: HOSPITAL | Age: 57
End: 2022-10-11
Payer: MEDICAID

## 2022-10-11 NOTE — PROGRESS NOTES
[phone] Identity verified.  Informed patient that neurology and ENT referrals were received.  Neurology will schedule in 4-6 weeks and ENT will schedule in 2-4 weeks.  Patient states PCP office cancelled appointment for 10/12/22 0800 and rescheduled for 11/2/22 1000.  Informed patient that she has not been seen in the ED in more than 6 months and [facility]  Pt verbalized understanding to all instructions.      Patient has not been to the ED in more than 6 months.  Encounter closed.

## 2022-10-18 ENCOUNTER — PATIENT OUTREACH (OUTPATIENT)
Dept: EMERGENCY MEDICINE | Facility: HOSPITAL | Age: 57
End: 2022-10-18
Payer: MEDICAID

## 2022-10-18 RX ORDER — MEDROXYPROGESTERONE ACETATE 10 MG/1
10 TABLET ORAL DAILY
Qty: 30 TABLET | Refills: 0 | OUTPATIENT
Start: 2022-10-18

## 2022-10-18 NOTE — PROGRESS NOTES
8426 Patient left voicemail requesting return call.  [phone] Returned patient call. Identiy verified. Patient requested to know what upcoming appointments she had. Information provided. Patient verbalized understanding.

## 2022-10-20 NOTE — TELEPHONE ENCOUNTER
Provider Staff:     Action required for this patient.    Please note Refusal of medication.            Requested Prescriptions     Refused Prescriptions Disp Refills    medroxyPROGESTERone (PROVERA) 10 MG tablet 30 tablet 0     Sig: Take 1 tablet (10 mg total) by mouth once daily.     Refused By: RASHID STOVALL     Reason for Refusal: Refill not appropriate      Thanks!  [facility]   Note composed: 10/19/2022 7:01 PM

## 2022-10-26 ENCOUNTER — OFFICE VISIT (OUTPATIENT)
Dept: GYNECOLOGY | Facility: CLINIC | Age: 57
End: 2022-10-26
Payer: MEDICARE

## 2022-10-26 VITALS
HEIGHT: 62 IN | RESPIRATION RATE: 18 BRPM | OXYGEN SATURATION: 97 % | HEART RATE: 69 BPM | TEMPERATURE: 98 F | BODY MASS INDEX: 25.84 KG/M2 | SYSTOLIC BLOOD PRESSURE: 119 MMHG | DIASTOLIC BLOOD PRESSURE: 79 MMHG | WEIGHT: 140.44 LBS

## 2022-10-26 DIAGNOSIS — N93.9 ABNORMAL UTERINE BLEEDING: ICD-10-CM

## 2022-10-26 DIAGNOSIS — N32.81 OVERACTIVE BLADDER: Primary | ICD-10-CM

## 2022-10-26 DIAGNOSIS — N93.9 ABNORMAL UTERINE BLEEDING (AUB): ICD-10-CM

## 2022-10-26 LAB
APPEARANCE UR: CLEAR
BACTERIA #/AREA URNS AUTO: ABNORMAL /HPF
BILIRUB UR QL STRIP.AUTO: NEGATIVE MG/DL
COLOR UR AUTO: ABNORMAL
GLUCOSE UR QL STRIP.AUTO: NORMAL MG/DL
HYALINE CASTS #/AREA URNS LPF: ABNORMAL /LPF
KETONES UR QL STRIP.AUTO: NEGATIVE MG/DL
LEUKOCYTE ESTERASE UR QL STRIP.AUTO: 25 UNIT/L
MUCOUS THREADS URNS QL MICRO: ABNORMAL /LPF
NITRITE UR QL STRIP.AUTO: NEGATIVE
PH UR STRIP.AUTO: 7 [PH]
PROT UR QL STRIP.AUTO: NEGATIVE MG/DL
RBC #/AREA URNS AUTO: ABNORMAL /HPF
RBC UR QL AUTO: ABNORMAL UNIT/L
SP GR UR STRIP.AUTO: 1.01
SQUAMOUS #/AREA URNS LPF: ABNORMAL /HPF
UROBILINOGEN UR STRIP-ACNC: ABNORMAL MG/DL
WBC #/AREA URNS AUTO: ABNORMAL /HPF

## 2022-10-26 PROCEDURE — 99215 OFFICE O/P EST HI 40 MIN: CPT | Mod: PBBFAC

## 2022-10-26 PROCEDURE — 87088 URINE BACTERIA CULTURE: CPT

## 2022-10-26 PROCEDURE — 81001 URINALYSIS AUTO W/SCOPE: CPT

## 2022-10-26 RX ORDER — OXYBUTYNIN CHLORIDE 5 MG/1
5 TABLET, EXTENDED RELEASE ORAL DAILY
Qty: 30 TABLET | Refills: 11 | Status: SHIPPED | OUTPATIENT
Start: 2022-10-26 | End: 2023-10-26

## 2022-10-26 RX ORDER — ASPIRIN 81 MG/1
81 TABLET ORAL
COMMUNITY
Start: 2021-09-13

## 2022-10-26 RX ORDER — HYDROXYZINE PAMOATE 25 MG/1
25 CAPSULE ORAL NIGHTLY
Qty: 30 CAPSULE | Refills: 0 | Status: SHIPPED | OUTPATIENT
Start: 2022-10-26 | End: 2022-11-25

## 2022-10-26 RX ORDER — LEVETIRACETAM 1000 MG/1
1000 TABLET ORAL
COMMUNITY
Start: 2022-09-09 | End: 2023-02-16

## 2022-10-26 NOTE — PROGRESS NOTES
[facility] GYNECOLOGY CLINIC NOTE     Chloé Willams is a 57 y.o.  presenting to GYN clinic for annual WWE. Reports that she went through menopause 3 years ago has not had any postmenopausal bleeding. Has been experiencing hot flashes predominantly at night since that time, denies any any issues with vaginal dryness or dyspareunia. Pt does however report some issues with urge incontinence, states that she'll feel a sudden strong urge to urinate with occasional leakage. DTF: 6-7xday, NTF: 2x. No dysuria or pain with bladder filling. Has bowel movements 2-3x/day. No other concerns.     Gynecology  No hx of STIs   NILM, no HPV results   Denies hx of abnormal pap smears     Ob hx:   CS x2    x1  SAB x 1     Health maintenance   Mammogram scheduled    Last mammogram 2-3 years ago   Colonoscopy 2 years ago    Past Medical History:   Diagnosis Date    Aphasia 10/26/2017    Hyperlipidemia     Hypertension    - b/l blood clots  - Hx of stroke     Past Surgical History:   Procedure Laterality Date    KALEIGH Leg Stents        SECTION      TUBAL LIGATION        Current Outpatient Medications   Medication Instructions    amLODIPine (NORVASC) 10 mg, Oral, Daily    aspirin (ECOTRIN) 81 mg, Oral    atorvastatin (LIPITOR) 40 mg, Oral, Daily    ciprofloxacin-dexamethasone 0.3-0.1% (CIPRODEX) 0.3-0.1 % DrpS 4 drops, Both Ears, 2 times daily    citalopram (CELEXA) 20 mg, Oral, Daily    clopidogreL (PLAVIX) 75 mg tablet No dose, route, or frequency recorded.    conjugated estrogens (PREMARIN) 1 g, Vaginal, Daily, Apply nightly for 2 weeks, and then apply twice/week    hydrOXYzine pamoate (VISTARIL) 25 mg, Oral, Nightly    levETIRAcetam (KEPPRA) 500 mg, Oral, 2 times daily    levETIRAcetam (KEPPRA) 1,000 mg, Oral    linaCLOtide (LINZESS) 290 mcg, Oral    LINZESS 290 mcg Cap capsule No dose, route, or frequency recorded.    medroxyPROGESTERone (PROVERA) 10 mg, Oral, Daily    methocarbamoL (ROBAXIN) 750 MG Tab TAKE  "1 TABLET BY MOUTH EVERY 8 HOURS AS NEEDED FOR MUSCLE SPASMS    oxybutynin (DITROPAN-XL) 5 mg, Oral, Daily    pantoprazole (PROTONIX) 40 mg, Oral    predniSONE (DELTASONE) 20 mg, Oral, 2 times daily    traZODone (DESYREL) 100 MG tablet No dose, route, or frequency recorded.     Social History     Tobacco Use    Smoking status: Every Day     Packs/day: 0.00     Years: 15.00     Pack years: 0.00     Types: Cigarettes    Smokeless tobacco: Never    Tobacco comments:     stop on own   Substance Use Topics    Alcohol use: Yes     Comment: occasionally    Drug use: Never     Fmhx: Negative x 4    Review of Systems  Pertinent items are noted in HPI.     Objective:     /79 (BP Location: Left arm, Patient Position: Sitting, BP Method: Large (Automatic))   Pulse 69   Temp 98.2 °F (36.8 °C)   Resp 18   Ht 5' 2" (1.575 m)   Wt 63.7 kg (140 lb 6.9 oz)   LMP  (LMP Unknown)   SpO2 97%   BMI 25.69 kg/m²   Physical Exam:  Gen: Well-nourished, well-developed female appearing stated age. Alert, cooperative, in no acute distress.  Breasts: Bilateral: Appearance of the breasts was without rashes or lesions. Palpation of the right breast revealed no lumps, tenderness, or nipple discharge  Palpation of the left breast revealed no lumps, tenderness, or nipple discharge   CV: rrr, no murmurs/rubs/gallops  Chest: ctabl, no wheezes/rales/rhonchi  Abdomen: Soft, non-tender, no masses.  Extrem: Extremities normal, atraumatic, non-tender calves.  External genitalia: Normal female genetalia without lesion, discharge or tenderness. Normal vulvar architecture.   Speculum Exam: Vaginal vault without discharge, nonodorous, no lesions/masses seen.  Atrophic vaginal mucosa noted. Cervical os visualized as closed, no lesions/masses.   Bimanual Exam: No cervical motion tenderness.  Uterus freely mobile.  Normal size uterus. No adnexal masses.  Nontender exam.   Note: RN chaperone present for entirety of exam.      Assessment/Plan:       57 " y.o.  here for annual WWE, also with complaints of hot flashes and OAB symptoms.     Vasomotor Sxs  - Pt not a candidate for HRT given stroke, blood clot hx   - Since hot flashes are primarily at night, will initiate vistaril 25 mg qhs     OAB  - UA and Ucx collected to rule out infection  - Will initiate on oxybutynin 5 mg daily  - Will initiate on Premarin cream for sphincter stability  - Since both oxybutynin and vistaril are anticholinergic drugs, counseled pt to discontinue vistaril if she becomes too drowsy during the day    Annual WWE  - Annual mammogram scheduled for   - Colonoscopy, pap smear UTD   - Pt encouraged to discontinue cigarette usage, is considering trying nicotine patches   1. Overactive bladder  Urinalysis    Urine Culture High Risk      2. Abnormal uterine bleeding  Ambulatory referral/consult to Gynecology      3. Abnormal uterine bleeding (AUB)  Ambulatory referral/consult to Gynecology          Return to clinic in 6 months via telehealth for vasomotor and OAB follo-wup     Discussed patient and plan with Dr. Provost Myra Martínez MD  [employer] [facility] PGY-2

## 2022-10-28 LAB — BACTERIA UR CULT: NORMAL

## 2022-11-02 ENCOUNTER — OFFICE VISIT (OUTPATIENT)
Dept: FAMILY MEDICINE | Facility: CLINIC | Age: 57
End: 2022-11-02
Payer: MEDICARE

## 2022-11-02 VITALS
HEIGHT: 62 IN | OXYGEN SATURATION: 99 % | BODY MASS INDEX: 25.95 KG/M2 | DIASTOLIC BLOOD PRESSURE: 86 MMHG | TEMPERATURE: 98 F | HEART RATE: 72 BPM | SYSTOLIC BLOOD PRESSURE: 133 MMHG | WEIGHT: 141 LBS

## 2022-11-02 DIAGNOSIS — F51.01 PRIMARY INSOMNIA: ICD-10-CM

## 2022-11-02 DIAGNOSIS — G47.00 INSOMNIA, UNSPECIFIED TYPE: ICD-10-CM

## 2022-11-02 DIAGNOSIS — Z12.11 COLON CANCER SCREENING: ICD-10-CM

## 2022-11-02 DIAGNOSIS — F32.A DEPRESSION, UNSPECIFIED DEPRESSION TYPE: ICD-10-CM

## 2022-11-02 DIAGNOSIS — Z00.00 WELLNESS EXAMINATION: Primary | ICD-10-CM

## 2022-11-02 DIAGNOSIS — R63.0 DECREASED APPETITE: ICD-10-CM

## 2022-11-02 DIAGNOSIS — K21.9 GASTROESOPHAGEAL REFLUX DISEASE WITHOUT ESOPHAGITIS: ICD-10-CM

## 2022-11-02 DIAGNOSIS — R79.9 ABNORMAL FINDING OF BLOOD CHEMISTRY, UNSPECIFIED: ICD-10-CM

## 2022-11-02 DIAGNOSIS — K21.9 GASTROESOPHAGEAL REFLUX DISEASE, UNSPECIFIED WHETHER ESOPHAGITIS PRESENT: ICD-10-CM

## 2022-11-02 LAB
ALBUMIN SERPL-MCNC: 4.2 GM/DL (ref 3.5–5)
ALBUMIN/GLOB SERPL: 1.4 RATIO (ref 1.1–2)
ALP SERPL-CCNC: 78 UNIT/L (ref 40–150)
ALT SERPL-CCNC: 27 UNIT/L (ref 0–55)
AST SERPL-CCNC: 32 UNIT/L (ref 5–34)
BASOPHILS # BLD AUTO: 0.04 X10(3)/MCL (ref 0–0.2)
BASOPHILS NFR BLD AUTO: 0.7 %
BILIRUBIN DIRECT+TOT PNL SERPL-MCNC: 1 MG/DL
BUN SERPL-MCNC: 8.1 MG/DL (ref 9.8–20.1)
CALCIUM SERPL-MCNC: 9.6 MG/DL (ref 8.4–10.2)
CHLORIDE SERPL-SCNC: 105 MMOL/L (ref 98–107)
CO2 SERPL-SCNC: 27 MMOL/L (ref 22–29)
CREAT SERPL-MCNC: 0.94 MG/DL (ref 0.55–1.02)
EOSINOPHIL # BLD AUTO: 0.36 X10(3)/MCL (ref 0–0.9)
EOSINOPHIL NFR BLD AUTO: 6 %
ERYTHROCYTE [DISTWIDTH] IN BLOOD BY AUTOMATED COUNT: 13.1 % (ref 11.5–17)
EST. AVERAGE GLUCOSE BLD GHB EST-MCNC: 99.7 MG/DL
GFR SERPLBLD CREATININE-BSD FMLA CKD-EPI: >60 MLS/MIN/1.73/M2
GLOBULIN SER-MCNC: 2.9 GM/DL (ref 2.4–3.5)
GLUCOSE SERPL-MCNC: 78 MG/DL (ref 74–100)
HAV IGM SERPL QL IA: NONREACTIVE
HBA1C MFR BLD: 5.1 %
HBV CORE IGM SERPL QL IA: NONREACTIVE
HBV SURFACE AG SERPL QL IA: NONREACTIVE
HCT VFR BLD AUTO: 44.6 % (ref 37–47)
HCV AB SERPL QL IA: NONREACTIVE
HGB BLD-MCNC: 15 GM/DL (ref 12–16)
HIV 1+2 AB+HIV1 P24 AG SERPL QL IA: NONREACTIVE
IMM GRANULOCYTES # BLD AUTO: 0.05 X10(3)/MCL (ref 0–0.04)
IMM GRANULOCYTES NFR BLD AUTO: 0.8 %
LYMPHOCYTES # BLD AUTO: 1.16 X10(3)/MCL (ref 0.6–4.6)
LYMPHOCYTES NFR BLD AUTO: 19.5 %
MCH RBC QN AUTO: 31.6 PG (ref 27–31)
MCHC RBC AUTO-ENTMCNC: 33.6 MG/DL (ref 33–36)
MCV RBC AUTO: 94.1 FL (ref 80–94)
MONOCYTES # BLD AUTO: 0.38 X10(3)/MCL (ref 0.1–1.3)
MONOCYTES NFR BLD AUTO: 6.4 %
NEUTROPHILS # BLD AUTO: 4 X10(3)/MCL (ref 2.1–9.2)
NEUTROPHILS NFR BLD AUTO: 66.6 %
NRBC BLD AUTO-RTO: 0 %
PLATELET # BLD AUTO: 265 X10(3)/MCL (ref 130–400)
PMV BLD AUTO: 9.9 FL (ref 7.4–10.4)
POTASSIUM SERPL-SCNC: 4.2 MMOL/L (ref 3.5–5.1)
PROT SERPL-MCNC: 7.1 GM/DL (ref 6.4–8.3)
RBC # BLD AUTO: 4.74 X10(6)/MCL (ref 4.2–5.4)
SODIUM SERPL-SCNC: 142 MMOL/L (ref 136–145)
T PALLIDUM AB SER QL: NONREACTIVE
T4 FREE SERPL-MCNC: 0.93 NG/DL (ref 0.7–1.48)
TSH SERPL-ACNC: 1.43 UIU/ML (ref 0.35–4.94)
WBC # SPEC AUTO: 6 X10(3)/MCL (ref 4.5–11.5)

## 2022-11-02 PROCEDURE — 87389 HIV-1 AG W/HIV-1&-2 AB AG IA: CPT | Performed by: STUDENT IN AN ORGANIZED HEALTH CARE EDUCATION/TRAINING PROGRAM

## 2022-11-02 PROCEDURE — 3008F BODY MASS INDEX DOCD: CPT | Mod: CPTII,,, | Performed by: STUDENT IN AN ORGANIZED HEALTH CARE EDUCATION/TRAINING PROGRAM

## 2022-11-02 PROCEDURE — 80074 ACUTE HEPATITIS PANEL: CPT | Performed by: STUDENT IN AN ORGANIZED HEALTH CARE EDUCATION/TRAINING PROGRAM

## 2022-11-02 PROCEDURE — 86780 TREPONEMA PALLIDUM: CPT | Performed by: STUDENT IN AN ORGANIZED HEALTH CARE EDUCATION/TRAINING PROGRAM

## 2022-11-02 PROCEDURE — 3008F PR BODY MASS INDEX (BMI) DOCUMENTED: ICD-10-PCS | Mod: CPTII,,, | Performed by: STUDENT IN AN ORGANIZED HEALTH CARE EDUCATION/TRAINING PROGRAM

## 2022-11-02 PROCEDURE — 3075F PR MOST RECENT SYSTOLIC BLOOD PRESS GE 130-139MM HG: ICD-10-PCS | Mod: CPTII,,, | Performed by: STUDENT IN AN ORGANIZED HEALTH CARE EDUCATION/TRAINING PROGRAM

## 2022-11-02 PROCEDURE — 84439 ASSAY OF FREE THYROXINE: CPT | Performed by: STUDENT IN AN ORGANIZED HEALTH CARE EDUCATION/TRAINING PROGRAM

## 2022-11-02 PROCEDURE — 83036 HEMOGLOBIN GLYCOSYLATED A1C: CPT | Performed by: STUDENT IN AN ORGANIZED HEALTH CARE EDUCATION/TRAINING PROGRAM

## 2022-11-02 PROCEDURE — 3079F DIAST BP 80-89 MM HG: CPT | Mod: CPTII,,, | Performed by: STUDENT IN AN ORGANIZED HEALTH CARE EDUCATION/TRAINING PROGRAM

## 2022-11-02 PROCEDURE — 36415 COLL VENOUS BLD VENIPUNCTURE: CPT | Performed by: STUDENT IN AN ORGANIZED HEALTH CARE EDUCATION/TRAINING PROGRAM

## 2022-11-02 PROCEDURE — 99214 OFFICE O/P EST MOD 30 MIN: CPT | Mod: PBBFAC,PN | Performed by: STUDENT IN AN ORGANIZED HEALTH CARE EDUCATION/TRAINING PROGRAM

## 2022-11-02 PROCEDURE — 99214 OFFICE O/P EST MOD 30 MIN: CPT | Mod: S$PBB,,, | Performed by: STUDENT IN AN ORGANIZED HEALTH CARE EDUCATION/TRAINING PROGRAM

## 2022-11-02 PROCEDURE — 84443 ASSAY THYROID STIM HORMONE: CPT | Performed by: STUDENT IN AN ORGANIZED HEALTH CARE EDUCATION/TRAINING PROGRAM

## 2022-11-02 PROCEDURE — 1159F PR MEDICATION LIST DOCUMENTED IN MEDICAL RECORD: ICD-10-PCS | Mod: CPTII,,, | Performed by: STUDENT IN AN ORGANIZED HEALTH CARE EDUCATION/TRAINING PROGRAM

## 2022-11-02 PROCEDURE — 3075F SYST BP GE 130 - 139MM HG: CPT | Mod: CPTII,,, | Performed by: STUDENT IN AN ORGANIZED HEALTH CARE EDUCATION/TRAINING PROGRAM

## 2022-11-02 PROCEDURE — 99214 PR OFFICE/OUTPT VISIT, EST, LEVL IV, 30-39 MIN: ICD-10-PCS | Mod: S$PBB,,, | Performed by: STUDENT IN AN ORGANIZED HEALTH CARE EDUCATION/TRAINING PROGRAM

## 2022-11-02 PROCEDURE — 80053 COMPREHEN METABOLIC PANEL: CPT | Performed by: STUDENT IN AN ORGANIZED HEALTH CARE EDUCATION/TRAINING PROGRAM

## 2022-11-02 PROCEDURE — 1159F MED LIST DOCD IN RCRD: CPT | Mod: CPTII,,, | Performed by: STUDENT IN AN ORGANIZED HEALTH CARE EDUCATION/TRAINING PROGRAM

## 2022-11-02 PROCEDURE — 85025 COMPLETE CBC W/AUTO DIFF WBC: CPT | Performed by: STUDENT IN AN ORGANIZED HEALTH CARE EDUCATION/TRAINING PROGRAM

## 2022-11-02 PROCEDURE — 3079F PR MOST RECENT DIASTOLIC BLOOD PRESSURE 80-89 MM HG: ICD-10-PCS | Mod: CPTII,,, | Performed by: STUDENT IN AN ORGANIZED HEALTH CARE EDUCATION/TRAINING PROGRAM

## 2022-11-02 RX ORDER — MIRTAZAPINE 15 MG/1
15 TABLET, FILM COATED ORAL NIGHTLY
Qty: 30 TABLET | Refills: 11 | Status: SHIPPED | OUTPATIENT
Start: 2022-11-02 | End: 2023-12-11 | Stop reason: SDUPTHER

## 2022-11-02 RX ORDER — METHOCARBAMOL 750 MG/1
1500 TABLET, FILM COATED ORAL
COMMUNITY
Start: 2021-09-13 | End: 2022-12-13

## 2022-11-02 RX ORDER — TRAZODONE HYDROCHLORIDE 100 MG/1
100 TABLET ORAL
COMMUNITY
Start: 2021-09-13 | End: 2022-11-02

## 2022-11-03 PROBLEM — F51.01 PRIMARY INSOMNIA: Status: ACTIVE | Noted: 2022-11-03

## 2022-11-03 PROBLEM — K21.9 GASTROESOPHAGEAL REFLUX DISEASE WITHOUT ESOPHAGITIS: Status: ACTIVE | Noted: 2022-11-03

## 2022-11-03 PROBLEM — H60.333 CHRONIC SWIMMER'S EAR OF BOTH SIDES: Status: RESOLVED | Noted: 2022-07-12 | Resolved: 2022-11-03

## 2022-11-03 LAB — PATH REV: NORMAL

## 2022-11-03 NOTE — PROGRESS NOTES
Subjective:       Patient ID: Chloé Willams is a 57 y.o. female.    Chief Complaint: Follow-up (Pt complains of back pain and loss of appetite)    HPI     Back Pain  8/10 Dull and aching and sharp at times.   Does have appointment with Dr. Little Dec 13, 2:15 pm    DINESH/CeNp  Continues with right hand nodules and pain   Will refer to rheum once available       Foot pain  Points to spot on plantar surface of right foot with complaint that this area hurts  Has been referred to podiatry and patient understands that she needed to call the number on the back of her insurance card    Depression/Anxiety   Celexa 20 mg .  Patient reports that this is doing helping and would like to continue on dose at this time.     Seizures  Takes Keppra 500 mg BID  Has been referred to neurology     Ear issues  Has seen ENT     HTN  Amlodipine 10 mg     Insomnia  Trazodone 100 mg- states not working as well.   Would like to try something else      GERD/Constipation/Decreased appetite  Linzess 290 mcg  Protonix 40 mg      HLD/CVA  Atorvastatin 40 mg  Plavix 75 mg   Review of Systems   Constitutional:  Negative for chills, fever and unexpected weight change.   HENT:  Negative for nasal congestion.    Respiratory:  Negative for cough, shortness of breath and wheezing.    Cardiovascular:  Negative for chest pain and leg swelling.   Genitourinary:  Negative for dysuria.   Musculoskeletal:  Positive for arthralgias, back pain and joint swelling.   Integumentary:  Negative for rash and wound.   Neurological:  Negative for dizziness and syncope.   Psychiatric/Behavioral: Negative.         Objective:      Physical Exam  Constitutional:       General: She is not in acute distress.  Eyes:      General: No scleral icterus.     Extraocular Movements: Extraocular movements intact.      Conjunctiva/sclera: Conjunctivae normal.      Pupils: Pupils are equal, round, and reactive to light.   Cardiovascular:      Rate and Rhythm: Normal rate and regular  rhythm.      Heart sounds: No murmur heard.  Pulmonary:      Effort: Pulmonary effort is normal. No respiratory distress.      Breath sounds: No stridor. No wheezing or rhonchi.   Abdominal:      General: Bowel sounds are normal. There is no distension.      Palpations: Abdomen is soft.      Tenderness: There is no abdominal tenderness. There is no guarding.   Musculoskeletal:         General: No deformity. Normal range of motion.      Comments: Nodule noted to right hand  Foot with plantar wart on right  No CVA tenderness    Skin:     General: Skin is warm and dry.      Coloration: Skin is not jaundiced.      Findings: No rash.   Neurological:      Mental Status: She is alert.      Gait: Gait normal.   Psychiatric:         Thought Content: Thought content normal.       Assessment:       Problem List Items Addressed This Visit    None  Visit Diagnoses       Wellness examination    -  Primary    Relevant Orders    Pathologist Interpretation    Decreased appetite        Relevant Medications    mirtazapine (REMERON) 15 MG tablet    Other Relevant Orders    Pathologist Interpretation    Colon cancer screening        Relevant Orders    Cologuard Screening (Multitarget Stool DNA)    Abnormal finding of blood chemistry, unspecified        Gastroesophageal reflux disease, unspecified whether esophagitis present        Relevant Orders    Helicobacter Pylori Antigen Fecal EIA    Insomnia, unspecified type        Relevant Medications    mirtazapine (REMERON) 15 MG tablet    Depression, unspecified depression type        Relevant Medications    mirtazapine (REMERON) 15 MG tablet            Plan:       Problem List Items Addressed This Visit          GI    Gastroesophageal reflux disease without esophagitis    Overview     H. Pylori test              Other    Primary insomnia    Overview     Stopping Trazodone and starting remeron 15 mg  Adjunct for depression, insomnia and appetite           Other Visit Diagnoses       Wellness  examination    -  Primary    Relevant Orders    Pathologist Interpretation    Decreased appetite        Relevant Medications    mirtazapine (REMERON) 15 MG tablet    Other Relevant Orders    Pathologist Interpretation    Colon cancer screening        Relevant Orders    Cologuard Screening (Multitarget Stool DNA)    Abnormal finding of blood chemistry, unspecified        Gastroesophageal reflux disease, unspecified whether esophagitis present        Relevant Orders    Helicobacter Pylori Antigen Fecal EIA    Insomnia, unspecified type        Relevant Medications    mirtazapine (REMERON) 15 MG tablet    Depression, unspecified depression type        Relevant Medications    mirtazapine (REMERON) 15 MG tablet           Problem List Items Addressed This Visit    None  Visit Diagnoses       Wellness examination    -  Primary    Relevant Orders    Pathologist Interpretation    Decreased appetite        Relevant Medications    mirtazapine (REMERON) 15 MG tablet    Other Relevant Orders    Pathologist Interpretation    Colon cancer screening        Relevant Orders    Cologuard Screening (Multitarget Stool DNA)    Abnormal finding of blood chemistry, unspecified        Gastroesophageal reflux disease, unspecified whether esophagitis present        Relevant Orders    Helicobacter Pylori Antigen Fecal EIA    Insomnia, unspecified type        Relevant Medications    mirtazapine (REMERON) 15 MG tablet    Depression, unspecified depression type        Relevant Medications    mirtazapine (REMERON) 15 MG tablet        Follow up in about 2 weeks (around 11/16/2022) for Virtual Visit, Insomnia.

## 2022-11-13 ENCOUNTER — HOSPITAL ENCOUNTER (EMERGENCY)
Facility: HOSPITAL | Age: 57
Discharge: HOME | End: 2022-11-14
Attending: INTERNAL MEDICINE
Payer: MEDICARE

## 2022-11-13 DIAGNOSIS — F41.0 PANIC ATTACK: Primary | ICD-10-CM

## 2022-11-13 DIAGNOSIS — F10.920 ALCOHOLIC INTOXICATION WITHOUT COMPLICATION: ICD-10-CM

## 2022-11-13 LAB
BASOPHILS # BLD AUTO: 0.04 X10(3)/MCL (ref 0–0.2)
BASOPHILS NFR BLD AUTO: 0.7 %
EOSINOPHIL # BLD AUTO: 0.44 X10(3)/MCL (ref 0–0.9)
EOSINOPHIL NFR BLD AUTO: 7.5 %
ERYTHROCYTE [DISTWIDTH] IN BLOOD BY AUTOMATED COUNT: 13 % (ref 11.5–17)
HCT VFR BLD AUTO: 43.6 % (ref 37–47)
HGB BLD-MCNC: 14.2 GM/DL (ref 12–16)
IMM GRANULOCYTES # BLD AUTO: 0.02 X10(3)/MCL (ref 0–0.04)
IMM GRANULOCYTES NFR BLD AUTO: 0.3 %
LYMPHOCYTES # BLD AUTO: 1.85 X10(3)/MCL (ref 0.6–4.6)
LYMPHOCYTES NFR BLD AUTO: 31.6 %
MCH RBC QN AUTO: 30.7 PG (ref 27–31)
MCHC RBC AUTO-ENTMCNC: 32.6 MG/DL (ref 33–36)
MCV RBC AUTO: 94.4 FL (ref 80–94)
MONOCYTES # BLD AUTO: 0.48 X10(3)/MCL (ref 0.1–1.3)
MONOCYTES NFR BLD AUTO: 8.2 %
NEUTROPHILS # BLD AUTO: 3 X10(3)/MCL (ref 2.1–9.2)
NEUTROPHILS NFR BLD AUTO: 51.7 %
PLATELET # BLD AUTO: 246 X10(3)/MCL (ref 130–400)
PMV BLD AUTO: 9.4 FL (ref 7.4–10.4)
RBC # BLD AUTO: 4.62 X10(6)/MCL (ref 4.2–5.4)
WBC # SPEC AUTO: 5.9 X10(3)/MCL (ref 4.5–11.5)

## 2022-11-13 PROCEDURE — 85025 COMPLETE CBC W/AUTO DIFF WBC: CPT | Performed by: INTERNAL MEDICINE

## 2022-11-13 PROCEDURE — 82550 ASSAY OF CK (CPK): CPT | Performed by: INTERNAL MEDICINE

## 2022-11-13 PROCEDURE — 80053 COMPREHEN METABOLIC PANEL: CPT | Performed by: INTERNAL MEDICINE

## 2022-11-13 PROCEDURE — 82077 ASSAY SPEC XCP UR&BREATH IA: CPT | Performed by: INTERNAL MEDICINE

## 2022-11-13 PROCEDURE — 99283 EMERGENCY DEPT VISIT LOW MDM: CPT

## 2022-11-14 VITALS
DIASTOLIC BLOOD PRESSURE: 66 MMHG | HEART RATE: 70 BPM | WEIGHT: 150 LBS | RESPIRATION RATE: 18 BRPM | TEMPERATURE: 98 F | SYSTOLIC BLOOD PRESSURE: 136 MMHG | OXYGEN SATURATION: 98 % | BODY MASS INDEX: 27.44 KG/M2

## 2022-11-14 LAB
ALBUMIN SERPL-MCNC: 3.9 GM/DL (ref 3.5–5)
ALBUMIN/GLOB SERPL: 1.3 RATIO (ref 1.1–2)
ALP SERPL-CCNC: 74 UNIT/L (ref 40–150)
ALT SERPL-CCNC: 22 UNIT/L (ref 0–55)
AMPHET UR QL SCN: NEGATIVE
APPEARANCE UR: CLEAR
AST SERPL-CCNC: 25 UNIT/L (ref 5–34)
BACTERIA #/AREA URNS AUTO: ABNORMAL /HPF
BARBITURATE SCN PRESENT UR: NEGATIVE
BENZODIAZ UR QL SCN: NEGATIVE
BILIRUB UR QL STRIP.AUTO: NEGATIVE MG/DL
BILIRUBIN DIRECT+TOT PNL SERPL-MCNC: 0.5 MG/DL
BUN SERPL-MCNC: 10 MG/DL (ref 9.8–20.1)
CALCIUM SERPL-MCNC: 8.8 MG/DL (ref 8.4–10.2)
CANNABINOIDS UR QL SCN: NEGATIVE
CHLORIDE SERPL-SCNC: 107 MMOL/L (ref 98–107)
CK SERPL-CCNC: 169 U/L (ref 29–168)
CO2 SERPL-SCNC: 21 MMOL/L (ref 22–29)
COCAINE UR QL SCN: NEGATIVE
COLOR UR AUTO: YELLOW
CREAT SERPL-MCNC: 0.78 MG/DL (ref 0.55–1.02)
ETHANOL SERPL-MCNC: 218 MG/DL
FENTANYL UR QL SCN: NEGATIVE
GFR SERPLBLD CREATININE-BSD FMLA CKD-EPI: >60 MLS/MIN/1.73/M2
GLOBULIN SER-MCNC: 2.9 GM/DL (ref 2.4–3.5)
GLUCOSE SERPL-MCNC: 91 MG/DL (ref 74–100)
GLUCOSE UR QL STRIP.AUTO: NEGATIVE MG/DL
KETONES UR QL STRIP.AUTO: NEGATIVE MG/DL
LEUKOCYTE ESTERASE UR QL STRIP.AUTO: ABNORMAL UNIT/L
MDMA UR QL SCN: NEGATIVE
NITRITE UR QL STRIP.AUTO: NEGATIVE
OPIATES UR QL SCN: NEGATIVE
PCP UR QL: NEGATIVE
PH UR STRIP.AUTO: 5 [PH]
PH UR: 5 [PH] (ref 3–11)
POTASSIUM SERPL-SCNC: 3.6 MMOL/L (ref 3.5–5.1)
PROT SERPL-MCNC: 6.8 GM/DL (ref 6.4–8.3)
PROT UR QL STRIP.AUTO: NEGATIVE MG/DL
RBC #/AREA URNS AUTO: ABNORMAL /HPF
RBC UR QL AUTO: ABNORMAL UNIT/L
SODIUM SERPL-SCNC: 140 MMOL/L (ref 136–145)
SP GR UR STRIP.AUTO: 1.02
SPECIFIC GRAVITY, URINE AUTO (.000) (OHS): 1.02 (ref 1–1.03)
SQUAMOUS #/AREA URNS AUTO: ABNORMAL /HPF
UROBILINOGEN UR STRIP-ACNC: 0.2 MG/DL
WBC #/AREA URNS AUTO: ABNORMAL /HPF

## 2022-11-14 PROCEDURE — 81001 URINALYSIS AUTO W/SCOPE: CPT | Performed by: INTERNAL MEDICINE

## 2022-11-14 PROCEDURE — 81003 URINALYSIS AUTO W/O SCOPE: CPT | Performed by: INTERNAL MEDICINE

## 2022-11-14 PROCEDURE — 80307 DRUG TEST PRSMV CHEM ANLYZR: CPT | Performed by: INTERNAL MEDICINE

## 2022-11-14 NOTE — ED PROVIDER NOTES
Encounter Date: 2022       History     Chief Complaint   Patient presents with    Anxiety     Family reports pt had seizure like activity tonight. Pt admits to ETOH. Aaox4. Cbg 106.      57-year-old black female brought in by EMS with questionable seizure-like activity versus panic attack with pseudo seizure.  There was no postictal state patient has been drinking alcohol day and a family member states looks like she had a panic attack and then started shaking so EMS was called so she can be evaluated    Review of patient's allergies indicates:  No Known Allergies  Past Medical History:   Diagnosis Date    Anxiety disorder, unspecified     Aphasia 10/26/2017    Hyperlipidemia     Hypertension     Seizures      Past Surgical History:   Procedure Laterality Date    KALEIGH Leg Stents        SECTION      TUBAL LIGATION       No family history on file.  Social History     Tobacco Use    Smoking status: Every Day     Packs/day: 0.50     Years: 15.00     Pack years: 7.50     Types: Cigarettes    Smokeless tobacco: Never    Tobacco comments:     stop on own   Substance Use Topics    Alcohol use: Yes     Comment: occasionally    Drug use: Never     Review of Systems   Constitutional: Negative.  Negative for activity change, appetite change, chills, diaphoresis, fatigue, fever and unexpected weight change.   HENT: Negative.  Negative for congestion, dental problem, drooling, ear discharge, ear pain, facial swelling, hearing loss, mouth sores, nosebleeds, postnasal drip, rhinorrhea, sinus pressure, sinus pain, sneezing, sore throat, tinnitus, trouble swallowing and voice change.    Eyes: Negative.  Negative for photophobia, pain, discharge, redness, itching and visual disturbance.   Respiratory: Negative.  Negative for apnea, cough, choking, chest tightness, shortness of breath, wheezing and stridor.    Cardiovascular: Negative.  Negative for chest pain, palpitations and leg swelling.   Gastrointestinal: Negative.   Negative for abdominal distention, abdominal pain, anal bleeding, blood in stool, constipation, diarrhea, nausea, rectal pain and vomiting.   Endocrine: Negative.  Negative for cold intolerance, heat intolerance, polydipsia, polyphagia and polyuria.   Genitourinary: Negative.  Negative for decreased urine volume, difficulty urinating, dyspareunia, dysuria, enuresis, flank pain, frequency, genital sores, hematuria, menstrual problem, pelvic pain, urgency, vaginal bleeding, vaginal discharge and vaginal pain.   Musculoskeletal: Negative.  Negative for arthralgias, back pain, gait problem, joint swelling, myalgias, neck pain and neck stiffness.   Skin: Negative.  Negative for color change, pallor, rash and wound.   Allergic/Immunologic: Negative.  Negative for environmental allergies, food allergies and immunocompromised state.   Neurological: Negative.  Negative for dizziness, tremors, seizures, syncope, facial asymmetry, speech difficulty, weakness, light-headedness, numbness and headaches.   Hematological: Negative.  Negative for adenopathy. Does not bruise/bleed easily.   Psychiatric/Behavioral:  Negative for agitation, behavioral problems, confusion, decreased concentration, dysphoric mood, hallucinations, self-injury, sleep disturbance and suicidal ideas. The patient is nervous/anxious. The patient is not hyperactive.    All other systems reviewed and are negative.    Physical Exam     Initial Vitals [11/13/22 2335]   BP Pulse Resp Temp SpO2   130/77 68 18 98.4 °F (36.9 °C) 97 %      MAP       --         Physical Exam    Nursing note and vitals reviewed.  Constitutional: She appears well-developed and well-nourished. She is not diaphoretic. No distress.   HENT:   Head: Normocephalic and atraumatic.   Mouth/Throat: Oropharynx is clear and moist. No oropharyngeal exudate.   Eyes: Conjunctivae and EOM are normal. Pupils are equal, round, and reactive to light. No scleral icterus.   Neck: Neck supple. No JVD present.    Normal range of motion.  Cardiovascular:  Normal rate, regular rhythm, normal heart sounds and intact distal pulses.     Exam reveals no gallop and no friction rub.       No murmur heard.  Pulmonary/Chest: Breath sounds normal. No respiratory distress. She has no wheezes. She exhibits no tenderness.   Abdominal: Abdomen is soft. Bowel sounds are normal. She exhibits no distension. There is no abdominal tenderness. There is no rebound.   Musculoskeletal:         General: Normal range of motion.      Cervical back: Normal range of motion and neck supple.     Neurological: She is alert and oriented to person, place, and time. She has normal strength and normal reflexes.   Skin: Skin is warm and dry. Capillary refill takes less than 2 seconds.   Psychiatric: She has a normal mood and affect. Her behavior is normal. Judgment and thought content normal.       ED Course   Procedures  Admission on 11/13/2022   Component Date Value Ref Range Status    Sodium Level 11/13/2022 140  136 - 145 mmol/L Final    Potassium Level 11/13/2022 3.6  3.5 - 5.1 mmol/L Final    Chloride 11/13/2022 107  98 - 107 mmol/L Final    Carbon Dioxide 11/13/2022 21 (L)  22 - 29 mmol/L Final    Glucose Level 11/13/2022 91  74 - 100 mg/dL Final    Blood Urea Nitrogen 11/13/2022 10.0  9.8 - 20.1 mg/dL Final    Creatinine 11/13/2022 0.78  0.55 - 1.02 mg/dL Final    Calcium Level Total 11/13/2022 8.8  8.4 - 10.2 mg/dL Final    Protein Total 11/13/2022 6.8  6.4 - 8.3 gm/dL Final    Albumin Level 11/13/2022 3.9  3.5 - 5.0 gm/dL Final    Globulin 11/13/2022 2.9  2.4 - 3.5 gm/dL Final    Albumin/Globulin Ratio 11/13/2022 1.3  1.1 - 2.0 ratio Final    Bilirubin Total 11/13/2022 0.5  <=1.5 mg/dL Final    Alkaline Phosphatase 11/13/2022 74  40 - 150 unit/L Final    Alanine Aminotransferase 11/13/2022 22  0 - 55 unit/L Final    Aspartate Aminotransferase 11/13/2022 25  5 - 34 unit/L Final    eGFR 11/13/2022 >60  mls/min/1.73/m2 Final    Color, UA 11/14/2022  Yellow  Yellow, Light-Yellow, Dark Yellow, Princess, Straw Final    Appearance, UA 11/14/2022 Clear  Clear Final    Specific Gravity, UA 11/14/2022 1.020   Final    pH, UA 11/14/2022 5.0  5.0 - 8.5 Final    Protein, UA 11/14/2022 Negative  Negative mg/dL Final    Glucose, UA 11/14/2022 Negative  Negative, Normal mg/dL Final    Ketones, UA 11/14/2022 Negative  Negative mg/dL Final    Blood, UA 11/14/2022 Small (A)  Negative unit/L Final    Bilirubin, UA 11/14/2022 Negative  Negative mg/dL Final    Urobilinogen, UA 11/14/2022 0.2  0.2, 1.0, Normal mg/dL Final    Nitrites, UA 11/14/2022 Negative  Negative Final    Leukocyte Esterase, UA 11/14/2022 Trace (A)  Negative unit/L Final    Amphetamines, Urine 11/14/2022 Negative  Negative Final    Barbituates, Urine 11/14/2022 Negative  Negative Final    Benzodiazepine, Urine 11/14/2022 Negative  Negative Final    Cannabinoids, Urine 11/14/2022 Negative  Negative Final    Cocaine, Urine 11/14/2022 Negative  Negative Final    Fentanyl, Urine 11/14/2022 Negative  Negative Final    MDMA, Urine 11/14/2022 Negative  Negative Final    Opiates, Urine 11/14/2022 Negative  Negative Final    Phencyclidine, Urine 11/14/2022 Negative  Negative Final    pH, Urine 11/14/2022 5.0  3.0 - 11.0 Final    Specific Gravity, Urine Auto 11/14/2022 1.020  1.001 - 1.035 Final    Ethanol Level 11/13/2022 218.0 (H)  <=10.0 mg/dL Final    Creatine Kinase 11/13/2022 169 (H)  29 - 168 U/L Final    WBC 11/13/2022 5.9  4.5 - 11.5 x10(3)/mcL Final    RBC 11/13/2022 4.62  4.20 - 5.40 x10(6)/mcL Final    Hgb 11/13/2022 14.2  12.0 - 16.0 gm/dL Final    Hct 11/13/2022 43.6  37.0 - 47.0 % Final    MCV 11/13/2022 94.4 (H)  80.0 - 94.0 fL Final    MCH 11/13/2022 30.7  27.0 - 31.0 pg Final    MCHC 11/13/2022 32.6 (L)  33.0 - 36.0 mg/dL Final    RDW 11/13/2022 13.0  11.5 - 17.0 % Final    Platelet 11/13/2022 246  130 - 400 x10(3)/mcL Final    MPV 11/13/2022 9.4  7.4 - 10.4 fL Final    Neut % 11/13/2022 51.7  % Final     Lymph % 11/13/2022 31.6  % Final    Mono % 11/13/2022 8.2  % Final    Eos % 11/13/2022 7.5  % Final    Basophil % 11/13/2022 0.7  % Final    Lymph # 11/13/2022 1.85  0.6 - 4.6 x10(3)/mcL Final    Neut # 11/13/2022 3.0  2.1 - 9.2 x10(3)/mcL Final    Mono # 11/13/2022 0.48  0.1 - 1.3 x10(3)/mcL Final    Eos # 11/13/2022 0.44  0 - 0.9 x10(3)/mcL Final    Baso # 11/13/2022 0.04  0 - 0.2 x10(3)/mcL Final    IG# 11/13/2022 0.02  0 - 0.04 x10(3)/mcL Final    IG% 11/13/2022 0.3  % Final    Bacteria, UA 11/14/2022 Few (A)  None Seen, Rare, Occasional /HPF Final    RBC, UA 11/14/2022 3-5  None Seen, 0-2, 3-5, 0-5 /HPF Final    WBC, UA 11/14/2022 3-5  None Seen, 0-2, 3-5, 0-5 /HPF Final    Squamous Epithelial Cells, UA 11/14/2022 Few (A)  None Seen, Rare, Occasional, Occ /HPF Final       Labs Reviewed   COMPREHENSIVE METABOLIC PANEL - Abnormal; Notable for the following components:       Result Value    Carbon Dioxide 21 (*)     All other components within normal limits   URINALYSIS, REFLEX TO URINE CULTURE - Abnormal; Notable for the following components:    Blood, UA Small (*)     Leukocyte Esterase, UA Trace (*)     All other components within normal limits   ALCOHOL,MEDICAL (ETHANOL) - Abnormal; Notable for the following components:    Ethanol Level 218.0 (*)     All other components within normal limits   CK - Abnormal; Notable for the following components:    Creatine Kinase 169 (*)     All other components within normal limits   CBC WITH DIFFERENTIAL - Abnormal; Notable for the following components:    MCV 94.4 (*)     MCHC 32.6 (*)     All other components within normal limits   URINALYSIS, MICROSCOPIC - Abnormal; Notable for the following components:    Bacteria, UA Few (*)     Squamous Epithelial Cells, UA Few (*)     All other components within normal limits   DRUG SCREEN, URINE (BEAKER) - Normal    Narrative:     Cut off concentrations:    Amphetamines - 1000 ng/ml  Barbiturates - 200 ng/ml  Benzodiazepine - 200  ng/ml  Cannabinoids (THC) - 50 ng/ml  Cocaine - 300 ng/ml  Fentanyl - 1.0 ng/ml  MDMA - 500 ng/ml  Opiates - 300 ng/ml   Phencyclidine (PCP) - 25 ng/ml    Specimen submitted for drug analysis and tested for pH and specific gravity in order to evaluate sample integrity. Suspect tampering if specific gravity is <1.003 and/or pH is not within the range of 4.5 - 8.0  False negatives may result form substances such as bleach added to urine.  False positives may result for the presence of a substance with similar chemical structure to the drug or its metabolite.    This test provides only a PRELIMINARY analytical test result. A more specific alternate chemical method must be used in order to obtain a confirmed analytical result. Gas chromatography/mass spectrometry (GC/MS) is the preferred confirmatory method. Other chemical confirmation methods are available. Clinical consideration and professional judgement should be applied to any drug of abuse test result, particularly when preliminary positive results are used.    Positive results will be confirmed only at the physicians request. Unconfirmed screening results are to be used only for medical purposes (treatment).        CBC W/ AUTO DIFFERENTIAL    Narrative:     The following orders were created for panel order CBC auto differential.  Procedure                               Abnormality         Status                     ---------                               -----------         ------                     CBC with Differential[[id number]]        Abnormal            Final result                 Please view results for these tests on the individual orders.          Imaging Results    None          Medications - No data to display                           Clinical Impression:   Final diagnoses:  [F41.0] Panic attack (Primary)  [F10.920] Alcoholic intoxication without complication      ED Disposition Condition    Discharge Stable          ED Prescriptions    None        Follow-up Information       Follow up With Specialties Details Why Contact Info    Diana Berry MD Family Medicine In 3 days  [facility] [facility] [address] [phone]            Sharyn Le is a certified MA and was present during the entire interaction with this patient      Margarito Gonzales MD  11/14/22 4125

## 2022-12-13 ENCOUNTER — OFFICE VISIT (OUTPATIENT)
Dept: RHEUMATOLOGY | Facility: CLINIC | Age: 57
End: 2022-12-13
Payer: MEDICARE

## 2022-12-13 VITALS
RESPIRATION RATE: 18 BRPM | DIASTOLIC BLOOD PRESSURE: 83 MMHG | OXYGEN SATURATION: 98 % | WEIGHT: 143.19 LBS | HEART RATE: 70 BPM | BODY MASS INDEX: 25.37 KG/M2 | HEIGHT: 63 IN | SYSTOLIC BLOOD PRESSURE: 134 MMHG | TEMPERATURE: 99 F

## 2022-12-13 DIAGNOSIS — F51.01 PRIMARY INSOMNIA: ICD-10-CM

## 2022-12-13 DIAGNOSIS — M79.7 FIBROMYALGIA SYNDROME: ICD-10-CM

## 2022-12-13 DIAGNOSIS — M35.9 UNDIFFERENTIATED CONNECTIVE TISSUE DISEASE: Primary | ICD-10-CM

## 2022-12-13 PROCEDURE — 3079F PR MOST RECENT DIASTOLIC BLOOD PRESSURE 80-89 MM HG: ICD-10-PCS | Mod: CPTII,S$GLB,, | Performed by: INTERNAL MEDICINE

## 2022-12-13 PROCEDURE — 99204 PR OFFICE/OUTPT VISIT, NEW, LEVL IV, 45-59 MIN: ICD-10-PCS | Mod: S$GLB,,, | Performed by: INTERNAL MEDICINE

## 2022-12-13 PROCEDURE — 3079F DIAST BP 80-89 MM HG: CPT | Mod: CPTII,S$GLB,, | Performed by: INTERNAL MEDICINE

## 2022-12-13 PROCEDURE — 1159F PR MEDICATION LIST DOCUMENTED IN MEDICAL RECORD: ICD-10-PCS | Mod: CPTII,S$GLB,, | Performed by: INTERNAL MEDICINE

## 2022-12-13 PROCEDURE — 99204 OFFICE O/P NEW MOD 45 MIN: CPT | Mod: S$GLB,,, | Performed by: INTERNAL MEDICINE

## 2022-12-13 PROCEDURE — 3075F SYST BP GE 130 - 139MM HG: CPT | Mod: CPTII,S$GLB,, | Performed by: INTERNAL MEDICINE

## 2022-12-13 PROCEDURE — 99999 PR PBB SHADOW E&M-EST. PATIENT-LVL IV: ICD-10-PCS | Mod: PBBFAC,,, | Performed by: INTERNAL MEDICINE

## 2022-12-13 PROCEDURE — 1159F MED LIST DOCD IN RCRD: CPT | Mod: CPTII,S$GLB,, | Performed by: INTERNAL MEDICINE

## 2022-12-13 PROCEDURE — 3075F PR MOST RECENT SYSTOLIC BLOOD PRESS GE 130-139MM HG: ICD-10-PCS | Mod: CPTII,S$GLB,, | Performed by: INTERNAL MEDICINE

## 2022-12-13 PROCEDURE — 3008F BODY MASS INDEX DOCD: CPT | Mod: CPTII,S$GLB,, | Performed by: INTERNAL MEDICINE

## 2022-12-13 PROCEDURE — 3008F PR BODY MASS INDEX (BMI) DOCUMENTED: ICD-10-PCS | Mod: CPTII,S$GLB,, | Performed by: INTERNAL MEDICINE

## 2022-12-13 PROCEDURE — 99999 PR PBB SHADOW E&M-EST. PATIENT-LVL IV: CPT | Mod: PBBFAC,,, | Performed by: INTERNAL MEDICINE

## 2022-12-13 RX ORDER — AMLODIPINE BESYLATE 10 MG/1
10 TABLET ORAL DAILY
COMMUNITY
End: 2023-02-16 | Stop reason: SDUPTHER

## 2022-12-13 RX ORDER — TIZANIDINE 4 MG/1
4 TABLET ORAL NIGHTLY
Qty: 30 TABLET | Refills: 5 | Status: SHIPPED | OUTPATIENT
Start: 2022-12-13 | End: 2023-03-31 | Stop reason: SDUPTHER

## 2022-12-13 RX ORDER — HYDROXYCHLOROQUINE SULFATE 200 MG/1
200 TABLET, FILM COATED ORAL 2 TIMES DAILY
Qty: 60 TABLET | Refills: 5 | Status: SHIPPED | OUTPATIENT
Start: 2022-12-13 | End: 2023-03-31 | Stop reason: SDUPTHER

## 2022-12-13 NOTE — PROGRESS NOTES
"Subjective:       Patient ID: Chloé Willams is a 57 y.o. female.    Chief Complaint: Referral (Referral for Althralgia unspecified joints. Pt complains of pin in right hand, knees and lower back. Current pain is 8/10/)    Pt  is complaining of joint pain involving his MCP PIP wrist elbow shoulders hips knees and ankles bilaterally.  Is 8/10 in intensity dull in quality and continuous.  It is associated with a morning stiffness lasting for more than 60 minutes. Pt reports having difficulty maintaining a good night of sleep and this has been associated with myalgia of 8/10 in intensity.  This pain is dull continuous and gets worse mainly at night.  It is associated with fatigue.  No fever no chills no others.  DINESH POSITIVE        Review of Systems   Constitutional:  Negative for appetite change, chills and fever.   HENT:  Negative for congestion, ear pain, mouth sores, nosebleeds and trouble swallowing.    Eyes:  Negative for photophobia and discharge.   Respiratory:  Negative for chest tightness and shortness of breath.    Cardiovascular:  Negative for chest pain.   Gastrointestinal:  Negative for abdominal pain and vomiting.   Endocrine: Negative.    Genitourinary:  Negative for hematuria.   Musculoskeletal:         As per HPI   Skin:  Negative for rash.   Neurological:  Negative for weakness.       Objective:   /83 (BP Location: Left arm, Patient Position: Sitting, BP Method: Medium (Automatic))   Pulse 70   Temp 98.6 °F (37 °C) (Oral)   Resp 18   Ht 5' 3" (1.6 m)   Wt 65 kg (143 lb 3.2 oz)   LMP  (LMP Unknown)   SpO2 98%   BMI 25.37 kg/m²      Physical Exam   Constitutional: She is oriented to person, place, and time. She appears well-developed and well-nourished. No distress.   HENT:   Head: Normocephalic and atraumatic.   Right Ear: External ear normal.   Left Ear: External ear normal.   Eyes: Pupils are equal, round, and reactive to light.   Cardiovascular: Normal rate, regular rhythm and " normal heart sounds.   Pulmonary/Chest: Breath sounds normal.   Abdominal: Soft. There is no abdominal tenderness.   Musculoskeletal:      Right shoulder: Tenderness present.      Left shoulder: Tenderness present.      Right elbow: Tenderness present.      Left elbow: Tenderness present.      Right wrist: Tenderness present.      Left wrist: Tenderness present.      Cervical back: Neck supple.      Right hip: Tenderness present.      Left hip: Tenderness present.      Right knee: Tenderness present.      Left knee: Tenderness present.      Right ankle: Tenderness present.      Left ankle: Tenderness present.   Lymphadenopathy:     She has no cervical adenopathy.   Neurological: She is alert and oriented to person, place, and time. She displays normal reflexes. No cranial nerve deficit or sensory deficit. She exhibits normal muscle tone. Coordination normal.   Skin: No rash noted. No erythema.   Vitals reviewed.      Right Side Rheumatological Exam     The patient is tender to palpation of the shoulder, elbow, wrist, knee, 1st PIP, 1st MCP, 2nd PIP, 2nd MCP, 3rd PIP, 3rd MCP, 4th PIP, 4th MCP, 5th PIP, hip, ankle, 1st MTP, 2nd MTP, 3rd MTP, 4th MTP, 5th MTP, 1st toe IP, 2nd toe IP, 3rd toe IP, 4th toe IP and 5th toe IP    Left Side Rheumatological Exam     The patient is tender to palpation of the shoulder, elbow, wrist, knee, 1st PIP, 1st MCP, 2nd PIP, 2nd MCP, 3rd PIP, 3rd MCP, 4th PIP, 4th MCP, 5th PIP, 5th MCP, hip, ankle, 1st MTP, 2nd MTP, 3rd MTP, 4th MTP, 5th MTP, 1st toe IP, 2nd toe IP, 3rd toe IP, 4th toe IP and 5th toe IP.       Completed Fibromyalgia exam 18/18 tender points.  No data to display     Assessment:       1. Undifferentiated connective tissue disease    2. Fibromyalgia syndrome    3. Primary insomnia            Medication List with Changes/Refills   New Medications    HYDROXYCHLOROQUINE (PLAQUENIL) 200 MG TABLET    Take 1 tablet (200 mg total) by mouth 2 (two) times daily. After food        Start Date: 12/13/2022End Date: 6/15/2023    TIZANIDINE (ZANAFLEX) 4 MG TABLET    Take 1 tablet (4 mg total) by mouth nightly.       Start Date: 12/13/2022End Date: 6/11/2023   Current Medications    AMLODIPINE (NORVASC) 10 MG TABLET    Take 10 mg by mouth once daily.       Start Date: --        End Date: --    ASPIRIN (ECOTRIN) 81 MG EC TABLET    Take 81 mg by mouth.       Start Date: 9/13/2021 End Date: --    ATORVASTATIN (LIPITOR) 40 MG TABLET    Take 1 tablet (40 mg total) by mouth once daily.       Start Date: 7/12/2022 End Date: 7/12/2023    CITALOPRAM (CELEXA) 20 MG TABLET    Take 1 tablet (20 mg total) by mouth once daily.       Start Date: 7/12/2022 End Date: --    CLOPIDOGREL (PLAVIX) 75 MG TABLET    Take 75 mg by mouth once daily.       Start Date: 12/27/2021End Date: --    CONJUGATED ESTROGENS (PREMARIN) VAGINAL CREAM    Place 1 g vaginally once daily. Apply nightly for 2 weeks, and then apply twice/week       Start Date: 10/26/2022End Date: 10/26/2023    LEVETIRACETAM (KEPPRA) 1000 MG TABLET    Take 1,000 mg by mouth.       Start Date: 9/9/2022  End Date: --    LINACLOTIDE (LINZESS) 290 MCG CAP CAPSULE    Take 290 mcg by mouth before breakfast.       Start Date: 9/13/2021 End Date: --    MEDROXYPROGESTERONE (PROVERA) 10 MG TABLET    Take 1 tablet (10 mg total) by mouth once daily.       Start Date: 7/12/2022 End Date: --    METHOCARBAMOL (ROBAXIN) 750 MG TAB    TAKE 1 TABLET BY MOUTH EVERY 8 HOURS AS NEEDED FOR MUSCLE SPASMS       Start Date: 5/1/2022  End Date: --    MIRTAZAPINE (REMERON) 15 MG TABLET    Take 1 tablet (15 mg total) by mouth every evening.       Start Date: 11/2/2022 End Date: 11/2/2023    OXYBUTYNIN (DITROPAN-XL) 5 MG TR24    Take 1 tablet (5 mg total) by mouth once daily.       Start Date: 10/26/2022End Date: 10/26/2023    PANTOPRAZOLE (PROTONIX) 40 MG TABLET    Take 40 mg by mouth once daily.       Start Date: 9/13/2021 End Date: --   Discontinued Medications    AMLODIPINE (NORVASC)  10 MG TABLET    Take 1 tablet (10 mg total) by mouth once daily.       Start Date: 10/28/2017End Date: 12/13/2022    BETAMETHASONE ACETATE,SOD PHOS (CELESTONE SOLUSPAN INJ)    Inject 12 mg into the muscle.       Start Date: 9/13/2021 End Date: 12/13/2022    CIPROFLOXACIN-DEXAMETHASONE 0.3-0.1% (CIPRODEX) 0.3-0.1 % DRPS    Place 4 drops into both ears 2 (two) times daily.       Start Date: 7/12/2022 End Date: 12/13/2022    LINZESS 290 MCG CAP CAPSULE           Start Date: 12/27/2021End Date: 12/13/2022    METHOCARBAMOL (ROBAXIN) 750 MG TAB    Take 1,500 mg by mouth.       Start Date: 9/13/2021 End Date: 12/13/2022         Plan:         Problem List Items Addressed This Visit          Other    Primary insomnia    Relevant Medications    hydrOXYchloroQUINE (PLAQUENIL) 200 mg tablet    tiZANidine (ZANAFLEX) 4 MG tablet    Other Relevant Orders    Anti-Smith Antibody    DSDNA IgG By IFA    ANTI-SSA + ANTI-SSB    CBC Auto Differential    Comprehensive Metabolic Panel    CRP, High Sensitivity    Vitamin D    Rheumatoid Quantitative    Cyclic Citrullinated Peptide Antibody, IgG    Antinuclear Ab, HEp-2 Substrate    Hepatitis B Surface Antigen    Hepatitis C Antibody    TSH    T4, Free     Other Visit Diagnoses       Undifferentiated connective tissue disease    -  Primary    Relevant Medications    hydrOXYchloroQUINE (PLAQUENIL) 200 mg tablet    tiZANidine (ZANAFLEX) 4 MG tablet    Other Relevant Orders    Anti-Smith Antibody    DSDNA IgG By IFA    ANTI-SSA + ANTI-SSB    CBC Auto Differential    Comprehensive Metabolic Panel    CRP, High Sensitivity    Vitamin D    Rheumatoid Quantitative    Cyclic Citrullinated Peptide Antibody, IgG    Antinuclear Ab, HEp-2 Substrate    Hepatitis B Surface Antigen    Hepatitis C Antibody    TSH    T4, Free    Fibromyalgia syndrome        Relevant Medications    hydrOXYchloroQUINE (PLAQUENIL) 200 mg tablet    tiZANidine (ZANAFLEX) 4 MG tablet    Other Relevant Orders    Anti-Smith Antibody     DSDNA IgG By IFA    ANTI-SSA + ANTI-SSB    CBC Auto Differential    Comprehensive Metabolic Panel    CRP, High Sensitivity    Vitamin D    Rheumatoid Quantitative    Cyclic Citrullinated Peptide Antibody, IgG    Antinuclear Ab, HEp-2 Substrate    Hepatitis B Surface Antigen    Hepatitis C Antibody    TSH    T4, Free

## 2023-01-25 ENCOUNTER — TELEPHONE (OUTPATIENT)
Dept: FAMILY MEDICINE | Facility: CLINIC | Age: 58
End: 2023-01-25

## 2023-02-13 ENCOUNTER — PATIENT MESSAGE (OUTPATIENT)
Dept: ADMINISTRATIVE | Facility: HOSPITAL | Age: 58
End: 2023-02-13
Payer: MEDICAID

## 2023-02-16 ENCOUNTER — HOSPITAL ENCOUNTER (OUTPATIENT)
Dept: RADIOLOGY | Facility: HOSPITAL | Age: 58
Discharge: HOME | End: 2023-02-16
Attending: STUDENT IN AN ORGANIZED HEALTH CARE EDUCATION/TRAINING PROGRAM
Payer: MEDICARE

## 2023-02-16 ENCOUNTER — OFFICE VISIT (OUTPATIENT)
Dept: FAMILY MEDICINE | Facility: CLINIC | Age: 58
End: 2023-02-16
Payer: MEDICARE

## 2023-02-16 VITALS
SYSTOLIC BLOOD PRESSURE: 129 MMHG | TEMPERATURE: 99 F | HEART RATE: 84 BPM | DIASTOLIC BLOOD PRESSURE: 81 MMHG | HEIGHT: 63 IN | OXYGEN SATURATION: 94 % | BODY MASS INDEX: 25.87 KG/M2 | RESPIRATION RATE: 20 BRPM | WEIGHT: 146 LBS

## 2023-02-16 DIAGNOSIS — R22.31 NODULE OF SKIN OF RIGHT THUMB: ICD-10-CM

## 2023-02-16 DIAGNOSIS — I63.512 ACUTE ISCHEMIC LEFT MCA STROKE: ICD-10-CM

## 2023-02-16 DIAGNOSIS — H60.62 CHRONIC OTITIS EXTERNA OF LEFT EAR, UNSPECIFIED TYPE: ICD-10-CM

## 2023-02-16 DIAGNOSIS — H60.332 ACUTE SWIMMER'S EAR OF LEFT SIDE: ICD-10-CM

## 2023-02-16 DIAGNOSIS — F32.A DEPRESSION, UNSPECIFIED DEPRESSION TYPE: ICD-10-CM

## 2023-02-16 DIAGNOSIS — F51.01 PRIMARY INSOMNIA: ICD-10-CM

## 2023-02-16 DIAGNOSIS — R56.9 SEIZURE: ICD-10-CM

## 2023-02-16 DIAGNOSIS — R22.31 NODULE OF SKIN OF RIGHT THUMB: Primary | ICD-10-CM

## 2023-02-16 DIAGNOSIS — I10 HYPERTENSION, UNSPECIFIED TYPE: ICD-10-CM

## 2023-02-16 DIAGNOSIS — R22.32 NODULE OF SKIN OF LEFT HAND: ICD-10-CM

## 2023-02-16 DIAGNOSIS — F32.A DEPRESSIVE DISORDER: ICD-10-CM

## 2023-02-16 DIAGNOSIS — I63.9 CEREBROVASCULAR ACCIDENT (CVA), UNSPECIFIED MECHANISM: ICD-10-CM

## 2023-02-16 DIAGNOSIS — R82.90 FOUL SMELLING URINE: ICD-10-CM

## 2023-02-16 DIAGNOSIS — N30.00 ACUTE CYSTITIS WITHOUT HEMATURIA: ICD-10-CM

## 2023-02-16 LAB
APPEARANCE UR: CLEAR
BACTERIA #/AREA URNS AUTO: ABNORMAL /HPF
BILIRUB UR QL STRIP.AUTO: NEGATIVE MG/DL
COLOR UR AUTO: ABNORMAL
GLUCOSE UR QL STRIP.AUTO: NORMAL MG/DL
HYALINE CASTS #/AREA URNS LPF: ABNORMAL /LPF
KETONES UR QL STRIP.AUTO: NEGATIVE MG/DL
LEUKOCYTE ESTERASE UR QL STRIP.AUTO: 75 UNIT/L
NITRITE UR QL STRIP.AUTO: NEGATIVE
PH UR STRIP.AUTO: 5.5 [PH]
PROT UR QL STRIP.AUTO: NEGATIVE MG/DL
RBC #/AREA URNS AUTO: ABNORMAL /HPF
RBC UR QL AUTO: NEGATIVE UNIT/L
SP GR UR STRIP.AUTO: 1.01
SQUAMOUS #/AREA URNS LPF: ABNORMAL /HPF
UROBILINOGEN UR STRIP-ACNC: NORMAL MG/DL
WBC #/AREA URNS AUTO: ABNORMAL /HPF

## 2023-02-16 PROCEDURE — 99214 OFFICE O/P EST MOD 30 MIN: CPT | Mod: S$PBB,,, | Performed by: STUDENT IN AN ORGANIZED HEALTH CARE EDUCATION/TRAINING PROGRAM

## 2023-02-16 PROCEDURE — 1159F MED LIST DOCD IN RCRD: CPT | Mod: CPTII,,, | Performed by: STUDENT IN AN ORGANIZED HEALTH CARE EDUCATION/TRAINING PROGRAM

## 2023-02-16 PROCEDURE — 99214 PR OFFICE/OUTPT VISIT, EST, LEVL IV, 30-39 MIN: ICD-10-PCS | Mod: S$PBB,,, | Performed by: STUDENT IN AN ORGANIZED HEALTH CARE EDUCATION/TRAINING PROGRAM

## 2023-02-16 PROCEDURE — 3079F DIAST BP 80-89 MM HG: CPT | Mod: CPTII,,, | Performed by: STUDENT IN AN ORGANIZED HEALTH CARE EDUCATION/TRAINING PROGRAM

## 2023-02-16 PROCEDURE — 3008F BODY MASS INDEX DOCD: CPT | Mod: CPTII,,, | Performed by: STUDENT IN AN ORGANIZED HEALTH CARE EDUCATION/TRAINING PROGRAM

## 2023-02-16 PROCEDURE — 81001 URINALYSIS AUTO W/SCOPE: CPT | Performed by: STUDENT IN AN ORGANIZED HEALTH CARE EDUCATION/TRAINING PROGRAM

## 2023-02-16 PROCEDURE — 3079F PR MOST RECENT DIASTOLIC BLOOD PRESSURE 80-89 MM HG: ICD-10-PCS | Mod: CPTII,,, | Performed by: STUDENT IN AN ORGANIZED HEALTH CARE EDUCATION/TRAINING PROGRAM

## 2023-02-16 PROCEDURE — 3074F PR MOST RECENT SYSTOLIC BLOOD PRESSURE < 130 MM HG: ICD-10-PCS | Mod: CPTII,,, | Performed by: STUDENT IN AN ORGANIZED HEALTH CARE EDUCATION/TRAINING PROGRAM

## 2023-02-16 PROCEDURE — 1159F PR MEDICATION LIST DOCUMENTED IN MEDICAL RECORD: ICD-10-PCS | Mod: CPTII,,, | Performed by: STUDENT IN AN ORGANIZED HEALTH CARE EDUCATION/TRAINING PROGRAM

## 2023-02-16 PROCEDURE — 99214 OFFICE O/P EST MOD 30 MIN: CPT | Mod: PBBFAC,PN | Performed by: STUDENT IN AN ORGANIZED HEALTH CARE EDUCATION/TRAINING PROGRAM

## 2023-02-16 PROCEDURE — 3008F PR BODY MASS INDEX (BMI) DOCUMENTED: ICD-10-PCS | Mod: CPTII,,, | Performed by: STUDENT IN AN ORGANIZED HEALTH CARE EDUCATION/TRAINING PROGRAM

## 2023-02-16 PROCEDURE — 3074F SYST BP LT 130 MM HG: CPT | Mod: CPTII,,, | Performed by: STUDENT IN AN ORGANIZED HEALTH CARE EDUCATION/TRAINING PROGRAM

## 2023-02-16 PROCEDURE — 73130 X-RAY EXAM OF HAND: CPT | Mod: TC,PN,RT

## 2023-02-16 RX ORDER — CIPROFLOXACIN AND DEXAMETHASONE 3; 1 MG/ML; MG/ML
4 SUSPENSION/ DROPS AURICULAR (OTIC) 2 TIMES DAILY
Qty: 7.5 ML | Refills: 1 | Status: SHIPPED | OUTPATIENT
Start: 2023-02-16 | End: 2023-02-26

## 2023-02-16 RX ORDER — AMLODIPINE BESYLATE 10 MG/1
10 TABLET ORAL DAILY
Qty: 90 TABLET | Refills: 3 | Status: SHIPPED | OUTPATIENT
Start: 2023-02-16

## 2023-02-16 RX ORDER — NITROFURANTOIN 25; 75 MG/1; MG/1
100 CAPSULE ORAL 2 TIMES DAILY
Qty: 10 CAPSULE | Refills: 0 | Status: SHIPPED | OUTPATIENT
Start: 2023-02-16 | End: 2023-03-31

## 2023-02-16 RX ORDER — CITALOPRAM 20 MG/1
20 TABLET, FILM COATED ORAL DAILY
Qty: 30 TABLET | Refills: 11 | Status: SHIPPED | OUTPATIENT
Start: 2023-02-16

## 2023-02-16 RX ORDER — LEVETIRACETAM 500 MG/1
500 TABLET ORAL 2 TIMES DAILY
Qty: 180 TABLET | Refills: 3 | Status: SHIPPED | OUTPATIENT
Start: 2023-02-16 | End: 2024-02-16

## 2023-02-16 RX ORDER — PANTOPRAZOLE SODIUM 40 MG/1
40 TABLET, DELAYED RELEASE ORAL DAILY
Qty: 90 TABLET | Refills: 3 | Status: SHIPPED | OUTPATIENT
Start: 2023-02-16 | End: 2023-03-31 | Stop reason: SDUPTHER

## 2023-02-16 RX ORDER — CLOPIDOGREL BISULFATE 75 MG/1
75 TABLET ORAL DAILY
Qty: 90 TABLET | Refills: 3 | Status: SHIPPED | OUTPATIENT
Start: 2023-02-16

## 2023-02-16 NOTE — PROGRESS NOTES
Subjective:       Patient ID: Chloé Willams is a 57 y.o. female.    Chief Complaint: Follow-up    HPI   Acute issue  Patient is again reporting left ear pain.   Would like for it to be looked at. Did establish with ENT    Did not see podiatrist but did not call phone number on back of insurance card.     Also with concern that urine has a strong odor. Is concerned that she has an infection.      Back Pain  8/10 Dull and aching and sharp at times.   See  Dr. Anabel MONTIEL/[facility]  Continues with right hand nodules and pain   Is on Plaquenil    Foot pain  Points to spot on plantar surface of right foot with complaint that this area hurts  Has been referred to podiatry and patient understands that she needed to call the number on the back of her insurance card    Depression/Anxiety   Celexa 20 mg .  Patient reports that this is doing helping and would like to continue on dose at this time.     Seizures  Takes Keppra 500 mg BID  Has been referred to neurology          HTN  Amlodipine 10 mg     Insomnia  Remeron 15 mg for sleep and appetite    GERD/Constipation/Decreased appetite  Protonix 40 mg      HLD/CVA  Atorvastatin 40 mg  Plavix 75 mg       HM Declines vaccines   Review of Systems   Constitutional:  Negative for chills, fever and unexpected weight change.   HENT:  Negative for nasal congestion.    Respiratory:  Negative for cough, shortness of breath and wheezing.    Cardiovascular:  Negative for chest pain and leg swelling.   Genitourinary:  Negative for dysuria.   Musculoskeletal:  Positive for arthralgias, back pain and joint swelling.   Integumentary:  Negative for rash and wound.   Neurological:  Negative for dizziness and syncope.   Psychiatric/Behavioral: Negative.         Objective:      Physical Exam  Constitutional:       General: She is not in acute distress.  Eyes:      General: No scleral icterus.     Extraocular Movements: Extraocular movements intact.      Conjunctiva/sclera: Conjunctivae normal.       Pupils: Pupils are equal, round, and reactive to light.   Cardiovascular:      Rate and Rhythm: Normal rate and regular rhythm.      Heart sounds: No murmur heard.  Pulmonary:      Effort: Pulmonary effort is normal. No respiratory distress.      Breath sounds: No stridor. No wheezing or rhonchi.   Abdominal:      General: Bowel sounds are normal. There is no distension.      Palpations: Abdomen is soft.      Tenderness: There is no abdominal tenderness. There is no guarding.   Musculoskeletal:         General: No deformity. Normal range of motion.      Comments: Nodule noted to right hand  Foot with plantar wart on right  No CVA tenderness    Skin:     General: Skin is warm and dry.      Coloration: Skin is not jaundiced.      Findings: No rash.   Neurological:      Mental Status: She is alert.      Gait: Gait normal.   Psychiatric:         Thought Content: Thought content normal.       Assessment:       Problem List Items Addressed This Visit          Neuro    Acute ischemic left MCA stroke       Psychiatric    Depressive disorder       ENT    Acute swimmer's ear of left side       Cardiac/Vascular    Hypertension    Relevant Medications    amLODIPine (NORVASC) 10 MG tablet       Renal/    Foul smelling urine    Relevant Orders    Urinalysis, Reflex to Urine Culture (Completed)    Acute cystitis without hematuria    Relevant Medications    nitrofurantoin, macrocrystal-monohydrate, (MACROBID) 100 MG capsule       Other    Primary insomnia     Other Visit Diagnoses       Nodule of skin of right thumb    -  Primary    Relevant Orders    X-Ray Hand Complete Right (Completed)    Chronic otitis externa of left ear, unspecified type        Relevant Medications    ciprofloxacin-dexAMETHasone 0.3-0.1% (CIPRODEX) 0.3-0.1 % DrpS    Depression, unspecified depression type        Relevant Medications    citalopram (CELEXA) 20 MG tablet    Cerebrovascular accident (CVA), unspecified mechanism        Relevant Medications     clopidogreL (PLAVIX) 75 mg tablet    Seizure        Relevant Medications    levETIRAcetam (KEPPRA) 500 MG Tab    Nodule of skin of left hand        Relevant Orders    Ambulatory referral/consult to Orthopedics            Plan:       Problem List Items Addressed This Visit          Neuro    Acute ischemic left MCA stroke    Overview     Continue Plavix, lipitor   Advised smoking cessation.    Referred to Smoking cessation            Psychiatric    Depressive disorder    Overview     Continue Celexa; refills given   Remeron 15 mg            ENT    Acute swimmer's ear of left side    Overview     Ciprodex drops   Advised to call ENT as well for f/u            Cardiac/Vascular    Hypertension    Relevant Medications    amLODIPine (NORVASC) 10 MG tablet       Renal/    Foul smelling urine    Overview     UA. Will inform if positive          Relevant Orders    Urinalysis, Reflex to Urine Culture (Completed)    Acute cystitis without hematuria    Overview     Nitrofurantoin 100 BID x 5 days         Relevant Medications    nitrofurantoin, macrocrystal-monohydrate, (MACROBID) 100 MG capsule       Other    Primary insomnia    Overview     Remeron 15 mg  Adjunct for depression, insomnia and appetite           Other Visit Diagnoses       Nodule of skin of right thumb    -  Primary    Relevant Orders    X-Ray Hand Complete Right (Completed)    Chronic otitis externa of left ear, unspecified type        Relevant Medications    ciprofloxacin-dexAMETHasone 0.3-0.1% (CIPRODEX) 0.3-0.1 % DrpS    Depression, unspecified depression type        Relevant Medications    citalopram (CELEXA) 20 MG tablet    Cerebrovascular accident (CVA), unspecified mechanism        Relevant Medications    clopidogreL (PLAVIX) 75 mg tablet    Seizure        Relevant Medications    levETIRAcetam (KEPPRA) 500 MG Tab    Nodule of skin of left hand        Relevant Orders    Ambulatory referral/consult to Orthopedics           Problem List Items Addressed  This Visit          Neuro    Acute ischemic left MCA stroke    Overview     Continue Plavix, lipitor   Advised smoking cessation.    Referred to Smoking cessation            Psychiatric    Depressive disorder    Overview     Continue Celexa; refills given   Remeron 15 mg            ENT    Acute swimmer's ear of left side    Overview     Ciprodex drops   Advised to call ENT as well for f/u            Cardiac/Vascular    Hypertension    Relevant Medications    amLODIPine (NORVASC) 10 MG tablet       Renal/    Foul smelling urine    Overview     UA. Will inform if positive          Relevant Orders    Urinalysis, Reflex to Urine Culture (Completed)    Acute cystitis without hematuria    Overview     Nitrofurantoin 100 BID x 5 days         Relevant Medications    nitrofurantoin, macrocrystal-monohydrate, (MACROBID) 100 MG capsule       Other    Primary insomnia    Overview     Remeron 15 mg  Adjunct for depression, insomnia and appetite           Other Visit Diagnoses       Nodule of skin of right thumb    -  Primary    Relevant Orders    X-Ray Hand Complete Right (Completed)    Chronic otitis externa of left ear, unspecified type        Relevant Medications    ciprofloxacin-dexAMETHasone 0.3-0.1% (CIPRODEX) 0.3-0.1 % DrpS    Depression, unspecified depression type        Relevant Medications    citalopram (CELEXA) 20 MG tablet    Cerebrovascular accident (CVA), unspecified mechanism        Relevant Medications    clopidogreL (PLAVIX) 75 mg tablet    Seizure        Relevant Medications    levETIRAcetam (KEPPRA) 500 MG Tab    Nodule of skin of left hand        Relevant Orders    Ambulatory referral/consult to Orthopedics           Problem List Items Addressed This Visit          Neuro    Acute ischemic left MCA stroke    Overview     Continue Plavix, lipitor   Advised smoking cessation.    Referred to Smoking cessation            Psychiatric    Depressive disorder    Overview     Continue Celexa; refills given   Remeron  15 mg            ENT    Acute swimmer's ear of left side    Overview     Ciprodex drops   Advised to call ENT as well for f/u            Cardiac/Vascular    Hypertension    Relevant Medications    amLODIPine (NORVASC) 10 MG tablet       Renal/    Foul smelling urine    Overview     UA. Will inform if positive          Relevant Orders    Urinalysis, Reflex to Urine Culture (Completed)    Acute cystitis without hematuria    Overview     Nitrofurantoin 100 BID x 5 days         Relevant Medications    nitrofurantoin, macrocrystal-monohydrate, (MACROBID) 100 MG capsule       Other    Primary insomnia    Overview     Remeron 15 mg  Adjunct for depression, insomnia and appetite           Other Visit Diagnoses       Nodule of skin of right thumb    -  Primary    Relevant Orders    X-Ray Hand Complete Right (Completed)    Chronic otitis externa of left ear, unspecified type        Relevant Medications    ciprofloxacin-dexAMETHasone 0.3-0.1% (CIPRODEX) 0.3-0.1 % DrpS    Depression, unspecified depression type        Relevant Medications    citalopram (CELEXA) 20 MG tablet    Cerebrovascular accident (CVA), unspecified mechanism        Relevant Medications    clopidogreL (PLAVIX) 75 mg tablet    Seizure        Relevant Medications    levETIRAcetam (KEPPRA) 500 MG Tab    Nodule of skin of left hand        Relevant Orders    Ambulatory referral/consult to Orthopedics        Follow up in about 3 months (around 5/16/2023) for Hypertension.

## 2023-03-09 ENCOUNTER — TELEPHONE (OUTPATIENT)
Dept: FAMILY MEDICINE | Facility: CLINIC | Age: 58
End: 2023-03-09
Payer: MEDICARE

## 2023-03-10 DIAGNOSIS — M54.50 CHRONIC LOW BACK PAIN, UNSPECIFIED BACK PAIN LATERALITY, UNSPECIFIED WHETHER SCIATICA PRESENT: Primary | ICD-10-CM

## 2023-03-10 DIAGNOSIS — G89.29 CHRONIC LOW BACK PAIN, UNSPECIFIED BACK PAIN LATERALITY, UNSPECIFIED WHETHER SCIATICA PRESENT: Primary | ICD-10-CM

## 2023-03-31 ENCOUNTER — OFFICE VISIT (OUTPATIENT)
Dept: RHEUMATOLOGY | Facility: CLINIC | Age: 58
End: 2023-03-31
Payer: MEDICARE

## 2023-03-31 VITALS
BODY MASS INDEX: 25.98 KG/M2 | SYSTOLIC BLOOD PRESSURE: 121 MMHG | HEIGHT: 63 IN | WEIGHT: 146.63 LBS | HEART RATE: 86 BPM | DIASTOLIC BLOOD PRESSURE: 84 MMHG | OXYGEN SATURATION: 98 % | TEMPERATURE: 99 F

## 2023-03-31 DIAGNOSIS — M35.9 UNDIFFERENTIATED CONNECTIVE TISSUE DISEASE: Primary | ICD-10-CM

## 2023-03-31 DIAGNOSIS — F51.01 PRIMARY INSOMNIA: ICD-10-CM

## 2023-03-31 DIAGNOSIS — G47.00 INSOMNIA, UNSPECIFIED TYPE: ICD-10-CM

## 2023-03-31 DIAGNOSIS — M79.7 FIBROMYALGIA SYNDROME: ICD-10-CM

## 2023-03-31 PROCEDURE — 99999 PR PBB SHADOW E&M-EST. PATIENT-LVL III: ICD-10-PCS | Mod: PBBFAC,,, | Performed by: INTERNAL MEDICINE

## 2023-03-31 PROCEDURE — 3008F BODY MASS INDEX DOCD: CPT | Mod: CPTII,S$GLB,, | Performed by: INTERNAL MEDICINE

## 2023-03-31 PROCEDURE — 3074F SYST BP LT 130 MM HG: CPT | Mod: CPTII,S$GLB,, | Performed by: INTERNAL MEDICINE

## 2023-03-31 PROCEDURE — 3079F PR MOST RECENT DIASTOLIC BLOOD PRESSURE 80-89 MM HG: ICD-10-PCS | Mod: CPTII,S$GLB,, | Performed by: INTERNAL MEDICINE

## 2023-03-31 PROCEDURE — 99999 PR PBB SHADOW E&M-EST. PATIENT-LVL III: CPT | Mod: PBBFAC,,, | Performed by: INTERNAL MEDICINE

## 2023-03-31 PROCEDURE — 1159F PR MEDICATION LIST DOCUMENTED IN MEDICAL RECORD: ICD-10-PCS | Mod: CPTII,S$GLB,, | Performed by: INTERNAL MEDICINE

## 2023-03-31 PROCEDURE — 99214 PR OFFICE/OUTPT VISIT, EST, LEVL IV, 30-39 MIN: ICD-10-PCS | Mod: S$GLB,,, | Performed by: INTERNAL MEDICINE

## 2023-03-31 PROCEDURE — 3079F DIAST BP 80-89 MM HG: CPT | Mod: CPTII,S$GLB,, | Performed by: INTERNAL MEDICINE

## 2023-03-31 PROCEDURE — 99214 OFFICE O/P EST MOD 30 MIN: CPT | Mod: S$GLB,,, | Performed by: INTERNAL MEDICINE

## 2023-03-31 PROCEDURE — 3008F PR BODY MASS INDEX (BMI) DOCUMENTED: ICD-10-PCS | Mod: CPTII,S$GLB,, | Performed by: INTERNAL MEDICINE

## 2023-03-31 PROCEDURE — 3074F PR MOST RECENT SYSTOLIC BLOOD PRESSURE < 130 MM HG: ICD-10-PCS | Mod: CPTII,S$GLB,, | Performed by: INTERNAL MEDICINE

## 2023-03-31 PROCEDURE — 1159F MED LIST DOCD IN RCRD: CPT | Mod: CPTII,S$GLB,, | Performed by: INTERNAL MEDICINE

## 2023-03-31 RX ORDER — TIZANIDINE 4 MG/1
8 TABLET ORAL NIGHTLY
Qty: 180 TABLET | Refills: 5 | Status: SHIPPED | OUTPATIENT
Start: 2023-03-31 | End: 2023-09-27

## 2023-03-31 RX ORDER — PANTOPRAZOLE SODIUM 40 MG/1
40 TABLET, DELAYED RELEASE ORAL DAILY
Qty: 90 TABLET | Refills: 3 | Status: SHIPPED | OUTPATIENT
Start: 2023-03-31

## 2023-03-31 RX ORDER — METHOTREXATE 2.5 MG/1
10 TABLET ORAL
Qty: 60 TABLET | Refills: 5 | Status: SHIPPED | OUTPATIENT
Start: 2023-03-31 | End: 2024-02-06 | Stop reason: SDUPTHER

## 2023-03-31 RX ORDER — FOLIC ACID 1 MG/1
1 TABLET ORAL DAILY
Qty: 90 TABLET | Refills: 5 | Status: SHIPPED | OUTPATIENT
Start: 2023-03-31 | End: 2024-01-31 | Stop reason: SDUPTHER

## 2023-03-31 RX ORDER — HYDROXYCHLOROQUINE SULFATE 200 MG/1
200 TABLET, FILM COATED ORAL 2 TIMES DAILY
Qty: 180 TABLET | Refills: 5 | Status: SHIPPED | OUTPATIENT
Start: 2023-03-31 | End: 2023-10-01

## 2023-03-31 NOTE — PROGRESS NOTES
"Subjective:       Patient ID: Chloé Willams is a 57 y.o. female.    Chief Complaint: Follow-up (FOLLOW UP. PATIENT COMPLAINS OF BACK PAIN AND PIN IN MIDDLE FINGER ON RIGHT HAND. PAIN 8/10)    The patient is complaining of joint pain involving the MCP PIP wrist elbow shoulders hips knees and ankles bilaterally.  The pain is 7/10 in intensity dull in quality and continuous.  That is associated with a morning stiffness lasting for more than 60 minutes.  Is also having difficulty maintaining a good night of sleep.  This has been associated with myalgias.  Muscle aches are 7/10 in intensity dull in quality and continuous.  They are associated with fatigue.  No fever no chills no others.  Labs checked during this visit       Review of Systems   Constitutional:  Negative for appetite change, chills and fever.   HENT:  Negative for congestion, ear pain, mouth sores, nosebleeds and trouble swallowing.    Eyes:  Negative for photophobia and discharge.   Respiratory:  Negative for chest tightness and shortness of breath.    Cardiovascular:  Negative for chest pain.   Gastrointestinal:  Negative for abdominal pain and vomiting.   Endocrine: Negative.    Genitourinary:  Negative for hematuria.   Musculoskeletal:         As per HPI   Skin:  Negative for rash.   Neurological:  Negative for weakness.       Objective:   /84 (BP Location: Right arm, Patient Position: Sitting, BP Method: Medium (Automatic))   Pulse 86   Temp 98.6 °F (37 °C) (Oral)   Ht 5' 3" (1.6 m)   Wt 66.5 kg (146 lb 9.6 oz)   LMP  (LMP Unknown)   SpO2 98%   BMI 25.97 kg/m²      Physical Exam   Constitutional: She is oriented to person, place, and time. She appears well-developed and well-nourished. No distress.   HENT:   Head: Normocephalic and atraumatic.   Right Ear: External ear normal.   Left Ear: External ear normal.   Eyes: Pupils are equal, round, and reactive to light.   Cardiovascular: Normal rate, regular rhythm and normal heart sounds. "   Pulmonary/Chest: Breath sounds normal.   Abdominal: Soft. There is no abdominal tenderness.   Musculoskeletal:      Right shoulder: Tenderness present.      Left shoulder: Tenderness present.      Right elbow: Tenderness present.      Left elbow: Tenderness present.      Right wrist: Tenderness present.      Left wrist: Tenderness present.      Cervical back: Neck supple.      Right hip: Tenderness present.      Left hip: Tenderness present.      Right knee: Tenderness present.      Left knee: Tenderness present.      Right ankle: Tenderness present.      Left ankle: Tenderness present.   Lymphadenopathy:     She has no cervical adenopathy.   Neurological: She is alert and oriented to person, place, and time. She displays normal reflexes. No cranial nerve deficit or sensory deficit. She exhibits normal muscle tone. Coordination normal.   Skin: No rash noted. No erythema.   Vitals reviewed.      Right Side Rheumatological Exam     The patient is tender to palpation of the shoulder, elbow, wrist, knee, 1st PIP, 1st MCP, 2nd PIP, 2nd MCP, 3rd PIP, 3rd MCP, 4th PIP, 4th MCP, 5th PIP, hip, ankle, 1st MTP, 2nd MTP, 3rd MTP, 4th MTP, 5th MTP, 1st toe IP, 2nd toe IP, 3rd toe IP, 4th toe IP and 5th toe IP    Left Side Rheumatological Exam     The patient is tender to palpation of the shoulder, elbow, wrist, knee, 1st PIP, 1st MCP, 2nd PIP, 2nd MCP, 3rd PIP, 3rd MCP, 4th PIP, 4th MCP, 5th PIP, 5th MCP, hip, ankle, 1st MTP, 2nd MTP, 3rd MTP, 4th MTP, 5th MTP, 1st toe IP, 2nd toe IP, 3rd toe IP, 4th toe IP and 5th toe IP.       Completed Fibromyalgia exam 18/18 tender points.  No data to display     Assessment:       1. Undifferentiated connective tissue disease    2. Fibromyalgia syndrome    3. Insomnia, unspecified type    4. Primary insomnia          Medication List with Changes/Refills   New Medications    FOLIC ACID (FOLVITE) 1 MG TABLET    Take 1 tablet (1 mg total) by mouth once daily. After food       Start Date:  3/31/2023 End Date: 9/27/2023    METHOTREXATE 2.5 MG TAB    Take 4 tablets (10 mg total) by mouth every 7 days. EVERY  FRIDAY      TAKE 4 TAB TOGETHER AFTER SUPPER       Start Date: 3/31/2023 End Date: 9/27/2023   Current Medications    AMLODIPINE (NORVASC) 10 MG TABLET    Take 1 tablet (10 mg total) by mouth once daily.       Start Date: 2/16/2023 End Date: --    ASPIRIN (ECOTRIN) 81 MG EC TABLET    Take 81 mg by mouth.       Start Date: 9/13/2021 End Date: --    ATORVASTATIN (LIPITOR) 40 MG TABLET    Take 1 tablet (40 mg total) by mouth once daily.       Start Date: 7/12/2022 End Date: 7/12/2023    CITALOPRAM (CELEXA) 20 MG TABLET    Take 1 tablet (20 mg total) by mouth once daily.       Start Date: 2/16/2023 End Date: --    CLOPIDOGREL (PLAVIX) 75 MG TABLET    Take 1 tablet (75 mg total) by mouth once daily.       Start Date: 2/16/2023 End Date: --    CONJUGATED ESTROGENS (PREMARIN) VAGINAL CREAM    Place 1 g vaginally once daily. Apply nightly for 2 weeks, and then apply twice/week       Start Date: 10/26/2022End Date: 10/26/2023    LEVETIRACETAM (KEPPRA) 500 MG TAB    Take 1 tablet (500 mg total) by mouth 2 (two) times daily.       Start Date: 2/16/2023 End Date: 2/16/2024    LINACLOTIDE (LINZESS) 290 MCG CAP CAPSULE    Take 290 mcg by mouth before breakfast.       Start Date: 9/13/2021 End Date: --    METHOCARBAMOL (ROBAXIN) 750 MG TAB    TAKE 1 TABLET BY MOUTH EVERY 8 HOURS AS NEEDED FOR MUSCLE SPASMS       Start Date: 5/1/2022  End Date: --    MIRTAZAPINE (REMERON) 15 MG TABLET    Take 1 tablet (15 mg total) by mouth every evening.       Start Date: 11/2/2022 End Date: 11/2/2023    OXYBUTYNIN (DITROPAN-XL) 5 MG TR24    Take 1 tablet (5 mg total) by mouth once daily.       Start Date: 10/26/2022End Date: 10/26/2023   Changed and/or Refilled Medications    Modified Medication Previous Medication    HYDROXYCHLOROQUINE (PLAQUENIL) 200 MG TABLET hydrOXYchloroQUINE (PLAQUENIL) 200 mg tablet       Take 1 tablet  (200 mg total) by mouth 2 (two) times daily. After food    Take 1 tablet (200 mg total) by mouth 2 (two) times daily. After food       Start Date: 3/31/2023 End Date: 10/1/2023    Start Date: 12/13/2022End Date: 3/31/2023    PANTOPRAZOLE (PROTONIX) 40 MG TABLET pantoprazole (PROTONIX) 40 MG tablet       Take 1 tablet (40 mg total) by mouth once daily.    Take 1 tablet (40 mg total) by mouth once daily.       Start Date: 3/31/2023 End Date: --    Start Date: 2/16/2023 End Date: 3/31/2023    TIZANIDINE (ZANAFLEX) 4 MG TABLET tiZANidine (ZANAFLEX) 4 MG tablet       Take 2 tablets (8 mg total) by mouth nightly.    Take 1 tablet (4 mg total) by mouth nightly.       Start Date: 3/31/2023 End Date: 9/27/2023    Start Date: 12/13/2022End Date: 3/31/2023   Discontinued Medications    NITROFURANTOIN, MACROCRYSTAL-MONOHYDRATE, (MACROBID) 100 MG CAPSULE    Take 1 capsule (100 mg total) by mouth 2 (two) times daily.       Start Date: 2/16/2023 End Date: 3/31/2023         Plan:         Problem List Items Addressed This Visit          Immunology/Multi System    Undifferentiated connective tissue disease - Primary    Relevant Medications    hydrOXYchloroQUINE (PLAQUENIL) 200 mg tablet    tiZANidine (ZANAFLEX) 4 MG tablet    pantoprazole (PROTONIX) 40 MG tablet    folic acid (FOLVITE) 1 MG tablet    methotrexate 2.5 MG Tab       Orthopedic    Fibromyalgia syndrome    Relevant Medications    hydrOXYchloroQUINE (PLAQUENIL) 200 mg tablet    tiZANidine (ZANAFLEX) 4 MG tablet    pantoprazole (PROTONIX) 40 MG tablet    folic acid (FOLVITE) 1 MG tablet    methotrexate 2.5 MG Tab       Other    Insomnia    Relevant Medications    hydrOXYchloroQUINE (PLAQUENIL) 200 mg tablet    tiZANidine (ZANAFLEX) 4 MG tablet    pantoprazole (PROTONIX) 40 MG tablet    folic acid (FOLVITE) 1 MG tablet    methotrexate 2.5 MG Tab

## 2023-04-26 ENCOUNTER — CLINICAL SUPPORT (OUTPATIENT)
Dept: GYNECOLOGY | Facility: CLINIC | Age: 58
End: 2023-04-26
Payer: MEDICARE

## 2023-04-26 DIAGNOSIS — N32.81 OVERACTIVE BLADDER: Primary | ICD-10-CM

## 2023-04-26 DIAGNOSIS — N95.8 GENITOURINARY SYNDROME OF MENOPAUSE: ICD-10-CM

## 2023-04-26 DIAGNOSIS — N95.1 VASOMOTOR SYMPTOMS DUE TO MENOPAUSE: ICD-10-CM

## 2023-04-26 NOTE — PROGRESS NOTES
Established Patient - Audio Only Telehealth Visit     The patient location is: Home  The chief complaint leading to consultation is: Follow-up of OAB and hot flashes  Visit type: Virtual visit with audio only (telephone)  Total time spent with patient: 10 minutes    The reason for the audio only service rather than synchronous audio and video virtual visit was related to technical difficulties or patient preference/necessity.     Each patient to whom I provide medical services by telemedicine is:  (1) informed of the relationship between the physician and patient and the respective role of any other health care provider with respect to management of the patient; and (2) notified that they may decline to receive medical services by telemedicine and may withdraw from such care at any time. Patient verbally consented to receive this service via voice-only telephone call.     HPI:   From Prior:  Chloé Willams is a 57 y.o.  presenting to GYN clinic for annual WWE. Reports that she went through menopause 3 years ago has not had any postmenopausal bleeding. Has been experiencing hot flashes predominantly at night since that time, denies any any issues with vaginal dryness or dyspareunia. Pt does however report some issues with urge incontinence, states that she'll feel a sudden strong urge to urinate with occasional leakage. DTF: 6-7xday, NTF: 2x. No dysuria or pain with bladder filling. Has bowel movements 2-3x/day. No other concerns.     She is feeling better since starting the oxybutynin and vistaril and has not had episodes of severe daytime drowsiness due to use. She also intermittently using Premarin cream which has been helpful. The hot flashes still occur but are less prominent than they used to be and the nighttime vistaril has helped her to stay asleep. She does not desire starting Effexor or any other SNRI at this time. Her current treatment have improved her symptoms significantly.    Urinalysis was  repeated following trace blood noted on the urinalysis completed at her last visit. Dirty specimen, but no blood was noted.      Assessment and plan:    Problem List Items Addressed This Visit          Renal/    Overactive bladder - Primary     Continue oxybutynin 5mg daily and PRN premarin use to aid with symptoms  Urinalysis reviewed with the patient, although blood noted on initial specimen, repeat without and no nitrites or LE          Vasomotor symptoms due to menopause     Continue nightly vistaril.  Discussed SNRI/SSRIs but discussed that usage may not be show immediate results. She opts to continue vistaril at this time         Genitourinary syndrome of menopause        This service was not originating from a related E/M service provided within the previous 7 days nor will  to an E/M service or procedure within the next 24 hours or my soonest available appointment.  Prevailing standard of care was able to be met in this audio-only visit.      Padma Schreiber MD PGY-4  Obstetrics and Gynecology

## 2023-04-26 NOTE — ASSESSMENT & PLAN NOTE
· Continue oxybutynin 5mg daily and PRN premarin use to aid with symptoms  · Urinalysis reviewed with the patient, although blood noted on initial specimen, repeat without and no nitrites or LE

## 2023-04-26 NOTE — ASSESSMENT & PLAN NOTE
· Continue nightly vistaril.  · Discussed SNRI/SSRIs but discussed that usage may not be show immediate results. She opts to continue vistaril at this time

## 2023-05-16 ENCOUNTER — PATIENT MESSAGE (OUTPATIENT)
Dept: ADMINISTRATIVE | Facility: HOSPITAL | Age: 58
End: 2023-05-16
Payer: MEDICARE

## 2023-05-18 ENCOUNTER — PATIENT MESSAGE (OUTPATIENT)
Dept: ADMINISTRATIVE | Facility: HOSPITAL | Age: 58
End: 2023-05-18
Payer: MEDICARE

## 2023-05-22 PROBLEM — I63.512 ACUTE ISCHEMIC LEFT MCA STROKE: Status: RESOLVED | Noted: 2017-10-27 | Resolved: 2023-05-22

## 2023-08-10 ENCOUNTER — OFFICE VISIT (OUTPATIENT)
Dept: FAMILY MEDICINE | Facility: CLINIC | Age: 58
End: 2023-08-10
Payer: MEDICARE

## 2023-08-10 ENCOUNTER — HOSPITAL ENCOUNTER (OUTPATIENT)
Dept: RADIOLOGY | Facility: HOSPITAL | Age: 58
Discharge: HOME | End: 2023-08-10
Attending: STUDENT IN AN ORGANIZED HEALTH CARE EDUCATION/TRAINING PROGRAM
Payer: MEDICARE

## 2023-08-10 ENCOUNTER — PATIENT MESSAGE (OUTPATIENT)
Dept: FAMILY MEDICINE | Facility: CLINIC | Age: 58
End: 2023-08-10

## 2023-08-10 VITALS
BODY MASS INDEX: 26.28 KG/M2 | HEART RATE: 63 BPM | OXYGEN SATURATION: 96 % | DIASTOLIC BLOOD PRESSURE: 84 MMHG | SYSTOLIC BLOOD PRESSURE: 126 MMHG | HEIGHT: 63 IN | TEMPERATURE: 99 F | RESPIRATION RATE: 20 BRPM | WEIGHT: 148.31 LBS

## 2023-08-10 DIAGNOSIS — N30.01 ACUTE CYSTITIS WITH HEMATURIA: ICD-10-CM

## 2023-08-10 DIAGNOSIS — F41.9 ANXIETY: ICD-10-CM

## 2023-08-10 DIAGNOSIS — R30.0 DYSURIA: Primary | ICD-10-CM

## 2023-08-10 DIAGNOSIS — F41.8 SITUATIONAL ANXIETY: ICD-10-CM

## 2023-08-10 DIAGNOSIS — M25.552 PAIN OF LEFT HIP: ICD-10-CM

## 2023-08-10 LAB
BILIRUB SERPL-MCNC: NEGATIVE MG/DL
BLOOD URINE, POC: NORMAL
CLARITY, POC UA: CLEAR
COLOR, POC UA: NORMAL
GLUCOSE UR QL STRIP: NEGATIVE
KETONES UR QL STRIP: NEGATIVE
LEUKOCYTE ESTERASE URINE, POC: NORMAL
NITRITE, POC UA: NEGATIVE
PH, POC UA: 5.5
PROTEIN, POC: NEGATIVE
SPECIFIC GRAVITY, POC UA: 1.01
UROBILINOGEN, POC UA: 0.2

## 2023-08-10 PROCEDURE — 3079F DIAST BP 80-89 MM HG: CPT | Mod: CPTII,,, | Performed by: STUDENT IN AN ORGANIZED HEALTH CARE EDUCATION/TRAINING PROGRAM

## 2023-08-10 PROCEDURE — 1159F MED LIST DOCD IN RCRD: CPT | Mod: CPTII,,, | Performed by: STUDENT IN AN ORGANIZED HEALTH CARE EDUCATION/TRAINING PROGRAM

## 2023-08-10 PROCEDURE — 87088 URINE BACTERIA CULTURE: CPT | Performed by: STUDENT IN AN ORGANIZED HEALTH CARE EDUCATION/TRAINING PROGRAM

## 2023-08-10 PROCEDURE — 3079F PR MOST RECENT DIASTOLIC BLOOD PRESSURE 80-89 MM HG: ICD-10-PCS | Mod: CPTII,,, | Performed by: STUDENT IN AN ORGANIZED HEALTH CARE EDUCATION/TRAINING PROGRAM

## 2023-08-10 PROCEDURE — 73502 X-RAY EXAM HIP UNI 2-3 VIEWS: CPT | Mod: TC,PN,LT

## 2023-08-10 PROCEDURE — 81002 URINALYSIS NONAUTO W/O SCOPE: CPT | Mod: PBBFAC,PN | Performed by: STUDENT IN AN ORGANIZED HEALTH CARE EDUCATION/TRAINING PROGRAM

## 2023-08-10 PROCEDURE — 3074F SYST BP LT 130 MM HG: CPT | Mod: CPTII,,, | Performed by: STUDENT IN AN ORGANIZED HEALTH CARE EDUCATION/TRAINING PROGRAM

## 2023-08-10 PROCEDURE — 1159F PR MEDICATION LIST DOCUMENTED IN MEDICAL RECORD: ICD-10-PCS | Mod: CPTII,,, | Performed by: STUDENT IN AN ORGANIZED HEALTH CARE EDUCATION/TRAINING PROGRAM

## 2023-08-10 PROCEDURE — 3074F PR MOST RECENT SYSTOLIC BLOOD PRESSURE < 130 MM HG: ICD-10-PCS | Mod: CPTII,,, | Performed by: STUDENT IN AN ORGANIZED HEALTH CARE EDUCATION/TRAINING PROGRAM

## 2023-08-10 PROCEDURE — 99214 OFFICE O/P EST MOD 30 MIN: CPT | Mod: PBBFAC,PN | Performed by: STUDENT IN AN ORGANIZED HEALTH CARE EDUCATION/TRAINING PROGRAM

## 2023-08-10 PROCEDURE — 99214 PR OFFICE/OUTPT VISIT, EST, LEVL IV, 30-39 MIN: ICD-10-PCS | Mod: S$PBB,,, | Performed by: STUDENT IN AN ORGANIZED HEALTH CARE EDUCATION/TRAINING PROGRAM

## 2023-08-10 PROCEDURE — 99214 OFFICE O/P EST MOD 30 MIN: CPT | Mod: S$PBB,,, | Performed by: STUDENT IN AN ORGANIZED HEALTH CARE EDUCATION/TRAINING PROGRAM

## 2023-08-10 PROCEDURE — 3008F BODY MASS INDEX DOCD: CPT | Mod: CPTII,,, | Performed by: STUDENT IN AN ORGANIZED HEALTH CARE EDUCATION/TRAINING PROGRAM

## 2023-08-10 PROCEDURE — 3008F PR BODY MASS INDEX (BMI) DOCUMENTED: ICD-10-PCS | Mod: CPTII,,, | Performed by: STUDENT IN AN ORGANIZED HEALTH CARE EDUCATION/TRAINING PROGRAM

## 2023-08-10 RX ORDER — ALPRAZOLAM 0.25 MG/1
TABLET ORAL
Qty: 10 TABLET | Refills: 0 | Status: SHIPPED | OUTPATIENT
Start: 2023-08-10

## 2023-08-10 RX ORDER — NITROFURANTOIN 25; 75 MG/1; MG/1
100 CAPSULE ORAL 2 TIMES DAILY
Qty: 10 CAPSULE | Refills: 0 | Status: SHIPPED | OUTPATIENT
Start: 2023-08-10 | End: 2023-08-15

## 2023-08-11 PROBLEM — F41.8 SITUATIONAL ANXIETY: Status: ACTIVE | Noted: 2023-08-11

## 2023-08-11 PROBLEM — N30.01 ACUTE CYSTITIS WITH HEMATURIA: Status: ACTIVE | Noted: 2023-08-11

## 2023-08-11 NOTE — ASSESSMENT & PLAN NOTE
Discussed with patient results of urine dip sending in Macrobid and sending urine for culture    Return to clinic precautions in ED precautions given

## 2023-08-11 NOTE — ASSESSMENT & PLAN NOTE
Medication adverse effect discussion with patient on Xanax   Patient reports that she has taken this medication previously   Medication refill given for plane trip in September 2023

## 2023-08-11 NOTE — PROGRESS NOTES
Patient Name: Chloé Willams     : 1965    MRN: [id number]     Subjective:     Patient ID: Chloé Willams is a 58 y.o. female.    Chief Complaint:   Chief Complaint   Patient presents with    Follow-up     Requesting , needs some anxiety meds , burning  upon urination x1 week         HPI: HPI  Acute issue  Is requesting anxiety medication for an up coming trip   Also complaining of burning upon urination    At the end of the visit patient mentioned that her hip was hurting and asked for a x-ray of her hip denies    Back Pain  8/10 Dull and aching and sharp at times.   See  Dr. Anabel MONTIEL/CeNp  Continues with right hand nodules and pain   Is on Plaquenil      Depression/Anxiety   Celexa 20 mg .  Patient reports that this is doing helping and would like to continue on dose at this time.     Seizures  Takes Keppra 500 mg BID  Has been referred to neurology          HTN  Amlodipine 10 mg     Insomnia  Remeron 15 mg for sleep and appetite    GERD/Constipation/Decreased appetite  Protonix 40 mg      HLD/CVA  Atorvastatin 40 mg  Plavix 75 mg       HM Declines vaccines   ROS:      ROS     12 point review of systems conducted, negative except as stated in the history of present illness. See HPI for details.    History:     Past Medical History:   Diagnosis Date    Anxiety disorder, unspecified     Aphasia 10/26/2017    Hyperlipidemia     Hypertension     Seizures         Past Surgical History:   Procedure Laterality Date    KALEIGH Leg Stents        SECTION      TUBAL LIGATION         Family History   Problem Relation Age of Onset    Cancer Mother     Heart attack Father         Social History     Tobacco Use    Smoking status: Every Day     Current packs/day: 0.50     Average packs/day: 0.5 packs/day for 15.0 years (7.5 ttl pk-yrs)     Types: Cigarettes     Passive exposure: Never    Smokeless tobacco: Never    Tobacco comments:     stop on own   Substance and Sexual Activity    Alcohol use: Not Currently  "   Drug use: Never    Sexual activity: Not Currently     Partners: Male     Birth control/protection: See Surgical Hx       Current Outpatient Medications   Medication Instructions    ALPRAZolam (XANAX) 0.25 MG tablet Take one tablet 60 minutes before flight. May repeat 15 minutes prior to flight    amLODIPine (NORVASC) 10 mg, Oral, Daily    aspirin (ECOTRIN) 81 mg, Oral    atorvastatin (LIPITOR) 40 mg, Oral, Daily    citalopram (CELEXA) 20 mg, Oral, Daily    clopidogreL (PLAVIX) 75 mg, Oral, Daily    conjugated estrogens (PREMARIN) 1 g, Vaginal, Daily, Apply nightly for 2 weeks, and then apply twice/week    folic acid (FOLVITE) 1 mg, Oral, Daily, After food    hydrOXYchloroQUINE (PLAQUENIL) 200 mg, Oral, 2 times daily, After food    levETIRAcetam (KEPPRA) 500 mg, Oral, 2 times daily    linaCLOtide (LINZESS) 290 mcg, Oral, Before breakfast    methocarbamoL (ROBAXIN) 750 MG Tab TAKE 1 TABLET BY MOUTH EVERY 8 HOURS AS NEEDED FOR MUSCLE SPASMS    methotrexate 10 mg, Oral, Every 7 days, EVERY  FRIDAY      TAKE 4 TAB TOGETHER AFTER SUPPER    mirtazapine (REMERON) 15 mg, Oral, Nightly    nitrofurantoin, macrocrystal-monohydrate, (MACROBID) 100 MG capsule 100 mg, Oral, 2 times daily    oxybutynin (DITROPAN-XL) 5 mg, Oral, Daily    pantoprazole (PROTONIX) 40 mg, Oral, Daily    tiZANidine (ZANAFLEX) 8 mg, Oral, Nightly        Review of patient's allergies indicates:  No Known Allergies    Objective:     Visit Vitals  /84 (BP Location: Right arm, Patient Position: Sitting, BP Method: Medium (Automatic))   Pulse 63   Temp 98.5 °F (36.9 °C)   Resp 20   Ht 5' 3" (1.6 m)   Wt 67.3 kg (148 lb 4.8 oz)   LMP  (LMP Unknown)   SpO2 96%   BMI 26.27 kg/m²       Physical Examination:     Physical Exam  Constitutional:       General: She is not in acute distress.  Eyes:      General: No scleral icterus.     Extraocular Movements: Extraocular movements intact.      Conjunctiva/sclera: Conjunctivae normal.      Pupils: Pupils are " equal, round, and reactive to light.   Cardiovascular:      Rate and Rhythm: Normal rate and regular rhythm.      Heart sounds: No murmur heard.  Pulmonary:      Effort: Pulmonary effort is normal. No respiratory distress.      Breath sounds: No stridor. No wheezing or rhonchi.   Abdominal:      General: Bowel sounds are normal. There is no distension.      Palpations: Abdomen is soft.      Tenderness: There is no abdominal tenderness. There is no guarding.   Musculoskeletal:         General: No swelling. Normal range of motion.   Skin:     General: Skin is warm and dry.      Coloration: Skin is not jaundiced.      Findings: No rash.   Neurological:      Mental Status: She is alert.      Gait: Gait normal.   Psychiatric:         Thought Content: Thought content normal.         Lab Results:     Chemistry:  Lab Results   Component Value Date     12/13/2022    K 4.2 12/13/2022    CHLORIDE 106 12/13/2022    BUN 7.6 (L) 12/13/2022    CREATININE 0.99 12/13/2022    EGFRNORACEVR >60 12/13/2022    GLUCOSE 84 12/13/2022    CALCIUM 9.8 12/13/2022    ALKPHOS 83 12/13/2022    LABPROT 7.1 12/13/2022    ALBUMIN 4.3 12/13/2022    BILIDIR 0.2 03/13/2022    IBILI 0.30 03/13/2022    AST 23 12/13/2022    ALT 18 12/13/2022    MG 2.20 01/23/2022    PHOS 2.9 05/19/2018    HVSNUYNA94VH 20.4 (L) 12/13/2022    TSH 1.6103 12/13/2022    SWOPUR8HVWG 0.95 12/13/2022        Lab Results   Component Value Date    HGBA1C 5.1 11/02/2022        Hematology:  Lab Results   Component Value Date    WBC 5.2 12/13/2022    HGB 15.0 12/13/2022    HCT 45.8 12/13/2022     12/13/2022       Lipid Panel:  Lab Results   Component Value Date    CHOL 110 05/19/2018    HDL 42 05/19/2018    LDL 56 05/19/2018    TRIG 60 05/19/2018    TOTALCHOLEST 2.6 05/19/2018        Urine:  Lab Results   Component Value Date    COLORUA Light-Yellow 02/16/2023    APPEARANCEUA Clear 02/16/2023    SGUA 1.015 02/16/2023    PHUA 5.5 02/16/2023    PROTEINUA Negative 02/16/2023     GLUCOSEUA Normal 02/16/2023    KETONESUA Negative 02/16/2023    BLOODUA Negative 02/16/2023    NITRITESUA Negative 02/16/2023    LEUKOCYTESUR 75 (A) 02/16/2023    RBCUA 0-5 02/16/2023    WBCUA 0-5 02/16/2023    BACTERIA Trace (A) 02/16/2023    SQEPUA Few (A) 02/16/2023    HYALINECASTS None Seen 02/16/2023        Assessment:          ICD-10-CM ICD-9-CM   1. Dysuria  R30.0 788.1   2. Anxiety  F41.9 300.00   3. Pain of left hip  M25.552 719.45   4. Acute cystitis with hematuria  N30.01 595.0   5. Situational anxiety  F41.8 300.09        Plan:     1. Dysuria  -     POCT URINE DIPSTICK WITHOUT MICROSCOPE    2. Anxiety  -     ALPRAZolam (XANAX) 0.25 MG tablet; Take one tablet 60 minutes before flight. May repeat 15 minutes prior to flight  Dispense: 10 tablet; Refill: 0    3. Pain of left hip  -     X-Ray Hip 2 or 3 views Left (with Pelvis when performed); Future; Expected date: 08/10/2023    4. Acute cystitis with hematuria  Assessment & Plan:  Discussed with patient results of urine dip sending in Macrobid and sending urine for culture    Return to clinic precautions in ED precautions given    Orders:  -     nitrofurantoin, macrocrystal-monohydrate, (MACROBID) 100 MG capsule; Take 1 capsule (100 mg total) by mouth 2 (two) times daily. for 5 days  Dispense: 10 capsule; Refill: 0  -     Urine culture    5. Situational anxiety  Assessment & Plan:  Medication adverse effect discussion with patient on Xanax   Patient reports that she has taken this medication previously   Medication refill given for plane trip in September 2023           Follow up if symptoms worsen or fail to improve.    Future Appointments   Date Time Provider Department Center   9/6/2023  1:40 PM Criselda Diaz FNP [facility]    11/2/2023  9:15 AM Jayce Espinal MD [facility]        Diana Berry MD

## 2023-08-12 LAB — BACTERIA UR CULT: NO GROWTH

## 2023-08-31 ENCOUNTER — HOSPITAL ENCOUNTER (EMERGENCY)
Facility: HOSPITAL | Age: 58
Discharge: HOME | End: 2023-08-31
Attending: INTERNAL MEDICINE
Payer: MEDICARE

## 2023-08-31 VITALS
SYSTOLIC BLOOD PRESSURE: 143 MMHG | BODY MASS INDEX: 25.25 KG/M2 | RESPIRATION RATE: 17 BRPM | HEIGHT: 64 IN | TEMPERATURE: 98 F | WEIGHT: 147.94 LBS | HEART RATE: 69 BPM | OXYGEN SATURATION: 99 % | DIASTOLIC BLOOD PRESSURE: 81 MMHG

## 2023-08-31 DIAGNOSIS — M62.838 NECK MUSCLE SPASM: ICD-10-CM

## 2023-08-31 DIAGNOSIS — S16.1XXA STRAIN OF NECK MUSCLE, INITIAL ENCOUNTER: Primary | ICD-10-CM

## 2023-08-31 DIAGNOSIS — M54.2 NECK PAIN: ICD-10-CM

## 2023-08-31 PROCEDURE — 63600175 PHARM REV CODE 636 W HCPCS: Performed by: INTERNAL MEDICINE

## 2023-08-31 PROCEDURE — 96372 THER/PROPH/DIAG INJ SC/IM: CPT | Performed by: INTERNAL MEDICINE

## 2023-08-31 PROCEDURE — 99284 EMERGENCY DEPT VISIT MOD MDM: CPT

## 2023-08-31 RX ORDER — KETOROLAC TROMETHAMINE 30 MG/ML
60 INJECTION, SOLUTION INTRAMUSCULAR; INTRAVENOUS
Status: COMPLETED | OUTPATIENT
Start: 2023-08-31 | End: 2023-08-31

## 2023-08-31 RX ADMIN — KETOROLAC TROMETHAMINE 60 MG: 60 INJECTION, SOLUTION INTRAMUSCULAR at 03:08

## 2023-08-31 NOTE — ED PROVIDER NOTES
Encounter Date: 2023       History     Chief Complaint   Patient presents with    Neck Pain     Pt c/o r side neck pain radiating to shoulder and stopping at the elbow x a few weeks.     58-year-old black female with fibromyalgia and chronic pain syndrome presents with pain in the right side of her neck going down towards the shoulder down the back of her arm.  Patient admits that she went to urgent care 2 days ago was given muscle relaxers but this did not get rid of it so she came to the emergency department in the middle the night.  I asked her if she saw her primary care and she said no because they are in [place]      Review of patient's allergies indicates:  No Known Allergies  Past Medical History:   Diagnosis Date    Anxiety disorder, unspecified     Aphasia 10/26/2017    Hyperlipidemia     Hypertension     Seizures      Past Surgical History:   Procedure Laterality Date    KALEIGH Leg Stents        SECTION      TUBAL LIGATION       Family History   Problem Relation Age of Onset    Cancer Mother     Heart attack Father      Social History     Tobacco Use    Smoking status: Every Day     Current packs/day: 0.50     Average packs/day: 0.5 packs/day for 15.0 years (7.5 ttl pk-yrs)     Types: Cigarettes     Passive exposure: Never    Smokeless tobacco: Never    Tobacco comments:     stop on own   Substance Use Topics    Alcohol use: Not Currently    Drug use: Never     Review of Systems   Constitutional: Negative.  Negative for activity change, appetite change, chills, diaphoresis, fatigue, fever and unexpected weight change.   HENT: Negative.  Negative for congestion, dental problem, drooling, ear discharge, ear pain, facial swelling, hearing loss, mouth sores, nosebleeds, postnasal drip, rhinorrhea, sinus pressure, sinus pain, sneezing, sore throat, tinnitus, trouble swallowing and voice change.    Eyes: Negative.  Negative for photophobia, pain, discharge, redness, itching and visual disturbance.    Respiratory: Negative.  Negative for apnea, cough, choking, chest tightness, shortness of breath, wheezing and stridor.    Cardiovascular: Negative.  Negative for chest pain, palpitations and leg swelling.   Gastrointestinal: Negative.  Negative for abdominal distention, abdominal pain, anal bleeding, blood in stool, constipation, diarrhea, nausea, rectal pain and vomiting.   Endocrine: Negative.  Negative for cold intolerance, heat intolerance, polydipsia, polyphagia and polyuria.   Genitourinary: Negative.  Negative for decreased urine volume, difficulty urinating, dyspareunia, dysuria, enuresis, flank pain, frequency, genital sores, hematuria, menstrual problem, pelvic pain, urgency, vaginal bleeding, vaginal discharge and vaginal pain.   Musculoskeletal:  Positive for neck pain. Negative for arthralgias, back pain, gait problem, joint swelling, myalgias and neck stiffness.   Skin: Negative.  Negative for color change, pallor, rash and wound.   Allergic/Immunologic: Negative.  Negative for environmental allergies, food allergies and immunocompromised state.   Neurological: Negative.  Negative for dizziness, tremors, seizures, syncope, facial asymmetry, speech difficulty, weakness, light-headedness, numbness and headaches.   Hematological: Negative.  Negative for adenopathy. Does not bruise/bleed easily.   Psychiatric/Behavioral: Negative.  Negative for agitation, behavioral problems, confusion, decreased concentration, dysphoric mood, hallucinations, self-injury, sleep disturbance and suicidal ideas. The patient is not nervous/anxious and is not hyperactive.    All other systems reviewed and are negative.      Physical Exam     Initial Vitals [08/31/23 0232]   BP Pulse Resp Temp SpO2   (!) 152/82 73 20 98.1 °F (36.7 °C) 98 %      MAP       --         Physical Exam    Nursing note and vitals reviewed.  Constitutional: She appears well-developed and well-nourished. She is not diaphoretic. No distress.   HENT:    Head: Normocephalic and atraumatic.   Mouth/Throat: Oropharynx is clear and moist. No oropharyngeal exudate.   Eyes: Conjunctivae and EOM are normal. Pupils are equal, round, and reactive to light. No scleral icterus.   Neck: Neck supple. No JVD present.       Normal range of motion.  Cardiovascular:  Normal rate, regular rhythm, normal heart sounds and intact distal pulses.     Exam reveals no gallop and no friction rub.       No murmur heard.  Pulmonary/Chest: Breath sounds normal. No respiratory distress. She has no wheezes. She exhibits no tenderness.   Abdominal: Abdomen is soft. Bowel sounds are normal. She exhibits no distension. There is no abdominal tenderness. There is no rebound.   Musculoskeletal:         General: Normal range of motion.      Cervical back: Normal range of motion and neck supple.     Neurological: She is alert and oriented to person, place, and time. She has normal strength.   Skin: Skin is warm and dry. Capillary refill takes less than 2 seconds.   Psychiatric: She has a normal mood and affect. Her behavior is normal. Judgment and thought content normal.         ED Course   Procedures  Labs Reviewed - No data to display       Imaging Results              X-Ray Cervical Spine AP And Lateral (Preliminary result)  Result time 08/31/23 02:59:55      Wet Read by Margarito Gonzales MD (08/31/23 02:59:55, [facility] - Emergency Dept, Emergency Medicine)    Loss of cervical lordosis, chronic degenerative change noted but no acute fractures or subluxations                                     Medications   ketorolac injection 60 mg (has no administration in time range)     Medical Decision Making  58-year-old black female with right-sided neck pain.  Patient turns her neck the entire time stating her neck hurts.  X-ray was done which shows loss of cervical lordosis and physical exam is consistent with the neck muscle strain.  Discussed with her to follow up with the primary care  and get referred to physical therapy will give a shot of Toradol here prior to discharge she still has muscle relaxants from an urgent care visit 2 days ago    Problems Addressed:  Neck muscle spasm: acute illness or injury  Neck pain: acute illness or injury  Strain of neck muscle, initial encounter: acute illness or injury    Amount and/or Complexity of Data Reviewed  External Data Reviewed: radiology and notes.  Radiology: ordered and independent interpretation performed. Decision-making details documented in ED Course.    Risk  OTC drugs.  Prescription drug management.  Drug therapy requiring intensive monitoring for toxicity.  Diagnosis or treatment significantly limited by social determinants of health.                               Clinical Impression:   Final diagnoses:  [M54.2] Neck pain  [S16.1XXA] Strain of neck muscle, initial encounter (Primary)  [M62.838] Neck muscle spasm        ED Disposition Condition    Discharge Stable          ED Prescriptions    None       Follow-up Information       Follow up With Specialties Details Why Contact Info    Diana Berry MD Family Medicine In 2 days  [address] [place] [address] [id number] [phone]            Sharyn Le is a certified MA and was present during the entire interaction with this patient      Margarito Gonzales MD  08/31/23 1095

## 2023-09-06 ENCOUNTER — TELEPHONE (OUTPATIENT)
Dept: GYNECOLOGY | Facility: CLINIC | Age: 58
End: 2023-09-06
Payer: MEDICARE

## 2023-09-06 NOTE — TELEPHONE ENCOUNTER
Calling to inform pt that her appointment on today will be rescheduled due to having an annual exam last year on 10/26/22 and for insurance purposes and coverage annuals has to be scheduled a year and a day out.    Pt did not answer-left a detailed voice message to contact the clinic at [phone].

## 2023-09-06 NOTE — TELEPHONE ENCOUNTER
Informed pt that her appointment on today has been rescheduled due to having an annual with our GYN team on 10/26/22 and for insurance purposes and coverage annual exams has to be scheduled a year and a day out. Pt new appointment is scheduled for 4/24/24 at 9:50.    Pt verbalized understanding and had no further questions or concerns at this time.

## 2023-09-28 ENCOUNTER — OFFICE VISIT (OUTPATIENT)
Dept: FAMILY MEDICINE | Facility: CLINIC | Age: 58
End: 2023-09-28
Payer: MEDICARE

## 2023-09-28 VITALS
HEIGHT: 63 IN | SYSTOLIC BLOOD PRESSURE: 122 MMHG | HEART RATE: 61 BPM | BODY MASS INDEX: 26.37 KG/M2 | DIASTOLIC BLOOD PRESSURE: 85 MMHG | RESPIRATION RATE: 20 BRPM | TEMPERATURE: 97 F | WEIGHT: 148.81 LBS

## 2023-09-28 DIAGNOSIS — M25.511 ACUTE PAIN OF RIGHT SHOULDER: Primary | ICD-10-CM

## 2023-09-28 DIAGNOSIS — M54.12 CERVICAL RADICULOPATHY: ICD-10-CM

## 2023-09-28 DIAGNOSIS — M50.30 DEGENERATIVE, INTERVERTEBRAL DISC, CERVICAL: ICD-10-CM

## 2023-09-28 DIAGNOSIS — D84.821 DRUG-INDUCED IMMUNODEFICIENCY: ICD-10-CM

## 2023-09-28 DIAGNOSIS — Z79.899 DRUG-INDUCED IMMUNODEFICIENCY: ICD-10-CM

## 2023-09-28 PROCEDURE — 3074F PR MOST RECENT SYSTOLIC BLOOD PRESSURE < 130 MM HG: ICD-10-PCS | Mod: CPTII,,, | Performed by: STUDENT IN AN ORGANIZED HEALTH CARE EDUCATION/TRAINING PROGRAM

## 2023-09-28 PROCEDURE — 99214 PR OFFICE/OUTPT VISIT, EST, LEVL IV, 30-39 MIN: ICD-10-PCS | Mod: S$PBB,,, | Performed by: STUDENT IN AN ORGANIZED HEALTH CARE EDUCATION/TRAINING PROGRAM

## 2023-09-28 PROCEDURE — 96372 THER/PROPH/DIAG INJ SC/IM: CPT | Mod: PBBFAC,PN

## 2023-09-28 PROCEDURE — 99214 OFFICE O/P EST MOD 30 MIN: CPT | Mod: 25,PBBFAC,PN | Performed by: STUDENT IN AN ORGANIZED HEALTH CARE EDUCATION/TRAINING PROGRAM

## 2023-09-28 PROCEDURE — 3079F PR MOST RECENT DIASTOLIC BLOOD PRESSURE 80-89 MM HG: ICD-10-PCS | Mod: CPTII,,, | Performed by: STUDENT IN AN ORGANIZED HEALTH CARE EDUCATION/TRAINING PROGRAM

## 2023-09-28 PROCEDURE — 3008F PR BODY MASS INDEX (BMI) DOCUMENTED: ICD-10-PCS | Mod: CPTII,,, | Performed by: STUDENT IN AN ORGANIZED HEALTH CARE EDUCATION/TRAINING PROGRAM

## 2023-09-28 PROCEDURE — 3079F DIAST BP 80-89 MM HG: CPT | Mod: CPTII,,, | Performed by: STUDENT IN AN ORGANIZED HEALTH CARE EDUCATION/TRAINING PROGRAM

## 2023-09-28 PROCEDURE — 1159F MED LIST DOCD IN RCRD: CPT | Mod: CPTII,,, | Performed by: STUDENT IN AN ORGANIZED HEALTH CARE EDUCATION/TRAINING PROGRAM

## 2023-09-28 PROCEDURE — 99214 OFFICE O/P EST MOD 30 MIN: CPT | Mod: S$PBB,,, | Performed by: STUDENT IN AN ORGANIZED HEALTH CARE EDUCATION/TRAINING PROGRAM

## 2023-09-28 PROCEDURE — 3008F BODY MASS INDEX DOCD: CPT | Mod: CPTII,,, | Performed by: STUDENT IN AN ORGANIZED HEALTH CARE EDUCATION/TRAINING PROGRAM

## 2023-09-28 PROCEDURE — 1159F PR MEDICATION LIST DOCUMENTED IN MEDICAL RECORD: ICD-10-PCS | Mod: CPTII,,, | Performed by: STUDENT IN AN ORGANIZED HEALTH CARE EDUCATION/TRAINING PROGRAM

## 2023-09-28 PROCEDURE — 3074F SYST BP LT 130 MM HG: CPT | Mod: CPTII,,, | Performed by: STUDENT IN AN ORGANIZED HEALTH CARE EDUCATION/TRAINING PROGRAM

## 2023-09-28 RX ORDER — CYCLOBENZAPRINE HCL 10 MG
10 TABLET ORAL 3 TIMES DAILY PRN
Qty: 90 TABLET | Refills: 6 | Status: SHIPPED | OUTPATIENT
Start: 2023-09-28 | End: 2024-04-25

## 2023-09-28 RX ORDER — DEXAMETHASONE SODIUM PHOSPHATE 4 MG/ML
4 INJECTION, SOLUTION INTRA-ARTICULAR; INTRALESIONAL; INTRAMUSCULAR; INTRAVENOUS; SOFT TISSUE
Status: COMPLETED | OUTPATIENT
Start: 2023-09-28 | End: 2023-09-28

## 2023-09-28 RX ORDER — GABAPENTIN 300 MG/1
300 CAPSULE ORAL 3 TIMES DAILY
Qty: 90 CAPSULE | Refills: 11 | Status: SHIPPED | OUTPATIENT
Start: 2023-09-28 | End: 2024-09-27

## 2023-09-28 RX ADMIN — DEXAMETHASONE SODIUM PHOSPHATE 4 MG: 4 INJECTION, SOLUTION INTRA-ARTICULAR; INTRALESIONAL; INTRAMUSCULAR; INTRAVENOUS; SOFT TISSUE at 08:09

## 2023-09-28 NOTE — ASSESSMENT & PLAN NOTE
Reviewed x-rays with patient showing degenerative disc disease in her neck with osteophyte formation   Patient elects to try dexamethasone injection for relief of pain   Also elects to try Flexeril 10 mg t.i.d.   Gabapentin 300 mg t.i.d. for pain   Discussed conservative treatment options with patient including physical therapy  Patient states she will call the back of her insurance card and find physical therapy places in her home sitting  Referral will be placed at that time  Also discussed that in the future after conservative treatment we may refer to neurosurgery

## 2023-09-28 NOTE — PROGRESS NOTES
Patient Name: Chloé Willams     : 1965    MRN: [id number]     Subjective:     Patient ID: Chloé Willams is a 58 y.o. female.    Chief Complaint:   Chief Complaint   Patient presents with    Follow-up     C/o rt shoulder pain, sp 7.5 x3 wks.        HPI: HPI    58-year-old female presents to clinic for acute complaint of shoulder pain right-sided pain is 7.5/10 for the past 3 weeks  Patient did go to the ER for x-rays of her neck patient did not know the results of the x-ray reports that her right arm tingles at times with shooting pains down her arm reports that she has previously been on gabapentin which did help relieve  Reports tizanidine did not help relieve pain    Back Pain  8/10 Dull and aching and sharp at times.   See  Dr. Little   Is on Plaquenil  DINESH/CeNp  Continues with right hand nodules and pain   Is on Plaquenil      Depression/Anxiety   Celexa 20 mg .  Patient reports that this is doing helping and would like to continue on dose at this time.     Seizures  Takes Keppra 500 mg BID  Has been referred to neurology          HTN  Amlodipine 10 mg     Insomnia  Remeron 15 mg for sleep and appetite    GERD/Constipation/Decreased appetite  Protonix 40 mg      HLD/CVA  Atorvastatin 40 mg  Plavix 75 mg     ROS:      ROS     12 point review of systems conducted, negative except as stated in the history of present illness. See HPI for details.    History:     Past Medical History:   Diagnosis Date    Anxiety disorder, unspecified     Aphasia 10/26/2017    Hyperlipidemia     Hypertension     Seizures         Past Surgical History:   Procedure Laterality Date    KALEIGH Leg Stents        SECTION      TUBAL LIGATION         Family History   Problem Relation Age of Onset    Cancer Mother     Heart attack Father         Social History     Tobacco Use    Smoking status: Every Day     Current packs/day: 0.50     Average packs/day: 0.5 packs/day for 15.0 years (7.5 ttl pk-yrs)     Types: Cigarettes      "Passive exposure: Never    Smokeless tobacco: Never    Tobacco comments:     stop on own   Substance and Sexual Activity    Alcohol use: Not Currently    Drug use: Never    Sexual activity: Not Currently     Partners: Male     Birth control/protection: See Surgical Hx       Current Outpatient Medications   Medication Instructions    ALPRAZolam (XANAX) 0.25 MG tablet Take one tablet 60 minutes before flight. May repeat 15 minutes prior to flight    amLODIPine (NORVASC) 10 mg, Oral, Daily    aspirin (ECOTRIN) 81 mg, Oral    atorvastatin (LIPITOR) 40 mg, Oral, Daily    citalopram (CELEXA) 20 mg, Oral, Daily    clopidogreL (PLAVIX) 75 mg, Oral, Daily    conjugated estrogens (PREMARIN) 1 g, Vaginal, Daily, Apply nightly for 2 weeks, and then apply twice/week    cyclobenzaprine (FLEXERIL) 10 mg, Oral, 3 times daily PRN    folic acid (FOLVITE) 1 mg, Oral, Daily, After food    gabapentin (NEURONTIN) 300 mg, Oral, 3 times daily    hydroxychloroquine (PLAQUENIL) 200 mg, Oral, 2 times daily, After food    levETIRAcetam (KEPPRA) 500 mg, Oral, 2 times daily    linaCLOtide (LINZESS) 290 mcg, Oral, Before breakfast    methotrexate 10 mg, Oral, Every 7 days, EVERY  FRIDAY      TAKE 4 TAB TOGETHER AFTER SUPPER    mirtazapine (REMERON) 15 mg, Oral, Nightly    oxybutynin (DITROPAN-XL) 5 mg, Oral, Daily    pantoprazole (PROTONIX) 40 mg, Oral, Daily        Review of patient's allergies indicates:  No Known Allergies    Objective:     Visit Vitals  /85 (BP Location: Right arm, Patient Position: Sitting, BP Method: Medium (Automatic))   Pulse 61   Temp 97.2 °F (36.2 °C) (Oral)   Resp 20   Ht 5' 3" (1.6 m)   Wt 67.5 kg (148 lb 12.8 oz)   LMP  (LMP Unknown)   BMI 26.36 kg/m²       Physical Examination:     Physical Exam  Constitutional:       General: She is not in acute distress.  Eyes:      General: No scleral icterus.     Extraocular Movements: Extraocular movements intact.      Conjunctiva/sclera: Conjunctivae normal.      Pupils: " Pupils are equal, round, and reactive to light.   Cardiovascular:      Rate and Rhythm: Normal rate and regular rhythm.      Heart sounds: No murmur heard.  Pulmonary:      Effort: Pulmonary effort is normal. No respiratory distress.      Breath sounds: No stridor. No wheezing or rhonchi.   Abdominal:      General: Bowel sounds are normal. There is no distension.      Palpations: Abdomen is soft.      Tenderness: There is no abdominal tenderness. There is no guarding.   Musculoskeletal:         General: Tenderness present. No swelling. Normal range of motion.      Comments: Musculature of trapezius is tight on the right side with focal point tenderness   Skin:     General: Skin is warm and dry.      Coloration: Skin is not jaundiced.      Findings: No rash.   Neurological:      Mental Status: She is alert.      Gait: Gait normal.   Psychiatric:         Thought Content: Thought content normal.         Lab Results:     Chemistry:  Lab Results   Component Value Date     12/13/2022    K 4.2 12/13/2022    CHLORIDE 106 12/13/2022    BUN 7.6 (L) 12/13/2022    CREATININE 0.99 12/13/2022    EGFRNORACEVR >60 12/13/2022    GLUCOSE 84 12/13/2022    CALCIUM 9.8 12/13/2022    ALKPHOS 83 12/13/2022    LABPROT 7.1 12/13/2022    ALBUMIN 4.3 12/13/2022    BILIDIR 0.2 03/13/2022    IBILI 0.30 03/13/2022    AST 23 12/13/2022    ALT 18 12/13/2022    MG 2.20 01/23/2022    PHOS 2.9 05/19/2018    MVNGURAF21MT 20.4 (L) 12/13/2022    TSH 1.6103 12/13/2022    EWKBFH6QQRM 0.95 12/13/2022        Lab Results   Component Value Date    HGBA1C 5.1 11/02/2022        Hematology:  Lab Results   Component Value Date    WBC 5.2 12/13/2022    HGB 15.0 12/13/2022    HCT 45.8 12/13/2022     12/13/2022       Lipid Panel:  Lab Results   Component Value Date    CHOL 110 05/19/2018    HDL 42 05/19/2018    LDL 56 05/19/2018    TRIG 60 05/19/2018    TOTALCHOLEST 2.6 05/19/2018        Urine:  Lab Results   Component Value Date    COLORUA Light-Yellow  02/16/2023    APPEARANCEUA Clear 02/16/2023    SGUA 1.015 02/16/2023    PHUA 5.5 02/16/2023    PROTEINUA Negative 02/16/2023    GLUCOSEUA Normal 02/16/2023    KETONESUA Negative 02/16/2023    BLOODUA Negative 02/16/2023    NITRITESUA Negative 02/16/2023    LEUKOCYTESUR 75 (A) 02/16/2023    RBCUA 0-5 02/16/2023    WBCUA 0-5 02/16/2023    BACTERIA Trace (A) 02/16/2023    SQEPUA Few (A) 02/16/2023    HYALINECASTS None Seen 02/16/2023        Assessment:          ICD-10-CM ICD-9-CM   1. Acute pain of right shoulder  M25.511 719.41   2. Cervical radiculopathy  M54.12 723.4   3. Drug-induced immunodeficiency  D84.821 279.3    Z79.899 E947.9   4. Degenerative, intervertebral disc, cervical  M50.30 722.4        Plan:     1. Acute pain of right shoulder  -     dexAMETHasone injection 4 mg    2. Cervical radiculopathy  -     gabapentin (NEURONTIN) 300 MG capsule; Take 1 capsule (300 mg total) by mouth 3 (three) times daily.  Dispense: 90 capsule; Refill: 11    3. Drug-induced immunodeficiency    4. Degenerative, intervertebral disc, cervical  Assessment & Plan:  Reviewed x-rays with patient showing degenerative disc disease in her neck with osteophyte formation   Patient elects to try dexamethasone injection for relief of pain   Also elects to try Flexeril 10 mg t.i.d.   Gabapentin 300 mg t.i.d. for pain   Discussed conservative treatment options with patient including physical therapy  Patient states she will call the back of her insurance card and find physical therapy places in her home sitting  Referral will be placed at that time  Also discussed that in the future after conservative treatment we may refer to neurosurgery      Other orders  -     cyclobenzaprine (FLEXERIL) 10 MG tablet; Take 1 tablet (10 mg total) by mouth 3 (three) times daily as needed for Muscle spasms.  Dispense: 90 tablet; Refill: 6         Follow up in about 1 month (around 10/28/2023) for Cervical Pain.    Future Appointments   Date Time Provider  Department Center   11/1/2023  2:00 PM Diana Berry MD [facility]   11/2/2023  9:15 AM Jayce Espinal MD [facility]   4/24/2024  9:50 AM Criselda Diaz, JORGE [facility] [place][note 2]        Diana Berry MD

## 2023-11-01 ENCOUNTER — OFFICE VISIT (OUTPATIENT)
Dept: FAMILY MEDICINE | Facility: CLINIC | Age: 58
End: 2023-11-01
Payer: MEDICARE

## 2023-11-01 DIAGNOSIS — M35.9 UNDIFFERENTIATED CONNECTIVE TISSUE DISEASE: ICD-10-CM

## 2023-11-01 DIAGNOSIS — Z86.010 HISTORY OF COLON POLYPS: ICD-10-CM

## 2023-11-01 DIAGNOSIS — M50.30 DEGENERATIVE, INTERVERTEBRAL DISC, CERVICAL: ICD-10-CM

## 2023-11-01 DIAGNOSIS — Z12.31 ENCOUNTER FOR SCREENING MAMMOGRAM FOR BREAST CANCER: Primary | ICD-10-CM

## 2023-11-01 DIAGNOSIS — M54.2 CERVICAL PAIN: ICD-10-CM

## 2023-11-01 PROBLEM — Z86.0100 HISTORY OF COLON POLYPS: Status: ACTIVE | Noted: 2023-11-01

## 2023-11-01 PROCEDURE — 99214 OFFICE O/P EST MOD 30 MIN: CPT | Mod: 95,,, | Performed by: STUDENT IN AN ORGANIZED HEALTH CARE EDUCATION/TRAINING PROGRAM

## 2023-11-01 PROCEDURE — 99214 PR OFFICE/OUTPT VISIT, EST, LEVL IV, 30-39 MIN: ICD-10-PCS | Mod: 95,,, | Performed by: STUDENT IN AN ORGANIZED HEALTH CARE EDUCATION/TRAINING PROGRAM

## 2023-11-01 RX ORDER — HYDROCODONE BITARTRATE AND ACETAMINOPHEN 5; 325 MG/1; MG/1
1 TABLET ORAL EVERY 6 HOURS PRN
Qty: 28 TABLET | Refills: 0 | Status: SHIPPED | OUTPATIENT
Start: 2023-11-01 | End: 2023-11-27 | Stop reason: SDUPTHER

## 2023-11-01 NOTE — PROGRESS NOTES
Audio Only Telehealth Visit     The patient location is:  [place]  The chief complaint leading to consultation is:  Cervical pain  Visit type: Virtual visit with audio only (telephone)  Total time spent with patient:  15 minutes     The reason for the audio only service rather than synchronous audio and video virtual visit was related to technical difficulties or patient preference/necessity.     Each patient to whom I provide medical services by telemedicine is:  (1) informed of the relationship between the physician and patient and the respective role of any other health care provider with respect to management of the patient; and (2) notified that they may decline to receive medical services by telemedicine and may withdraw from such care at any time. Patient verbally consented to receive this service via voice-only telephone call.    Patient Name: Chloé Willams   : 1965  MRN: [id number]     Subjective:   Patient ID: Chloé Willams is a 58 y.o. female.    Chief Complaint: No chief complaint on file.       HPI: HPI  58-year-old female   Presents via telemedicine for cervical pain   previously been on gabapentin which did help relieve  Reports tizanidine did not help relieve pain  Started last visit on Flexeril which did not help with pain    Back Pain  8/10 Dull and aching and sharp at times.   See  Dr. Little   Is on Plaquenil  DINESH/CeNp  Continues with right hand nodules and pain   Is on Plaquenil      Depression/Anxiety   Celexa 20 mg .  Patient reports that this is doing helping and would like to continue on dose at this time.     Seizures  Takes Keppra 500 mg BID  Has been referred to neurology          HTN  Amlodipine 10 mg     Insomnia  Remeron 15 mg for sleep and appetite    GERD/Constipation/Decreased appetite  Protonix 40 mg      HLD/CVA  Atorvastatin 40 mg  Plavix 75 mg       Patient is for a mammogram and also for a colonoscopy   Reports history of polyps and having previous  colonoscopies completed at [facility] for patient stressed use only when necessary ROS:  ROS   History:     Past Medical History:   Diagnosis Date    Anxiety disorder, unspecified     Aphasia 10/26/2017    Hyperlipidemia     Hypertension     Seizures       Past Surgical History:   Procedure Laterality Date    KALEIGH Leg Stents        SECTION      TUBAL LIGATION       Family History   Problem Relation Age of Onset    Cancer Mother     Heart attack Father       Social History     Tobacco Use    Smoking status: Every Day     Current packs/day: 0.50     Average packs/day: 0.5 packs/day for 15.0 years (7.5 ttl pk-yrs)     Types: Cigarettes     Passive exposure: Never    Smokeless tobacco: Never    Tobacco comments:     stop on own   Substance and Sexual Activity    Alcohol use: Not Currently    Drug use: Never    Sexual activity: Not Currently     Partners: Male     Birth control/protection: See Surgical Hx        Allergies: Review of patient's allergies indicates:  No Known Allergies  Objective:   There were no vitals filed for this visit.  There is no height or weight on file to calculate BMI.     Physical Examination:   Physical Exam    Assessment:     Problem List Items Addressed This Visit          Neuro    Degenerative, intervertebral disc, cervical    Relevant Medications    HYDROcodone-acetaminophen (NORCO) 5-325 mg per tablet       Immunology/Multi System    Undifferentiated connective tissue disease    Relevant Medications    HYDROcodone-acetaminophen (NORCO) 5-325 mg per tablet       GI    History of colon polyps    Current Assessment & Plan     Referring to Gastroenterology         Relevant Orders    Ambulatory referral/consult to Gastroenterology       Orthopedic    Cervical pain    Current Assessment & Plan     When patient not to take tramadol secondary to raising seizure threshold   Starting Norco 5 q.6 hours p.r.n. pain dispense 7 days of medication          Other Visit Diagnoses       Encounter for  screening mammogram for breast cancer    -  Primary    Relevant Orders    Mammo Digital Screening Bilat w/ Bright            Plan:   Diagnoses and all orders for this visit:    Encounter for screening mammogram for breast cancer  -     Mammo Digital Screening Bilat w/ Bright; Future    History of colon polyps  -     Ambulatory referral/consult to Gastroenterology; Future    Undifferentiated connective tissue disease  -     HYDROcodone-acetaminophen (NORCO) 5-325 mg per tablet; Take 1 tablet by mouth every 6 (six) hours as needed for Pain.    Degenerative, intervertebral disc, cervical  -     HYDROcodone-acetaminophen (NORCO) 5-325 mg per tablet; Take 1 tablet by mouth every 6 (six) hours as needed for Pain.    Cervical pain       Follow up in about 2 weeks (around 11/15/2023) for Virtual Visit, Cervical Pain.       This note was created with the assistance of a voice recognition software or phone dictation. There may be transcription errors as a result of using this technology however minimal. Effort has been made to assure accuracy of transcription but any obvious errors or omissions should be clarified with the author of the document      This service was not originating from a related E/M service provided within the previous 7 days nor will  to an E/M service or procedure within the next 24 hours or my soonest available appointment.  Prevailing standard of care was able to be met in this audio-only visit.

## 2023-11-01 NOTE — ASSESSMENT & PLAN NOTE
When patient not to take tramadol secondary to raising seizure threshold   Starting Norco 5 q.6 hours p.r.n. pain dispense 7 days of medication

## 2023-11-16 ENCOUNTER — HOSPITAL ENCOUNTER (OUTPATIENT)
Dept: RADIOLOGY | Facility: HOSPITAL | Age: 58
Discharge: HOME | End: 2023-11-16
Attending: STUDENT IN AN ORGANIZED HEALTH CARE EDUCATION/TRAINING PROGRAM
Payer: MEDICARE

## 2023-11-16 DIAGNOSIS — Z12.31 ENCOUNTER FOR SCREENING MAMMOGRAM FOR BREAST CANCER: ICD-10-CM

## 2023-11-16 PROCEDURE — 77063 MAMMO DIGITAL SCREENING BILAT WITH TOMO: ICD-10-PCS | Mod: 26,,, | Performed by: STUDENT IN AN ORGANIZED HEALTH CARE EDUCATION/TRAINING PROGRAM

## 2023-11-16 PROCEDURE — 77067 SCR MAMMO BI INCL CAD: CPT | Mod: TC

## 2023-11-16 PROCEDURE — 77067 SCR MAMMO BI INCL CAD: CPT | Mod: 26,,, | Performed by: STUDENT IN AN ORGANIZED HEALTH CARE EDUCATION/TRAINING PROGRAM

## 2023-11-16 PROCEDURE — 77063 BREAST TOMOSYNTHESIS BI: CPT | Mod: 26,,, | Performed by: STUDENT IN AN ORGANIZED HEALTH CARE EDUCATION/TRAINING PROGRAM

## 2023-11-16 PROCEDURE — 77067 MAMMO DIGITAL SCREENING BILAT WITH TOMO: ICD-10-PCS | Mod: 26,,, | Performed by: STUDENT IN AN ORGANIZED HEALTH CARE EDUCATION/TRAINING PROGRAM

## 2023-11-27 ENCOUNTER — OFFICE VISIT (OUTPATIENT)
Dept: FAMILY MEDICINE | Facility: CLINIC | Age: 58
End: 2023-11-27
Payer: MEDICARE

## 2023-11-27 DIAGNOSIS — Z12.11 ENCOUNTER FOR COLORECTAL CANCER SCREENING: Primary | ICD-10-CM

## 2023-11-27 DIAGNOSIS — Z12.12 ENCOUNTER FOR COLORECTAL CANCER SCREENING: Primary | ICD-10-CM

## 2023-11-27 DIAGNOSIS — M50.30 DEGENERATIVE, INTERVERTEBRAL DISC, CERVICAL: ICD-10-CM

## 2023-11-27 DIAGNOSIS — M35.9 UNDIFFERENTIATED CONNECTIVE TISSUE DISEASE: ICD-10-CM

## 2023-11-27 PROCEDURE — 99214 OFFICE O/P EST MOD 30 MIN: CPT | Mod: 95,,, | Performed by: STUDENT IN AN ORGANIZED HEALTH CARE EDUCATION/TRAINING PROGRAM

## 2023-11-27 PROCEDURE — 99214 PR OFFICE/OUTPT VISIT, EST, LEVL IV, 30-39 MIN: ICD-10-PCS | Mod: 95,,, | Performed by: STUDENT IN AN ORGANIZED HEALTH CARE EDUCATION/TRAINING PROGRAM

## 2023-11-27 RX ORDER — HYDROCODONE BITARTRATE AND ACETAMINOPHEN 5; 325 MG/1; MG/1
1 TABLET ORAL EVERY 6 HOURS PRN
Qty: 28 TABLET | Refills: 0 | Status: SHIPPED | OUTPATIENT
Start: 2023-11-27

## 2023-11-27 NOTE — PROGRESS NOTES
Audio Only Telehealth Visit     The patient location is: home   The chief complaint leading to consultation is: cervical pain   Visit type: Virtual visit with audio only (telephone)  Total time spent with patient: 15 minutes      The reason for the audio only service rather than synchronous audio and video virtual visit was related to technical difficulties or patient preference/necessity.     Each patient to whom I provide medical services by telemedicine is:  (1) informed of the relationship between the physician and patient and the respective role of any other health care provider with respect to management of the patient; and (2) notified that they may decline to receive medical services by telemedicine and may withdraw from such care at any time. Patient verbally consented to receive this service via voice-only telephone call.    Patient Name: Chloé Willams   : 1965  MRN: [id number]     Subjective:   Patient ID: Chloé Willams is a 58 y.o. female.    Chief Complaint: No chief complaint on file.       HPI: HPI  58-year-old female   Presents via telemedicine for cervical pain   previously been on gabapentin which did help relieve  Reports tizanidine did not help relieve pain  Started last visit on Flexeril which did not help with pain  States that the Norco that she was on did help however is asking for 1 more refill  Back Pain  8/10 Dull and aching and sharp at times.   See  Dr. Little   Is on Plaquenil  DINESH/CeNp  Continues with right hand nodules and pain   Is on Plaquenil      Depression/Anxiety   Celexa 20 mg .  Patient reports that this is doing helping and would like to continue on dose at this time.     Seizures  Takes Keppra 500 mg BID  Has been referred to neurology          HTN  Amlodipine 10 mg     Insomnia  Remeron 15 mg for sleep and appetite    GERD/Constipation/Decreased appetite  Protonix 40 mg      HLD/CVA  Atorvastatin 40 mg  Plavix 75 mg     Previously patient stated no history of  colon polyps or colon cancer and family will attempt to order Cologuard  ROS:  ROS   History:     Past Medical History:   Diagnosis Date    Anxiety disorder, unspecified     Aphasia 10/26/2017    Hyperlipidemia     Hypertension     Seizures       Past Surgical History:   Procedure Laterality Date    KALEIGH Leg Stents        SECTION      TUBAL LIGATION       Family History   Problem Relation Age of Onset    Cancer Mother     Heart attack Father       Social History     Tobacco Use    Smoking status: Every Day     Current packs/day: 0.50     Average packs/day: 0.5 packs/day for 15.0 years (7.5 ttl pk-yrs)     Types: Cigarettes     Passive exposure: Never    Smokeless tobacco: Never    Tobacco comments:     stop on own   Substance and Sexual Activity    Alcohol use: Not Currently    Drug use: Never    Sexual activity: Not Currently     Partners: Male     Birth control/protection: See Surgical Hx        Allergies: Review of patient's allergies indicates:  No Known Allergies  Objective:   There were no vitals filed for this visit.  There is no height or weight on file to calculate BMI.     Physical Examination:   Physical Exam  Able to speak in full sentence at conversational tone   Did review immunizations that are needed for patient and patient declines all immunizations  Assessment:     Problem List Items Addressed This Visit          Neuro    Degenerative, intervertebral disc, cervical    Current Assessment & Plan      reviewed and patient given 1 week fill of hydrocodone 2025 q.6 hours p.r.n. pain         Relevant Medications    HYDROcodone-acetaminophen (NORCO) 5-325 mg per tablet       Immunology/Multi System    Undifferentiated connective tissue disease    Relevant Medications    HYDROcodone-acetaminophen (NORCO) 5-325 mg per tablet     Other Visit Diagnoses       Encounter for colorectal cancer screening    -  Primary    Relevant Orders    Cologuard Screening (Multitarget Stool DNA)             Plan:   Diagnoses and all orders for this visit:    Encounter for colorectal cancer screening  -     Cologuard Screening (Multitarget Stool DNA); Future  -     Cologuard Screening (Multitarget Stool DNA)    Undifferentiated connective tissue disease  -     HYDROcodone-acetaminophen (NORCO) 5-325 mg per tablet; Take 1 tablet by mouth every 6 (six) hours as needed for Pain.    Degenerative, intervertebral disc, cervical  -     HYDROcodone-acetaminophen (NORCO) 5-325 mg per tablet; Take 1 tablet by mouth every 6 (six) hours as needed for Pain.       Follow up in about 1 month (around 12/27/2023) for Pain, Cervical Pain.       This note was created with the assistance of a voice recognition software or phone dictation. There may be transcription errors as a result of using this technology however minimal. Effort has been made to assure accuracy of transcription but any obvious errors or omissions should be clarified with the author of the document      This service was not originating from a related E/M service provided within the previous 7 days nor will  to an E/M service or procedure within the next 24 hours or my soonest available appointment.  Prevailing standard of care was able to be met in this audio-only visit.

## 2023-12-11 DIAGNOSIS — R63.0 DECREASED APPETITE: ICD-10-CM

## 2023-12-11 DIAGNOSIS — G47.00 INSOMNIA, UNSPECIFIED TYPE: ICD-10-CM

## 2023-12-11 DIAGNOSIS — F32.A DEPRESSION, UNSPECIFIED DEPRESSION TYPE: ICD-10-CM

## 2023-12-11 NOTE — TELEPHONE ENCOUNTER
Care Due:                  Date            Visit Type   Department     Provider  --------------------------------------------------------------------------------                                ESTABLISHED                              PATIENT -    [facility]  Last Visit: 11-      VIRTUAL      MEDICINE       Diana Berry                               -                              PRIMARY      [facility]  Next Visit: 01-      CARE ([facility])   MEDICINE       Diana Berry                                                            Last  Test          Frequency    Reason                     Performed    Due Date  --------------------------------------------------------------------------------    CBC.........  12 months..  clopidogreL, mirtazapine.  12- 12-    CMP.........  12 months..  atorvastatin, mirtazapine  12- 12-    Lipid Panel.  12 months..  atorvastatin.............  Not Found    Overdue    [employer] Embedded Care Due Messages. Reference number: [id number].   12/11/2023 3:43:06 PM CST

## 2023-12-12 RX ORDER — MIRTAZAPINE 15 MG/1
15 TABLET, FILM COATED ORAL NIGHTLY
Qty: 90 TABLET | Refills: 0 | Status: SHIPPED | OUTPATIENT
Start: 2023-12-12

## 2023-12-12 NOTE — TELEPHONE ENCOUNTER
Refill Routing Note   Medication(s) are not appropriate for processing by [facility] for the following reason(s):        Required labs outdated    [facility] action(s):  Defer     Requires labs : Yes             Appointments  past 12m or future 3m with PCP    Date Provider   Last Visit   11/27/2023 Diana Berry MD   Next Visit   1/5/2024 Diana Berry MD   ED visits in past 90 days: 0        Note composed:11:09 AM 12/12/2023

## 2023-12-13 DIAGNOSIS — G47.00 INSOMNIA, UNSPECIFIED TYPE: ICD-10-CM

## 2023-12-13 DIAGNOSIS — F32.A DEPRESSION, UNSPECIFIED DEPRESSION TYPE: ICD-10-CM

## 2023-12-13 DIAGNOSIS — R63.0 DECREASED APPETITE: ICD-10-CM

## 2023-12-13 RX ORDER — MIRTAZAPINE 15 MG/1
15 TABLET, FILM COATED ORAL NIGHTLY
Qty: 90 TABLET | Refills: 0 | OUTPATIENT
Start: 2023-12-13

## 2023-12-13 NOTE — TELEPHONE ENCOUNTER
Refill Decision Note  Quick DC. Duplicate Request-  med pended in previous encounter, awaiting assessment       Chloérody Willams  is requesting a refill authorization.  Brief Assessment and Rationale for Refill:  Quick Discontinue     Medication Therapy Plan:  Receipt confirmed by pharmacy (12/12/2023 11:11 AM CST) obie      Comments:     Note composed:10:51 AM 12/13/2023

## 2023-12-13 NOTE — TELEPHONE ENCOUNTER
No care due was identified.  [facility] Embedded Care Due Messages. Reference number: [id number].   12/13/2023 10:48:56 AM CST

## 2023-12-15 ENCOUNTER — PATIENT MESSAGE (OUTPATIENT)
Dept: FAMILY MEDICINE | Facility: CLINIC | Age: 58
End: 2023-12-15
Payer: MEDICARE

## 2023-12-15 LAB — NONINV COLON CA DNA+OCC BLD SCRN STL QL: NEGATIVE

## 2024-01-12 DIAGNOSIS — Z12.11 COLON CANCER SCREENING: Primary | ICD-10-CM

## 2024-01-30 NOTE — PROGRESS NOTES
"Subjective:           Patient ID: Chloé Willams is a 58 y.o. female.    Chief Complaint: Follow-up (Lower back pain bilateral finger pain on hands )      Ms. Chloé Willams is a 59 y/o female here for a f/u. She established care w/ Dr. Espinal 12/13/22. Last visit was almost one year ago with Dr. Espinal on 3/31/23. She did have a positive DINESH and was given the diagnosis of UCTD from Dr. Espinal.  At that visit he prescribed MTX 10 mg.     Past labs: DINESH 1:160 Centromere, dsDNA neg, SSA SSB neg, RF neg, CCP neg  Hx of stroke 2017  Hx of DVT- Hx of 2 bilateral leg stents [id number]  Hx of seizure- last seizure 2023- managed by PCP on [facility]    She reports she is not followed by Cardiology or Neurology.    Denies history of fevers, rashes, photosensitivity, oral or nasal ulcers, h/o MI, uveitis, malignancies.      Family history of autoimmune disease: No  Pregnancies: 3  Miscarriages: 1  Smoking: Current smoker 0.5ppd       Today Feb 2024 First time evaluated by me. She continues on MTX 10 mg.  Endorses bilateral hand pain with occasional "hand swelling"   Morning stiffness:1.5-2 hours. No synovitis on exam today. Denies any recent infections requiring antibiotics since last office visit. She does report just occasional reflux. I did review the labs and the recommendation of additional labs for the past DINESH 1:160 centromere. Also with hx of stroke and DVT will check additional labs         Review of Systems   Constitutional:  Negative for appetite change, chills and fever.   HENT:  Negative for congestion, ear pain, mouth sores, nosebleeds and trouble swallowing.    Eyes:  Negative for photophobia and discharge.   Respiratory:  Negative for chest tightness and shortness of breath.    Cardiovascular:  Negative for chest pain.   Gastrointestinal:  Negative for abdominal pain and vomiting.   Endocrine: Negative.    Genitourinary:  Negative for hematuria.   Musculoskeletal:  Positive for arthralgias.        As per " "HPI   Skin:  Negative for rash.   Neurological:  Negative for weakness.         Objective:   BP (!) 140/95 (BP Location: Left arm, Patient Position: Sitting, BP Method: Medium (Manual))   Pulse 69   Temp 99.4 °F (37.4 °C) (Oral)   Resp 18   Ht 5' 3" (1.6 m)   Wt 70.6 kg (155 lb 9.6 oz)   LMP  (LMP Unknown)   SpO2 95%   BMI 27.56 kg/m²          Physical Exam       Right Side Rheumatological Exam     Examination finds the 1st PIP, 1st MCP, 2nd PIP, 3rd PIP, 4th PIP, 4th MCP, 5th PIP and 5th MCP normal.    The patient is tender to palpation of the 2nd MCP and 3rd MCP    Left Side Rheumatological Exam     Examination finds the 1st PIP, 1st MCP, 2nd PIP, 2nd MCP, 3rd PIP, 3rd MCP, 4th PIP, 4th MCP, 5th PIP and 5th MCP normal.         No data to display     Assessment:         Medication List with Changes/Refills   Current Medications    ALBUTEROL (PROVENTIL/VENTOLIN HFA) 90 MCG/ACTUATION INHALER    INHALE 2 PUFFS BY MOUTH EVERY 4 HOURS AS NEEDED FOR COUGH       Start Date: 11/8/2023 End Date: --    ALPRAZOLAM (XANAX) 0.25 MG TABLET    Take one tablet 60 minutes before flight. May repeat 15 minutes prior to flight       Start Date: 8/10/2023 End Date: --    AMLODIPINE (NORVASC) 10 MG TABLET    Take 1 tablet (10 mg total) by mouth once daily.       Start Date: 2/16/2023 End Date: --    ASPIRIN (ECOTRIN) 81 MG EC TABLET    Take 81 mg by mouth.       Start Date: 9/13/2021 End Date: --    ATORVASTATIN (LIPITOR) 40 MG TABLET    Take 1 tablet (40 mg total) by mouth once daily.       Start Date: 7/12/2022 End Date: 7/12/2023    CITALOPRAM (CELEXA) 20 MG TABLET    Take 1 tablet (20 mg total) by mouth once daily.       Start Date: 2/16/2023 End Date: --    CLOPIDOGREL (PLAVIX) 75 MG TABLET    Take 1 tablet (75 mg total) by mouth once daily.       Start Date: 2/16/2023 End Date: --    CYCLOBENZAPRINE (FLEXERIL) 10 MG TABLET    Take 1 tablet (10 mg total) by mouth 3 (three) times daily as needed for Muscle spasms.       " Start Date: 9/28/2023 End Date: 4/25/2024    GABAPENTIN (NEURONTIN) 300 MG CAPSULE    Take 1 capsule (300 mg total) by mouth 3 (three) times daily.       Start Date: 9/28/2023 End Date: 9/27/2024    HYDROCODONE-ACETAMINOPHEN (NORCO) 5-325 MG PER TABLET    Take 1 tablet by mouth every 6 (six) hours as needed for Pain.       Start Date: 11/27/2023End Date: --    LEVETIRACETAM (KEPPRA) 500 MG TAB    Take 1 tablet (500 mg total) by mouth 2 (two) times daily.       Start Date: 2/16/2023 End Date: 2/16/2024    METHOTREXATE 2.5 MG TAB    Take 4 tablets (10 mg total) by mouth every 7 days. EVERY  FRIDAY      TAKE 4 TAB TOGETHER AFTER SUPPER       Start Date: 3/31/2023 End Date: 9/27/2023    MIRTAZAPINE (REMERON) 15 MG TABLET    Take 1 tablet (15 mg total) by mouth every evening.       Start Date: 12/12/2023End Date: --    OXYBUTYNIN (DITROPAN-XL) 5 MG TR24    Take 1 tablet (5 mg total) by mouth once daily.       Start Date: 10/26/2022End Date: 10/26/2023    PANTOPRAZOLE (PROTONIX) 40 MG TABLET    Take 1 tablet (40 mg total) by mouth once daily.       Start Date: 3/31/2023 End Date: --   Changed and/or Refilled Medications    Modified Medication Previous Medication    FOLIC ACID (FOLVITE) 1 MG TABLET folic acid (FOLVITE) 1 MG tablet       Take 1 tablet (1 mg total) by mouth once daily. After food    Take 1 tablet (1 mg total) by mouth once daily. After food       Start Date: 1/31/2024 End Date: --    Start Date: 3/31/2023 End Date: 1/31/2024    HYDROXYCHLOROQUINE (PLAQUENIL) 200 MG TABLET hydroxychloroquine (PLAQUENIL) 200 mg tablet       Take 1.5 tablets (300 mg total) by mouth once daily.    Take 200 mg by mouth 2 (two) times daily.       Start Date: 1/31/2024 End Date: --    Start Date: 12/1/2023 End Date: 1/31/2024   Discontinued Medications    CONJUGATED ESTROGENS (PREMARIN) VAGINAL CREAM    Place 1 g vaginally once daily. Apply nightly for 2 weeks, and then apply twice/week       Start Date: 10/26/2022End Date:  1/31/2024    LINACLOTIDE (LINZESS) 290 MCG CAP CAPSULE    Take 290 mcg by mouth before breakfast.       Start Date: 9/13/2021 End Date: 1/31/2024         ICD-10-CM ICD-9-CM   1. Undifferentiated connective tissue disease  M35.9 710.9   2. Fibromyalgia syndrome  M79.7 729.1   3. Drug-induced immunodeficiency  D84.821 279.3    Z79.899 E947.9   4. History of stroke  Z86.73 V12.54   5. Hypertension, unspecified type  I10 401.9   6. Insomnia, unspecified type  G47.00 780.52   7. Gastroesophageal reflux disease without esophagitis  K21.9 530.81   8. Tobacco dependence syndrome  F17.200 305.1           Plan:       1. Undifferentiated connective tissue disease  Assessment & Plan:  Past labs: DINESH 1:160 Centromere, dsDNA neg, SSA SSB neg,     Continue HCQ- Will dose at 5mg/kg- Decrease dose to 300 mg /day- new rx sent to pharmacy  Cont MTX 10 mg Q 7 days  Continue folic acid daily    Plaquenil Eye Exam: [facility]    Decreased dose of HCQ- weight based. The plan is to check labs today, she will need more labs to investigate the positive DINESH. If CBC/CMP okay then I will refill MTX and continue MTX 10 mg Q 7 days.    Check CBC, CMP, infectious work up, KEN, Complements, Urine, UPCR  Baseline CXR ordered        Orders:  -     Lupus Anticoagulant; Future; Expected date: 01/31/2024  -     Miscellaneous Test, Sendout Beta 2 glycoprotein IgA, IgM, IgG; Future; Expected date: 01/31/2024  -     Miscellaneous Test, Sendout Cardiolipin IgG, IgM, IgA, please send to [facility]; Future; Expected date: 01/31/2024  -     THYROID PEROXIDASE (TPO); Future; Expected date: 01/31/2024  -     Anti-Thyroglobulin Antibody; Future; Expected date: 01/31/2024  -     Protein/Creatinine Ratio, Urine; Future; Expected date: 01/31/2024  -     Anti-Scleroderma Antibody; Future; Expected date: 01/31/2024  -     dsDNA Ab with Crithidia Reflex; Future; Expected date: 01/31/2024  -     Anti-Smith Antibody; Future; Expected date: 01/31/2024  -     CBC Auto  Differential; Future; Expected date: 01/31/2024  -     Comprehensive Metabolic Panel; Future; Expected date: 01/31/2024  -     Sedimentation rate; Future; Expected date: 01/31/2024  -     C-Reactive Protein; Future; Expected date: 01/31/2024  -     C4 Complement; Future; Expected date: 01/31/2024  -     C3 Complement; Future; Expected date: 01/31/2024  -     DINESH IgG by IFA; Future; Expected date: 01/31/2024  -     SM and SM/RNP Antibodies; Future; Expected date: 01/31/2024  -     Miscellaneous Test, Sendout RNA polymerase III; Future; Expected date: 01/31/2024  -     Urinalysis, Reflex to Urine Culture; Future; Expected date: 01/31/2024  -     X-Ray Chest PA And Lateral; Future; Expected date: 01/31/2024  -     folic acid (FOLVITE) 1 MG tablet; Take 1 tablet (1 mg total) by mouth once daily. After food  Dispense: 90 tablet; Refill: 3  -     hydroxychloroquine (PLAQUENIL) 200 mg tablet; Take 1.5 tablets (300 mg total) by mouth once daily.  Dispense: 130 tablet; Refill: 1  -     Hepatitis C Antibody; Future; Expected date: 01/31/2024  -     Hepatitis B Surface Antigen; Future; Expected date: 01/31/2024  -     Hepatitis B Core Antibody, Total; Future; Expected date: 01/31/2024  -     Hepatitis B Surface Ab, Qualitative; Future; Expected date: 01/31/2024    2. Fibromyalgia syndrome  Assessment & Plan:  Stable  She is prescribed Flexeril 10 mg TID by PCP      Orders:  -     folic acid (FOLVITE) 1 MG tablet; Take 1 tablet (1 mg total) by mouth once daily. After food  Dispense: 90 tablet; Refill: 3    3. Drug-induced immunodeficiency  Assessment & Plan:  Compromised immune system s/t autoimmune disease and use of immunosuppressive medications. (MTX)  Hep serologies and TB negative  - Monitor carefully for infections and toxicities.   - Advised strict adherence to age appropriate vaccinations and cancer screenings- including yearly skin exams  - Advised to get immediate medical care if any infection.   - hold MTX with  infections      Orders:  -     hydroxychloroquine (PLAQUENIL) 200 mg tablet; Take 1.5 tablets (300 mg total) by mouth once daily.  Dispense: 130 tablet; Refill: 1  -     Hepatitis C Antibody; Future; Expected date: 01/31/2024  -     Hepatitis B Surface Antigen; Future; Expected date: 01/31/2024  -     Hepatitis B Core Antibody, Total; Future; Expected date: 01/31/2024  -     Hepatitis B Surface Ab, Qualitative; Future; Expected date: 01/31/2024    4. History of stroke  Assessment & Plan:  Hx of stroke 2017    Will check anticardiolipin, lupus anticoagulant and beta 2 glycoprotein      5. Hypertension, unspecified type  Assessment & Plan:  BP today is 149/96  Manual recheck: 140/95  Continue RX medications as prescribed by managing provider  Low Sodium Diet (Dash Diet - less than 2 grams of sodium per day).  Maintain healthy weight with goal BMI <30. Exercise 30 minutes per day 5 days per week.        6. Insomnia, unspecified type  Overview:  Remeron 15 mg  Adjunct for depression, insomnia and appetite     Orders:  -     folic acid (FOLVITE) 1 MG tablet; Take 1 tablet (1 mg total) by mouth once daily. After food  Dispense: 90 tablet; Refill: 3    7. Gastroesophageal reflux disease without esophagitis  Overview:  H. Pylori test        8. Tobacco dependence syndrome  Assessment & Plan:  Dangers of cigarette smoking were reviewed with patient in detail.   She is still smoking 0.5 ppd             42 minutes of total time spent on the encounter, which includes face to face time and non-face to face time preparing to see the patient (eg, review of tests), Obtaining and/or reviewing separately obtained history, Documenting clinical information in the electronic or other health record, Independently interpreting results (not separately reported) and communicating results to the patient/family/caregiver, or Care coordination (not separately reported).

## 2024-01-30 NOTE — ASSESSMENT & PLAN NOTE
Past labs: DINESH 1:160 Centromere, dsDNA neg, SSA SSB neg,     Continue HCQ- Will dose at 5mg/kg- Decrease dose to 300 mg /day- new rx sent to pharmacy  Cont MTX 10 mg Q 7 days  Continue folic acid daily    Plaquenil Eye Exam: [facility]    Decreased dose of HCQ- weight based. The plan is to check labs today, she will need more labs to investigate the positive DINESH. If CBC/CMP okay then I will refill MTX and continue MTX 10 mg Q 7 days.    Check CBC, CMP, infectious work up, KEN, Complements, Urine, UPCR  Baseline CXR ordered

## 2024-01-31 ENCOUNTER — HOSPITAL ENCOUNTER (OUTPATIENT)
Dept: RADIOLOGY | Facility: HOSPITAL | Age: 59
Discharge: HOME | End: 2024-01-31
Payer: MEDICARE

## 2024-01-31 ENCOUNTER — OFFICE VISIT (OUTPATIENT)
Dept: RHEUMATOLOGY | Facility: CLINIC | Age: 59
End: 2024-01-31
Payer: MEDICARE

## 2024-01-31 VITALS
TEMPERATURE: 99 F | HEIGHT: 63 IN | SYSTOLIC BLOOD PRESSURE: 140 MMHG | OXYGEN SATURATION: 95 % | WEIGHT: 155.63 LBS | DIASTOLIC BLOOD PRESSURE: 95 MMHG | RESPIRATION RATE: 18 BRPM | BODY MASS INDEX: 27.57 KG/M2 | HEART RATE: 69 BPM

## 2024-01-31 DIAGNOSIS — Z86.73 HISTORY OF STROKE: ICD-10-CM

## 2024-01-31 DIAGNOSIS — D84.821 DRUG-INDUCED IMMUNODEFICIENCY: ICD-10-CM

## 2024-01-31 DIAGNOSIS — Z79.899 DRUG-INDUCED IMMUNODEFICIENCY: ICD-10-CM

## 2024-01-31 DIAGNOSIS — M35.9 UNDIFFERENTIATED CONNECTIVE TISSUE DISEASE: Primary | ICD-10-CM

## 2024-01-31 DIAGNOSIS — G47.00 INSOMNIA, UNSPECIFIED TYPE: ICD-10-CM

## 2024-01-31 DIAGNOSIS — K21.9 GASTROESOPHAGEAL REFLUX DISEASE WITHOUT ESOPHAGITIS: ICD-10-CM

## 2024-01-31 DIAGNOSIS — I10 HYPERTENSION, UNSPECIFIED TYPE: ICD-10-CM

## 2024-01-31 DIAGNOSIS — F17.200 TOBACCO DEPENDENCE SYNDROME: ICD-10-CM

## 2024-01-31 DIAGNOSIS — M35.9 UNDIFFERENTIATED CONNECTIVE TISSUE DISEASE: ICD-10-CM

## 2024-01-31 DIAGNOSIS — M79.7 FIBROMYALGIA SYNDROME: ICD-10-CM

## 2024-01-31 PROCEDURE — 3008F BODY MASS INDEX DOCD: CPT | Mod: CPTII,S$GLB,,

## 2024-01-31 PROCEDURE — 3080F DIAST BP >= 90 MM HG: CPT | Mod: CPTII,S$GLB,,

## 2024-01-31 PROCEDURE — 3077F SYST BP >= 140 MM HG: CPT | Mod: CPTII,S$GLB,,

## 2024-01-31 PROCEDURE — 1159F MED LIST DOCD IN RCRD: CPT | Mod: CPTII,S$GLB,,

## 2024-01-31 PROCEDURE — 99999 PR PBB SHADOW E&M-EST. PATIENT-LVL V: CPT | Mod: PBBFAC,,,

## 2024-01-31 PROCEDURE — 71046 X-RAY EXAM CHEST 2 VIEWS: CPT | Mod: TC

## 2024-01-31 PROCEDURE — 99215 OFFICE O/P EST HI 40 MIN: CPT | Mod: S$GLB,,,

## 2024-01-31 RX ORDER — HYDROXYCHLOROQUINE SULFATE 200 MG/1
200 TABLET, FILM COATED ORAL 2 TIMES DAILY
COMMUNITY
Start: 2023-12-01 | End: 2024-01-31 | Stop reason: SDUPTHER

## 2024-01-31 RX ORDER — HYDROXYCHLOROQUINE SULFATE 200 MG/1
300 TABLET, FILM COATED ORAL DAILY
Qty: 130 TABLET | Refills: 1 | Status: SHIPPED | OUTPATIENT
Start: 2024-01-31

## 2024-01-31 RX ORDER — FOLIC ACID 1 MG/1
1 TABLET ORAL DAILY
Qty: 90 TABLET | Refills: 3 | Status: SHIPPED | OUTPATIENT
Start: 2024-01-31

## 2024-01-31 RX ORDER — ALBUTEROL SULFATE 90 UG/1
AEROSOL, METERED RESPIRATORY (INHALATION)
COMMUNITY
Start: 2023-11-08

## 2024-01-31 NOTE — ASSESSMENT & PLAN NOTE
Compromised immune system s/t autoimmune disease and use of immunosuppressive medications. (MTX)  Hep serologies and TB negative  - Monitor carefully for infections and toxicities.   - Advised strict adherence to age appropriate vaccinations and cancer screenings- including yearly skin exams  - Advised to get immediate medical care if any infection.   - hold MTX with infections

## 2024-01-31 NOTE — ASSESSMENT & PLAN NOTE
BP today is 149/96  Manual recheck: 140/95  Continue RX medications as prescribed by managing provider  Low Sodium Diet (Dash Diet - less than 2 grams of sodium per day).  Maintain healthy weight with goal BMI <30. Exercise 30 minutes per day 5 days per week.

## 2024-02-06 ENCOUNTER — TELEPHONE (OUTPATIENT)
Dept: RHEUMATOLOGY | Facility: CLINIC | Age: 59
End: 2024-02-06
Payer: MEDICARE

## 2024-02-06 DIAGNOSIS — F51.01 PRIMARY INSOMNIA: ICD-10-CM

## 2024-02-06 DIAGNOSIS — M35.9 UNDIFFERENTIATED CONNECTIVE TISSUE DISEASE: ICD-10-CM

## 2024-02-06 DIAGNOSIS — M79.7 FIBROMYALGIA SYNDROME: ICD-10-CM

## 2024-02-06 DIAGNOSIS — G47.00 INSOMNIA, UNSPECIFIED TYPE: ICD-10-CM

## 2024-02-06 RX ORDER — METHOTREXATE 2.5 MG/1
10 TABLET ORAL
Qty: 60 TABLET | Refills: 1 | Status: SHIPPED | OUTPATIENT
Start: 2024-02-06 | End: 2024-08-04

## 2024-02-06 NOTE — TELEPHONE ENCOUNTER
Results reviewed. All of her labs are within acceptable range. Blood counts are good, kidney function, liver enzymes are all normal. When we repeated the DINESH it was negative. All of the specific antibodies checked for Lupus, connective tissue and increased risk for blood clots were all negative. The lab did not run 2 specific antibodies to look for scleroderma, We will complete those 2 labs at her next visit. The urine showed she could have an infection but the culture did not show any significant growth so she does not need antibiotics.  I sent the prescription of MTX to [facility] pharmacy. She can continue taking 4 tabs once per week.  We will check labs again at her appointment in May.

## 2024-04-24 ENCOUNTER — LAB VISIT (OUTPATIENT)
Dept: LAB | Facility: HOSPITAL | Age: 59
End: 2024-04-24
Attending: NURSE PRACTITIONER
Payer: MEDICARE

## 2024-04-24 ENCOUNTER — OFFICE VISIT (OUTPATIENT)
Dept: GYNECOLOGY | Facility: CLINIC | Age: 59
End: 2024-04-24
Payer: MEDICARE

## 2024-04-24 VITALS
RESPIRATION RATE: 18 BRPM | SYSTOLIC BLOOD PRESSURE: 160 MMHG | OXYGEN SATURATION: 99 % | HEIGHT: 63 IN | TEMPERATURE: 98 F | DIASTOLIC BLOOD PRESSURE: 92 MMHG | HEART RATE: 65 BPM | WEIGHT: 157.81 LBS | BODY MASS INDEX: 27.96 KG/M2

## 2024-04-24 DIAGNOSIS — R21 RASH: ICD-10-CM

## 2024-04-24 DIAGNOSIS — Z11.3 ROUTINE SCREENING FOR STI (SEXUALLY TRANSMITTED INFECTION): ICD-10-CM

## 2024-04-24 DIAGNOSIS — Z01.419 WOMEN'S ANNUAL ROUTINE GYNECOLOGICAL EXAMINATION: Primary | ICD-10-CM

## 2024-04-24 DIAGNOSIS — Z12.31 SCREENING MAMMOGRAM FOR BREAST CANCER: ICD-10-CM

## 2024-04-24 DIAGNOSIS — G47.00 INSOMNIA, UNSPECIFIED TYPE: ICD-10-CM

## 2024-04-24 DIAGNOSIS — N32.81 OAB (OVERACTIVE BLADDER): ICD-10-CM

## 2024-04-24 LAB
CLUE CELLS VAG QL WET PREP: ABNORMAL
HBV SURFACE AG SERPL QL IA: NONREACTIVE
HCV AB SERPL QL IA: NONREACTIVE
HIV 1+2 AB+HIV1 P24 AG SERPL QL IA: NONREACTIVE
T PALLIDUM AB SER QL: NONREACTIVE
T VAGINALIS VAG QL WET PREP: ABNORMAL
WBC #/AREA VAG WET PREP: ABNORMAL
YEAST SPEC QL WET PREP: ABNORMAL

## 2024-04-24 PROCEDURE — 87210 SMEAR WET MOUNT SALINE/INK: CPT | Performed by: NURSE PRACTITIONER

## 2024-04-24 PROCEDURE — 3077F SYST BP >= 140 MM HG: CPT | Mod: CPTII,,, | Performed by: NURSE PRACTITIONER

## 2024-04-24 PROCEDURE — 86780 TREPONEMA PALLIDUM: CPT

## 2024-04-24 PROCEDURE — 99215 OFFICE O/P EST HI 40 MIN: CPT | Mod: PBBFAC | Performed by: NURSE PRACTITIONER

## 2024-04-24 PROCEDURE — G0101 CA SCREEN;PELVIC/BREAST EXAM: HCPCS | Mod: S$PBB,,, | Performed by: NURSE PRACTITIONER

## 2024-04-24 PROCEDURE — 36415 COLL VENOUS BLD VENIPUNCTURE: CPT

## 2024-04-24 PROCEDURE — 88174 CYTOPATH C/V AUTO IN FLUID: CPT | Performed by: NURSE PRACTITIONER

## 2024-04-24 PROCEDURE — 1159F MED LIST DOCD IN RCRD: CPT | Mod: CPTII,,, | Performed by: NURSE PRACTITIONER

## 2024-04-24 PROCEDURE — 87491 CHLMYD TRACH DNA AMP PROBE: CPT | Performed by: NURSE PRACTITIONER

## 2024-04-24 PROCEDURE — 86803 HEPATITIS C AB TEST: CPT

## 2024-04-24 PROCEDURE — 87389 HIV-1 AG W/HIV-1&-2 AB AG IA: CPT

## 2024-04-24 PROCEDURE — 3080F DIAST BP >= 90 MM HG: CPT | Mod: CPTII,,, | Performed by: NURSE PRACTITIONER

## 2024-04-24 PROCEDURE — 3008F BODY MASS INDEX DOCD: CPT | Mod: CPTII,,, | Performed by: NURSE PRACTITIONER

## 2024-04-24 PROCEDURE — 87340 HEPATITIS B SURFACE AG IA: CPT

## 2024-04-24 PROCEDURE — 1160F RVW MEDS BY RX/DR IN RCRD: CPT | Mod: CPTII,,, | Performed by: NURSE PRACTITIONER

## 2024-04-24 PROCEDURE — 87624 HPV HI-RISK TYP POOLED RSLT: CPT | Performed by: NURSE PRACTITIONER

## 2024-04-24 PROCEDURE — 87591 N.GONORRHOEAE DNA AMP PROB: CPT | Performed by: NURSE PRACTITIONER

## 2024-04-24 RX ORDER — METHOCARBAMOL 750 MG/1
1500 TABLET, FILM COATED ORAL 3 TIMES DAILY
COMMUNITY
Start: 2024-03-20

## 2024-04-24 RX ORDER — TRIAMCINOLONE ACETONIDE 1 MG/G
OINTMENT TOPICAL 2 TIMES DAILY
Qty: 1 EACH | Refills: 0 | Status: SHIPPED | OUTPATIENT
Start: 2024-04-24 | End: 2024-05-01

## 2024-04-24 RX ORDER — HYDROXYZINE PAMOATE 50 MG/1
CAPSULE ORAL
Qty: 30 CAPSULE | Refills: 11 | Status: SHIPPED | OUTPATIENT
Start: 2024-04-24

## 2024-04-24 RX ORDER — OXYBUTYNIN CHLORIDE 5 MG/1
5 TABLET, EXTENDED RELEASE ORAL DAILY
Qty: 30 TABLET | Refills: 11 | Status: SHIPPED | OUTPATIENT
Start: 2024-04-24 | End: 2025-04-24

## 2024-04-24 RX ORDER — PREDNISONE 10 MG/1
10 TABLET ORAL 2 TIMES DAILY
COMMUNITY
Start: 2024-03-20

## 2024-04-24 NOTE — PROGRESS NOTES
"Patient ID: Chloé Willams is a 58 y.o. female.    Chief Complaint: Well Woman      Review of patient's allergies indicates:  No Known Allergies      Past Medical History:   Diagnosis Date    Anxiety disorder, unspecified     Aphasia 10/26/2017    Degenerative, intervertebral disc, cervical     Depression     GERD (gastroesophageal reflux disease)     Hyperlipidemia     Hypertension     OAB (overactive bladder)     Seizures     Stroke             HPI:  The patient is (SABx2) here for annual gyn exam. Denies hx of abnormal pap. Last pap . Pt is postmenopausal. Denies vaginal bleeding/discharge. Denies pain. Pt is currently on oxybutynin for OAB and vistaril 50mg at night for insomnia and is requesting refills. Denies dysuria. Denies breast complaints. States is not currently sexually active but she is interested in screening. Denies hx of STIs. Today, pt c/o periumbilical pruritic rash x 3 days.     Review of Systems:   Negative except for findings in HPI     Objective:   BP (!) 160/92 (BP Location: Right arm, Patient Position: Sitting, BP Method: Medium (Manual))   Pulse 65   Temp 98.3 °F (36.8 °C) (Oral)   Resp 18   Ht 5' 3" (1.6 m)   Wt 71.6 kg (157 lb 12.8 oz)   LMP  (LMP Unknown)   SpO2 99%   BMI 27.95 kg/m²    Physical Exam:  GENERAL: Pt is aware and alert and  in no acute distress.  BREASTS: Bilateral-No masses, nipple discharge, skin changes, or tenderness.  ABDOMEN: Soft, non tender.vertical scar  VULVA:  No lesions or skin changes.  URETHRA: No lesions  BLADDER: No tenderness.  VAGINA: Mucosa normal,white discharge; no lesions.  CERVIX:  no CMT, NO discharge; NO lesions  BIMANUAL EXAM:  The uterus is  nontender, no palpable masses. Ld adnexa reveal no evidence of masses; no fullness   SKIN: Warm and Dry. Dry/raised, crusted periumbilical rash  PSYCHIATRIC: Patient is awake and alert. Mood and affect are normal.    Assessment:   Women's annual routine gynecological examination  -     " Liquid-Based Pap Smear, Screening Screening    Screening mammogram for breast cancer  -     Mammo Digital Screening Bilat w/ Bright; Future; Expected date: 11/30/2024    Routine screening for STI (sexually transmitted infection)  -     SYPHILIS ANTIBODY (WITH REFLEX RPR); Future; Expected date: 04/24/2024  -     HIV 1/2 Ag/Ab (4th Gen); Future; Expected date: 04/24/2024  -     Hepatitis B Surface Antigen; Future; Expected date: 04/24/2024  -     Hepatitis C Antibody; Future; Expected date: 04/24/2024  -     Wet Prep, Genital    OAB (overactive bladder)  -     oxybutynin (DITROPAN-XL) 5 MG TR24; Take 1 tablet (5 mg total) by mouth once daily.  Dispense: 30 tablet; Refill: 11    Rash  -     triamcinolone acetonide 0.1% (KENALOG) 0.1 % ointment; Apply topically 2 (two) times daily. for 7 days  Dispense: 1 each; Refill: 0    Insomnia, unspecified type  -     hydrOXYzine pamoate (VISTARIL) 50 MG Cap; One tablet nightly as needed  Dispense: 30 capsule; Refill: 11            1. Women's annual routine gynecological examination    2. Screening mammogram for breast cancer    3. Routine screening for STI (sexually transmitted infection)    4. OAB (overactive bladder)    5. Rash    6. Insomnia, unspecified type             -Contact clinic with any vaginal bleeding as this would be abnormal in postmenopausal pt. Encouraged dietary or supplemental intake of calcium 600mg +Vit D 400 IU twice daily for osteoporosis prevention.    -notify clinic if no improvement in rash; keep area dry  Plan:       Follow up in about 1 year (around 4/24/2025).

## 2024-04-26 LAB
CHLAMYDIA TRACHOMATIS: NEGATIVE
HIGH RISK HPV: NEGATIVE
NEISSERIA GONORRHOEAE: NEGATIVE
PSYCHE PATHOLOGY RESULT: NORMAL

## 2024-04-29 ENCOUNTER — HOSPITAL ENCOUNTER (EMERGENCY)
Facility: HOSPITAL | Age: 59
Discharge: HOME | End: 2024-04-29
Attending: EMERGENCY MEDICINE
Payer: MEDICARE

## 2024-04-29 VITALS
OXYGEN SATURATION: 100 % | RESPIRATION RATE: 15 BRPM | HEIGHT: 62 IN | BODY MASS INDEX: 34.04 KG/M2 | WEIGHT: 185 LBS | DIASTOLIC BLOOD PRESSURE: 67 MMHG | SYSTOLIC BLOOD PRESSURE: 102 MMHG | HEART RATE: 70 BPM | TEMPERATURE: 98 F

## 2024-04-29 DIAGNOSIS — R53.1 WEAKNESS: ICD-10-CM

## 2024-04-29 DIAGNOSIS — R56.9 SEIZURE: ICD-10-CM

## 2024-04-29 DIAGNOSIS — G40.909 SEIZURE DISORDER: Primary | ICD-10-CM

## 2024-04-29 LAB
ALBUMIN SERPL-MCNC: 4.2 G/DL (ref 3.5–5)
ALBUMIN/GLOB SERPL: 1.2 RATIO (ref 1.1–2)
ALP SERPL-CCNC: 90 UNIT/L (ref 40–150)
ALT SERPL-CCNC: 21 UNIT/L (ref 0–55)
AST SERPL-CCNC: 29 UNIT/L (ref 5–34)
BASOPHILS # BLD AUTO: 0.05 X10(3)/MCL
BASOPHILS NFR BLD AUTO: 0.5 %
BILIRUB SERPL-MCNC: 0.5 MG/DL
BUN SERPL-MCNC: 11.2 MG/DL (ref 9.8–20.1)
CALCIUM SERPL-MCNC: 8.9 MG/DL (ref 8.4–10.2)
CHLORIDE SERPL-SCNC: 108 MMOL/L (ref 98–107)
CO2 SERPL-SCNC: 17 MMOL/L (ref 22–29)
CREAT SERPL-MCNC: 0.85 MG/DL (ref 0.55–1.02)
EOSINOPHIL # BLD AUTO: 0.17 X10(3)/MCL (ref 0–0.9)
EOSINOPHIL NFR BLD AUTO: 1.8 %
ERYTHROCYTE [DISTWIDTH] IN BLOOD BY AUTOMATED COUNT: 13.9 % (ref 11.5–17)
ETHANOL SERPL-MCNC: 112 MG/DL
GFR SERPLBLD CREATININE-BSD FMLA CKD-EPI: >60 MLS/MIN/1.73/M2
GLOBULIN SER-MCNC: 3.4 GM/DL (ref 2.4–3.5)
GLUCOSE SERPL-MCNC: 89 MG/DL (ref 74–100)
HCT VFR BLD AUTO: 45.6 % (ref 37–47)
HGB BLD-MCNC: 15.3 G/DL (ref 12–16)
IMM GRANULOCYTES # BLD AUTO: 0.03 X10(3)/MCL (ref 0–0.04)
IMM GRANULOCYTES NFR BLD AUTO: 0.3 %
LYMPHOCYTES # BLD AUTO: 2.49 X10(3)/MCL (ref 0.6–4.6)
LYMPHOCYTES NFR BLD AUTO: 26.5 %
MCH RBC QN AUTO: 30.6 PG (ref 27–31)
MCHC RBC AUTO-ENTMCNC: 33.6 G/DL (ref 33–36)
MCV RBC AUTO: 91.2 FL (ref 80–94)
MONOCYTES # BLD AUTO: 0.62 X10(3)/MCL (ref 0.1–1.3)
MONOCYTES NFR BLD AUTO: 6.6 %
NEUTROPHILS # BLD AUTO: 6.03 X10(3)/MCL (ref 2.1–9.2)
NEUTROPHILS NFR BLD AUTO: 64.3 %
NRBC BLD AUTO-RTO: 0 %
OHS QRS DURATION: 90 MS
OHS QTC CALCULATION: 478 MS
PLATELET # BLD AUTO: 227 X10(3)/MCL (ref 130–400)
PMV BLD AUTO: 10.8 FL (ref 7.4–10.4)
POTASSIUM SERPL-SCNC: 4 MMOL/L (ref 3.5–5.1)
PROT SERPL-MCNC: 7.6 GM/DL (ref 6.4–8.3)
RBC # BLD AUTO: 5 X10(6)/MCL (ref 4.2–5.4)
SODIUM SERPL-SCNC: 141 MMOL/L (ref 136–145)
WBC # SPEC AUTO: 9.39 X10(3)/MCL (ref 4.5–11.5)

## 2024-04-29 PROCEDURE — 93005 ELECTROCARDIOGRAM TRACING: CPT

## 2024-04-29 PROCEDURE — 96360 HYDRATION IV INFUSION INIT: CPT

## 2024-04-29 PROCEDURE — 80053 COMPREHEN METABOLIC PANEL: CPT | Performed by: EMERGENCY MEDICINE

## 2024-04-29 PROCEDURE — 85025 COMPLETE CBC W/AUTO DIFF WBC: CPT | Performed by: EMERGENCY MEDICINE

## 2024-04-29 PROCEDURE — 93010 ELECTROCARDIOGRAM REPORT: CPT | Mod: ,,, | Performed by: INTERNAL MEDICINE

## 2024-04-29 PROCEDURE — 63600175 PHARM REV CODE 636 W HCPCS: Performed by: EMERGENCY MEDICINE

## 2024-04-29 PROCEDURE — 99285 EMERGENCY DEPT VISIT HI MDM: CPT | Mod: 25

## 2024-04-29 PROCEDURE — 82077 ASSAY SPEC XCP UR&BREATH IA: CPT | Performed by: EMERGENCY MEDICINE

## 2024-04-29 RX ORDER — LEVETIRACETAM 500 MG/1
500 TABLET ORAL 2 TIMES DAILY
Qty: 180 TABLET | Refills: 3 | Status: SHIPPED | OUTPATIENT
Start: 2024-04-29 | End: 2025-04-29

## 2024-04-29 RX ORDER — LEVETIRACETAM 10 MG/ML
1000 INJECTION INTRAVASCULAR
Status: COMPLETED | OUTPATIENT
Start: 2024-04-29 | End: 2024-04-29

## 2024-04-29 RX ADMIN — LEVETIRACETAM INJECTION 1000 MG: 10 INJECTION INTRAVENOUS at 01:04

## 2024-04-29 NOTE — ED PROVIDER NOTES
Encounter Date: 2024       History     Chief Complaint   Patient presents with    Seizures     Patient to ER after party +ETOH, family reports patient had a seizure. HX seizure and HTN     Patient with a history of seizures per EMS was at a party and drinking and then had an argument with family and then had a seizure that lasted 30 minutes per EMS patient states did not take her medication was given 10 of Versed EN route.  Vital signs stable no recent fevers chills no headache no neck pain no nausea no vomiting    The history is provided by the EMS personnel and the patient.     Review of patient's allergies indicates:  No Known Allergies  Past Medical History:   Diagnosis Date    Anxiety disorder, unspecified     Aphasia 10/26/2017    Degenerative, intervertebral disc, cervical     Depression     GERD (gastroesophageal reflux disease)     Hyperlipidemia     Hypertension     OAB (overactive bladder)     Seizures     Stroke      Past Surgical History:   Procedure Laterality Date    KALEIGH Leg Stents        SECTION      TUBAL LIGATION       Family History   Problem Relation Name Age of Onset    Heart attack Father      Throat cancer Mother      Breast cancer Daughter       Social History     Tobacco Use    Smoking status: Every Day     Current packs/day: 0.50     Average packs/day: 0.5 packs/day for 15.0 years (7.5 ttl pk-yrs)     Types: Cigarettes     Passive exposure: Current    Smokeless tobacco: Never    Tobacco comments:     stop on own   Substance Use Topics    Alcohol use: Yes     Alcohol/week: 1.0 standard drink of alcohol     Types: 1 Cans of beer per week     Comment: every     Drug use: Never     Review of Systems   Constitutional:  Negative for fever.   HENT:  Negative for sore throat.    Respiratory:  Negative for shortness of breath.    Cardiovascular:  Negative for chest pain.   Gastrointestinal:  Negative for nausea.   Genitourinary:  Negative for difficulty urinating, dyspareunia,  dysuria, menstrual problem, vaginal bleeding and vaginal discharge.   Musculoskeletal:  Negative for back pain.   Skin:  Negative for rash.   Neurological:  Negative for weakness.   Hematological:  Does not bruise/bleed easily.       Physical Exam     Initial Vitals [04/29/24 0041]   BP Pulse Resp Temp SpO2   (!) 128/94 109 18 97.9 °F (36.6 °C) 98 %      MAP       --         Physical Exam    Constitutional: She appears well-developed and well-nourished.   Odor of alcohol on breath   HENT:   Head: Normocephalic and atraumatic.   Mouth/Throat: Oropharynx is clear and moist.   Eyes: Conjunctivae are normal. Pupils are equal, round, and reactive to light.   Neck: Neck supple.   Normal range of motion.  Cardiovascular:  Normal rate, regular rhythm and normal heart sounds.           Pulmonary/Chest: Breath sounds normal. She has no wheezes. She has no rhonchi. She has no rales.   Abdominal: Abdomen is soft. Bowel sounds are normal.   Musculoskeletal:         General: Normal range of motion.      Cervical back: Normal range of motion and neck supple.     Neurological: She is alert. GCS eye subscore is 4. GCS verbal subscore is 5. GCS motor subscore is 6.   Slurred speech   Skin: Skin is warm and dry. Capillary refill takes less than 2 seconds.   Psychiatric: She has a normal mood and affect. Her behavior is normal. Judgment and thought content normal.         ED Course   Critical Care    Date/Time: 4/29/2024 5:50 AM    Performed by: Yohan Adams III, MD  Authorized by: Yohan Adams III, MD  Direct patient critical care time: 25 minutes  Documentation critical care time: 8 minutes  Consulting other physicians critical care time: 0 minutes  Total critical care time (exclusive of procedural time) : 33 minutes  Critical care was necessary to treat or prevent imminent or life-threatening deterioration of the following conditions: CNS failure or compromise.  Critical care was time spent personally by me on the following  activities: discussions with primary provider, discussions with consultants, examination of patient, re-evaluation of patient's condition, review of old charts, ordering and review of laboratory studies and ordering and performing treatments and interventions.        Labs Reviewed   COMPREHENSIVE METABOLIC PANEL - Abnormal; Notable for the following components:       Result Value    Chloride 108 (*)     Carbon Dioxide 17 (*)     All other components within normal limits   ALCOHOL,MEDICAL (ETHANOL) - Abnormal; Notable for the following components:    Ethanol Level 112.0 (*)     All other components within normal limits   CBC WITH DIFFERENTIAL - Abnormal; Notable for the following components:    MPV 10.8 (*)     All other components within normal limits   CBC W/ AUTO DIFFERENTIAL    Narrative:     The following orders were created for panel order CBC auto differential.  Procedure                               Abnormality         Status                     ---------                               -----------         ------                     CBC with Differential[[id number]]       Abnormal            Final result                 Please view results for these tests on the individual orders.        ECG Results              EKG 12-lead (Final result)        Collection Time Result Time QRS Duration OHS QTC Calculation    04/29/24 01:42:14 04/29/24 05:26:53 90 478                     Final result by Interface, Lab In [facility] (04/29/24 05:26:57)                   Narrative:    Test Reason : R53.1,    Vent. Rate : 086 BPM     Atrial Rate : 086 BPM     P-R Int : 132 ms          QRS Dur : 090 ms      QT Int : 400 ms       P-R-T Axes : 063 048 052 degrees     QTc Int : 478 ms    Normal sinus rhythm  Nonspecific T wave abnormality  Prolonged QT  Abnormal ECG  When compared with ECG of 26-OCT-2017 22:00,  Vent. rate has increased BY  32 BPM  Nonspecific T wave abnormality now evident in Anterior leads  Confirmed by Mirna RAHMAN, Sander  ([id number]) on 4/29/2024 5:26:48 AM    Referred By:             Confirmed By:Sander Bradley MD                                  Imaging Results              CT Head Without Contrast (Final result)  Result time 04/29/24 06:13:06      Final result by Guero Camejo MD (04/29/24 06:13:06)                   Impression:      No acute intracranial abnormality identified.      Electronically signed by: Guero Camejo  Date:    04/29/2024  Time:    06:13               Narrative:    EXAMINATION:  CT HEAD WITHOUT CONTRAST    CLINICAL HISTORY:  Mental status change, unknown cause;    TECHNIQUE:  Low dose axial images were obtained through the head.  Coronal and sagittal reformations were also performed. Contrast was not administered.    Automatic exposure control was utilized to reduce the patient's radiation dose.    DLP= 1056    COMPARISON:  03/13/2022    FINDINGS:  No acute intracranial hemorrhage, edema or mass. No acute parenchymal abnormality.    There is no hydrocephalus, evidence of herniation or midline shift. The ventricles and sulci are normal.    There is normal gray white differentiation.    The osseous structures are normal.    The mastoid air cells are clear.    Debris noted within the left auditory canal.    The globes and orbital contents are normal bilaterally.    The visualized maxillary, ethmoid and sphenoid sinuses are clear.                        Preliminary result by Heath Velazco MD (04/29/24 01:41:12)                   Impression:    1. No acute intracranial process identified. Details and other findings as noted above.               Narrative:    START OF REPORT:  Technique: CT of the head was performed without intravenous contrast with axial as well as coronal and sagittal images.    Comparison: None.    Dosage Information: Automated exposure control was utilized.    Clinical history: Seizure.    Findings:  Hemorrhage: No acute intracranial hemorrhage is seen.  CSF spaces: The ventricles sulci and basal  cisterns are within normal limits.  Brain parenchyma: Unremarkable with preservation of the grey white junction throughout.  Cerebellum: Unremarkable.  Sella and skull base: The sella appears to be within normal limits for age.  Intracranial calcifications: Incidental note is made of bilateral choroid plexus calcification. Incidental note is made of some pineal region calcification. Incidental note is made of minimal right basal ganglia calcifcation.    Maxillofacial Structures:  Paranasal sinuses: There is some mucoperiosteal thickening in the right maxillary sinuses and left ethmoid air cells.  Orbits: The left lacrimal gland appears mildly prominent.  Temporal bones and mastoids: The temporal bones and mastoids appear unremarkable.  TMJ: The mandibular condyles appear normally placed with respect to the mandibular fossa.                                         Medications   levETIRAcetam in NaCl (iso-os) IVPB 1,000 mg (0 mg Intravenous Stopped 4/29/24 0203)     Medical Decision Making  Differential diagnosis is breakthrough seizure hypocalcemia electrolyte disturbance alcohol intoxication intracranial injury    Slurred speech alcohol on breath and was given Versed CT scan done for altered mentation it was negative.  Patient was given dose of IV Keppra it was unsure if patient is on her seizure medication because Keppra is not listed as current medication.  Patient multiple re-evaluations at 2 3 in 5 patient with slow gradual return to normal mental status.  When family returns this morning patient will be discharged    Amount and/or Complexity of Data Reviewed  Labs: ordered.  Radiology: ordered.    Risk  Prescription drug management.               ED Course as of 04/29/24 0852   Mon Apr 29, 2024   0223 CT head without abnormality Keppra infused will continue to monitor did get 10 mg of Versed [FK]      ED Course User Index  [FK] Yohan Adams III, MD                           Clinical Impression:  Final  diagnoses:  [R53.1] Weakness  [G40.909] Seizure disorder (Primary)          ED Disposition Condition    Discharge Stable          ED Prescriptions       Medication Sig Dispense Start Date End Date Auth. Provider    levETIRAcetam (KEPPRA) 500 MG Tab Take 1 tablet (500 mg total) by mouth 2 (two) times daily. 180 tablet 4/29/2024 4/29/2025 Yohan Adams III, MD          Follow-up Information    None          Yohan Adams III, MD  04/29/24 1098

## 2024-07-09 DIAGNOSIS — I10 HYPERTENSION, UNSPECIFIED TYPE: ICD-10-CM

## 2024-07-09 RX ORDER — AMLODIPINE BESYLATE 10 MG/1
10 TABLET ORAL DAILY
Qty: 90 TABLET | Refills: 1 | Status: SHIPPED | OUTPATIENT
Start: 2024-07-09

## 2024-07-09 NOTE — TELEPHONE ENCOUNTER
Care Due:                  Date            Visit Type   Department     Provider  --------------------------------------------------------------------------------                                ESTABLISHED                              PATIENT -    [facility]  Last Visit: 11-      [employer]      MEDICINE       Diana Berry  Next Visit: None Scheduled  None         None Found                                                            Last  Test          Frequency    Reason                     Performed    Due Date  --------------------------------------------------------------------------------    CBC.........  12 months..  mirtazapine..............  04- 07-    [facility] Embedded Care Due Messages. Reference number: [id number].   7/09/2024 3:58:30 PM CDT

## 2024-07-10 NOTE — TELEPHONE ENCOUNTER
Provider Staff:  Action required for this patient    Requires labs      Please see care gap opportunities below in Care Due Message.    Thanks!  [facility]     Appointments      Date Provider   Last Visit   11/27/2023 Diana Berry MD   Next Visit   Visit date not found Diana Berry MD     Refill Decision Note   Chloé Willams  is requesting a refill authorization.    Brief Assessment and Rationale for Refill:   Approve       Medication Therapy Plan:  Acute care/admission documentation reviewed. No change in therapy. Ok to approve.      Extended chart review required: Yes   Comments:     Note composed:11:15 PM 07/09/2024

## 2024-08-25 ENCOUNTER — HOSPITAL ENCOUNTER (EMERGENCY)
Facility: HOSPITAL | Age: 59
Discharge: HOME | End: 2024-08-25
Attending: STUDENT IN AN ORGANIZED HEALTH CARE EDUCATION/TRAINING PROGRAM
Payer: MEDICARE

## 2024-08-25 VITALS
WEIGHT: 158.63 LBS | HEART RATE: 68 BPM | SYSTOLIC BLOOD PRESSURE: 176 MMHG | DIASTOLIC BLOOD PRESSURE: 122 MMHG | RESPIRATION RATE: 18 BRPM | TEMPERATURE: 98 F | OXYGEN SATURATION: 98 % | BODY MASS INDEX: 29.19 KG/M2 | HEIGHT: 62 IN

## 2024-08-25 DIAGNOSIS — B02.9 HERPES ZOSTER WITHOUT COMPLICATION: Primary | ICD-10-CM

## 2024-08-25 PROCEDURE — 99284 EMERGENCY DEPT VISIT MOD MDM: CPT

## 2024-08-25 RX ORDER — HYDROCODONE BITARTRATE AND ACETAMINOPHEN 5; 325 MG/1; MG/1
1 TABLET ORAL EVERY 6 HOURS PRN
Qty: 12 TABLET | Refills: 0 | Status: SHIPPED | OUTPATIENT
Start: 2024-08-25 | End: 2024-08-28

## 2024-08-25 RX ORDER — GABAPENTIN 300 MG/1
300 CAPSULE ORAL 3 TIMES DAILY
Qty: 90 CAPSULE | Refills: 11 | Status: SHIPPED | OUTPATIENT
Start: 2024-08-25 | End: 2025-08-25

## 2024-08-25 NOTE — Clinical Note
"Chloé Hernandez" Imer was seen and treated in our emergency department on 8/25/2024.  She may return to work on 08/27/2024.       If you have any questions or concerns, please don't hesitate to call.       RN    "

## 2024-08-26 NOTE — ED PROVIDER NOTES
Encounter Date: 2024       History     Chief Complaint   Patient presents with    Rash     Patient has a rash to back that is suspicious for shingles. Rash is to mid thoracic spine.     HPI    59-year-old female with a past medical history of hypertension, diabetes and epilepsy presents emergency department for a rash to her left flank that started a week ago.  Patient states it is itchy and burns.    Review of patient's allergies indicates:  No Known Allergies  Past Medical History:   Diagnosis Date    Anxiety disorder, unspecified     Aphasia 10/26/2017    Degenerative, intervertebral disc, cervical     Depression     GERD (gastroesophageal reflux disease)     Hyperlipidemia     Hypertension     OAB (overactive bladder)     Seizures     Stroke      Past Surgical History:   Procedure Laterality Date    KALEIGH Leg Stents        SECTION      TUBAL LIGATION       Family History   Problem Relation Name Age of Onset    Heart attack Father      Throat cancer Mother      Breast cancer Daughter       Social History     Tobacco Use    Smoking status: Every Day     Current packs/day: 0.50     Average packs/day: 0.5 packs/day for 15.0 years (7.5 ttl pk-yrs)     Types: Cigarettes     Passive exposure: Current    Smokeless tobacco: Never    Tobacco comments:     stop on own   Substance Use Topics    Alcohol use: Yes     Alcohol/week: 1.0 standard drink of alcohol     Types: 1 Cans of beer per week     Comment: every     Drug use: Never     Review of Systems   Constitutional:  Negative for fever.   Respiratory:  Negative for cough.    Cardiovascular:  Negative for chest pain.   Gastrointestinal:  Negative for abdominal pain, constipation, diarrhea, nausea and vomiting.   Skin:  Positive for rash.   Neurological:  Negative for headaches.   All other systems reviewed and are negative.      Physical Exam     Initial Vitals [24 2101]   BP Pulse Resp Temp SpO2   (!) 176/122 71 16 98.2 °F (36.8 °C) 98 %      MAP        --         Physical Exam    Nursing note and vitals reviewed.  Constitutional: She appears well-developed and well-nourished. No distress.   Cardiovascular:  Normal rate and regular rhythm.           Pulmonary/Chest: Breath sounds normal. No respiratory distress. She has no wheezes. She has no rhonchi. She has no rales.   Abdominal: Abdomen is soft. There is no abdominal tenderness. There is no rebound and no guarding.   Musculoskeletal:         General: No tenderness. Normal range of motion.     Neurological: She is alert and oriented to person, place, and time. She has normal strength.   Skin: Skin is warm. Capillary refill takes less than 2 seconds. Rash (to the left flank consistent with shingles) noted.         ED Course   Procedures  Labs Reviewed - No data to display       Imaging Results    None          Medications - No data to display  Medical Decision Making  Initial Assessment:   Shingles       Differential Diagnosis:   Judging by the patient's chief complaint and pertinent history, the patient has the following possible differential diagnoses, including but not limited to the following.  Some of these are deemed to be lower likelihood and some more likely based on my physical exam and history combined with possible lab work and/or imaging studies.   Please see the pertinent studies, and refer to the HPI.  Some of these diagnoses will take further evaluation to fully rule out, perhaps as an outpatient and the patient was encouraged to follow up when discharged for more comprehensive evaluation.  Shingles, cellulitis, rash,  as well as multiple other possible etiologies      Problems Addressed:  Herpes zoster without complication: acute illness or injury    Risk  Prescription drug management.                                      Clinical Impression:  Final diagnoses:  [B02.9] Herpes zoster without complication (Primary)          ED Disposition Condition    Discharge Stable          ED Prescriptions        Medication Sig Dispense Start Date End Date Auth. Provider    gabapentin (NEURONTIN) 300 MG capsule Take 1 capsule (300 mg total) by mouth 3 (three) times daily. 90 capsule 8/25/2024 8/25/2025 Star Caceres MD    HYDROcodone-acetaminophen (NORCO) 5-325 mg per tablet Take 1 tablet by mouth every 6 (six) hours as needed for Pain. 12 tablet 8/25/2024 8/28/2024 Star Caceres MD          Follow-up Information       Follow up With Specialties Details Why Contact Info    Diana Berry MD Family Medicine Schedule an appointment as soon as possible for a visit   [address] [facility] [address] [phone]      [facility] - Emergency Dept Emergency Medicine Go to  If symptoms worsen [address] [facility] [id number] [phone]             Star Caceres MD  08/25/24 0086

## 2024-09-16 DIAGNOSIS — M35.9 UNDIFFERENTIATED CONNECTIVE TISSUE DISEASE: ICD-10-CM

## 2024-09-16 DIAGNOSIS — M79.7 FIBROMYALGIA SYNDROME: ICD-10-CM

## 2024-09-16 DIAGNOSIS — F32.A DEPRESSION, UNSPECIFIED DEPRESSION TYPE: ICD-10-CM

## 2024-09-16 DIAGNOSIS — F51.01 PRIMARY INSOMNIA: ICD-10-CM

## 2024-09-16 DIAGNOSIS — G47.00 INSOMNIA, UNSPECIFIED TYPE: ICD-10-CM

## 2024-09-16 RX ORDER — PANTOPRAZOLE SODIUM 40 MG/1
40 TABLET, DELAYED RELEASE ORAL DAILY
Qty: 30 TABLET | Refills: 0 | Status: SHIPPED | OUTPATIENT
Start: 2024-09-16

## 2024-09-16 RX ORDER — CITALOPRAM 20 MG/1
20 TABLET, FILM COATED ORAL DAILY
Qty: 30 TABLET | Refills: 0 | Status: SHIPPED | OUTPATIENT
Start: 2024-09-16

## 2024-09-16 NOTE — TELEPHONE ENCOUNTER
Care Due:                  Date            Visit Type   Department     Provider  --------------------------------------------------------------------------------                                ESTABLISHED                              PATIENT -    [facility]  Last Visit: 11-      [employer]      MEDICINE       Diana Berry  Next Visit: None Scheduled  None         None Found                                                            Last  Test          Frequency    Reason                     Performed    Due Date  --------------------------------------------------------------------------------    CBC.........  12 months..  mirtazapine..............  04- 09-    [facility] Embedded Care Due Messages. Reference number: [id number].   9/16/2024 2:49:38 PM CDT

## 2024-10-03 DIAGNOSIS — R63.0 DECREASED APPETITE: ICD-10-CM

## 2024-10-03 DIAGNOSIS — G47.00 INSOMNIA, UNSPECIFIED TYPE: ICD-10-CM

## 2024-10-03 DIAGNOSIS — F32.A DEPRESSION, UNSPECIFIED DEPRESSION TYPE: ICD-10-CM

## 2024-10-03 RX ORDER — MIRTAZAPINE 15 MG/1
15 TABLET, FILM COATED ORAL NIGHTLY
Qty: 90 TABLET | Refills: 0 | Status: SHIPPED | OUTPATIENT
Start: 2024-10-03

## 2024-10-03 NOTE — TELEPHONE ENCOUNTER
No care due was identified.  [facility] Embedded Care Due Messages. Reference number: [id number].   10/03/2024 9:03:26 AM CDT

## 2024-10-09 ENCOUNTER — HOSPITAL ENCOUNTER (EMERGENCY)
Facility: HOSPITAL | Age: 59
Discharge: HOME | End: 2024-10-09
Attending: FAMILY MEDICINE
Payer: MEDICARE

## 2024-10-09 VITALS
BODY MASS INDEX: 29.08 KG/M2 | TEMPERATURE: 98 F | RESPIRATION RATE: 20 BRPM | HEART RATE: 79 BPM | OXYGEN SATURATION: 97 % | WEIGHT: 158.06 LBS | HEIGHT: 62 IN | DIASTOLIC BLOOD PRESSURE: 104 MMHG | SYSTOLIC BLOOD PRESSURE: 184 MMHG

## 2024-10-09 DIAGNOSIS — M54.50 CHRONIC BILATERAL LOW BACK PAIN WITHOUT SCIATICA: Primary | ICD-10-CM

## 2024-10-09 DIAGNOSIS — G89.29 CHRONIC BILATERAL LOW BACK PAIN WITHOUT SCIATICA: Primary | ICD-10-CM

## 2024-10-09 PROCEDURE — 96372 THER/PROPH/DIAG INJ SC/IM: CPT | Performed by: FAMILY MEDICINE

## 2024-10-09 PROCEDURE — 63600175 PHARM REV CODE 636 W HCPCS: Performed by: FAMILY MEDICINE

## 2024-10-09 PROCEDURE — 99284 EMERGENCY DEPT VISIT MOD MDM: CPT | Mod: 25

## 2024-10-09 RX ORDER — HYDROCODONE BITARTRATE AND ACETAMINOPHEN 5; 325 MG/1; MG/1
1 TABLET ORAL EVERY 6 HOURS PRN
Qty: 12 TABLET | Refills: 0 | Status: SHIPPED | OUTPATIENT
Start: 2024-10-09 | End: 2024-10-12

## 2024-10-09 RX ORDER — KETOROLAC TROMETHAMINE 30 MG/ML
30 INJECTION, SOLUTION INTRAMUSCULAR; INTRAVENOUS
Status: COMPLETED | OUTPATIENT
Start: 2024-10-09 | End: 2024-10-09

## 2024-10-09 RX ADMIN — KETOROLAC TROMETHAMINE 30 MG: 30 INJECTION, SOLUTION INTRAMUSCULAR; INTRAVENOUS at 12:10

## 2024-10-09 NOTE — ED PROVIDER NOTES
Encounter Date: 10/8/2024       History     Chief Complaint   Patient presents with    Back Pain     C/O LBP x 2 weeks. Describes as chronic issue dating to 2018. Reports seen for same approx one week ago at outside facility. No objective s/s of acute severe pain as reported by patient's 10/10 rating.     59-year-old female presents emergency room complaints of bilateral lower back pain.  Patient reports that she has a history of chronic lower back pain, but reports symptoms has been worsening over the last 2 weeks.  She was seen in urgent care clinic, and given a muscle relaxer, reports it was not helped with symptoms.  Denies dysuria or hematuria.  Denies constipation or diarrhea.  Denies lower extremity weakness.    The history is provided by the patient.     Review of patient's allergies indicates:  No Known Allergies  Past Medical History:   Diagnosis Date    Anxiety disorder, unspecified     Aphasia 10/26/2017    Degenerative, intervertebral disc, cervical     Depression     GERD (gastroesophageal reflux disease)     Hyperlipidemia     Hypertension     OAB (overactive bladder)     Seizures     Stroke      Past Surgical History:   Procedure Laterality Date    KALEIGH Leg Stents        SECTION      TUBAL LIGATION       Family History   Problem Relation Name Age of Onset    Heart attack Father      Throat cancer Mother      Breast cancer Daughter       Social History     Tobacco Use    Smoking status: Every Day     Current packs/day: 0.50     Average packs/day: 0.5 packs/day for 15.0 years (7.5 ttl pk-yrs)     Types: Cigarettes     Passive exposure: Current    Smokeless tobacco: Never    Tobacco comments:     stop on own   Substance Use Topics    Alcohol use: Yes     Alcohol/week: 1.0 standard drink of alcohol     Types: 1 Cans of beer per week     Comment: every     Drug use: Never     Review of Systems   Constitutional:  Negative for chills, fatigue and fever.   HENT:  Negative for ear pain,  rhinorrhea and sore throat.    Eyes:  Negative for photophobia and pain.   Respiratory:  Negative for cough, shortness of breath and wheezing.    Cardiovascular:  Negative for chest pain.   Gastrointestinal:  Negative for abdominal pain, diarrhea, nausea and vomiting.   Genitourinary:  Negative for dysuria.   Neurological:  Negative for dizziness, weakness and headaches.   All other systems reviewed and are negative.      Physical Exam     Initial Vitals [10/09/24 0003]   BP Pulse Resp Temp SpO2   (!) 184/104 79 20 98.4 °F (36.9 °C) 97 %      MAP       --         Physical Exam    Nursing note and vitals reviewed.  Constitutional: She appears well-developed and well-nourished. No distress.   HENT:   Head: Normocephalic and atraumatic.   Eyes: Conjunctivae and EOM are normal. Pupils are equal, round, and reactive to light.   Neck: Neck supple.   Normal range of motion.  Cardiovascular:  Normal rate, regular rhythm, normal heart sounds and intact distal pulses.     Exam reveals no gallop and no friction rub.       No murmur heard.  Pulmonary/Chest: Breath sounds normal. No respiratory distress. She has no wheezes. She has no rhonchi. She has no rales.   Abdominal: Abdomen is soft. Bowel sounds are normal. She exhibits no distension. There is no abdominal tenderness. There is no rebound and no guarding.   Musculoskeletal:         General: Normal range of motion.      Cervical back: Normal range of motion and neck supple.      Comments: Mild tenderness to palpation bilateral paralumbar region without tenderness to palpation of the lumbar spinal processes.  Ambulates with a steady gait.     Neurological: She is alert and oriented to person, place, and time.   Skin: Skin is warm and dry. Capillary refill takes less than 2 seconds. No erythema.   Psychiatric: She has a normal mood and affect. Her behavior is normal. Judgment and thought content normal.         ED Course   Procedures  Labs Reviewed - No data to display        Imaging Results    None          Medications   ketorolac injection 30 mg (has no administration in time range)     Medical Decision Making    Patient was a 59-year-old female history of chronic lower back pain, presents emergency room with complaints of continued lower back pain.  Patient currently in no distress.  Will give dose of Toradol since patient drove here in the emergency room, but place on [place] for the next few days to help with lower back pain.  Stable for discharge to home.  ER precautions given for any acute worsening     Differential diagnosis: Lower back pain, acute on chronic lower back pain, electrolyte abnormality    Risk  Prescription drug management.                                      Clinical Impression:  Final diagnoses:  [M54.50, G89.29] Chronic bilateral low back pain without sciatica (Primary)          ED Disposition Condition    Discharge Stable          ED Prescriptions       Medication Sig Dispense Start Date End Date Auth. Provider    HYDROcodone-acetaminophen (NORCO) 5-325 mg per tablet Take 1 tablet by mouth every 6 (six) hours as needed for Pain. 12 tablet 10/9/2024 10/12/2024 Everardo Brice MD          Follow-up Information       Follow up With Specialties Details Why Contact Info    Diana Berry MD Family Medicine   [address] [place] [address] [phone]      [facility] - Emergency Dept Emergency Medicine  As needed, If symptoms worsen [address] [facility] [id number] [phone]             Everardo Brice MD  10/09/24 0027

## 2024-10-21 DIAGNOSIS — I63.9 CEREBROVASCULAR ACCIDENT (CVA), UNSPECIFIED MECHANISM: ICD-10-CM

## 2024-10-21 RX ORDER — CLOPIDOGREL BISULFATE 75 MG/1
75 TABLET ORAL DAILY
Qty: 90 TABLET | Refills: 0 | Status: SHIPPED | OUTPATIENT
Start: 2024-10-21

## 2024-10-21 NOTE — TELEPHONE ENCOUNTER
No care due was identified.  [facility] Embedded Care Due Messages. Reference number: [id number].   10/21/2024 12:18:22 PM CDT

## 2024-11-19 ENCOUNTER — HOSPITAL ENCOUNTER (OUTPATIENT)
Dept: RADIOLOGY | Facility: HOSPITAL | Age: 59
Discharge: HOME | End: 2024-11-19
Attending: NURSE PRACTITIONER
Payer: MEDICARE

## 2024-11-19 DIAGNOSIS — Z12.31 SCREENING MAMMOGRAM FOR BREAST CANCER: ICD-10-CM

## 2024-11-19 PROCEDURE — 77063 BREAST TOMOSYNTHESIS BI: CPT | Mod: TC

## 2025-01-15 ENCOUNTER — OFFICE VISIT (OUTPATIENT)
Dept: FAMILY MEDICINE | Facility: CLINIC | Age: 60
End: 2025-01-15
Payer: MEDICARE

## 2025-01-15 VITALS
TEMPERATURE: 99 F | SYSTOLIC BLOOD PRESSURE: 170 MMHG | HEART RATE: 64 BPM | BODY MASS INDEX: 29.44 KG/M2 | OXYGEN SATURATION: 100 % | DIASTOLIC BLOOD PRESSURE: 110 MMHG | HEIGHT: 62 IN | WEIGHT: 160 LBS

## 2025-01-15 DIAGNOSIS — R79.9 ABNORMAL FINDING OF BLOOD CHEMISTRY, UNSPECIFIED: ICD-10-CM

## 2025-01-15 DIAGNOSIS — M79.7 FIBROMYALGIA SYNDROME: ICD-10-CM

## 2025-01-15 DIAGNOSIS — F41.9 ANXIETY DISORDER, UNSPECIFIED: ICD-10-CM

## 2025-01-15 DIAGNOSIS — F41.8 SITUATIONAL ANXIETY: ICD-10-CM

## 2025-01-15 DIAGNOSIS — I10 HYPERTENSION, UNSPECIFIED TYPE: ICD-10-CM

## 2025-01-15 DIAGNOSIS — F32.A DEPRESSION, UNSPECIFIED DEPRESSION TYPE: ICD-10-CM

## 2025-01-15 DIAGNOSIS — R63.0 DECREASED APPETITE: ICD-10-CM

## 2025-01-15 DIAGNOSIS — Z86.73 HISTORY OF STROKE: ICD-10-CM

## 2025-01-15 DIAGNOSIS — R79.89 LOW VITAMIN D LEVEL: ICD-10-CM

## 2025-01-15 DIAGNOSIS — Z00.00 WELLNESS EXAMINATION: Primary | ICD-10-CM

## 2025-01-15 DIAGNOSIS — I10 PRIMARY HYPERTENSION: ICD-10-CM

## 2025-01-15 DIAGNOSIS — G47.00 INSOMNIA, UNSPECIFIED TYPE: ICD-10-CM

## 2025-01-15 DIAGNOSIS — G40.909 SEIZURE DISORDER: ICD-10-CM

## 2025-01-15 DIAGNOSIS — M35.9 UNDIFFERENTIATED CONNECTIVE TISSUE DISEASE: ICD-10-CM

## 2025-01-15 DIAGNOSIS — R06.01 ORTHOPNEA: ICD-10-CM

## 2025-01-15 DIAGNOSIS — E78.5 HYPERLIPIDEMIA, UNSPECIFIED HYPERLIPIDEMIA TYPE: ICD-10-CM

## 2025-01-15 DIAGNOSIS — F51.01 PRIMARY INSOMNIA: ICD-10-CM

## 2025-01-15 DIAGNOSIS — F17.200 TOBACCO DEPENDENCE SYNDROME: ICD-10-CM

## 2025-01-15 DIAGNOSIS — I63.9 CEREBROVASCULAR ACCIDENT (CVA), UNSPECIFIED MECHANISM: ICD-10-CM

## 2025-01-15 LAB
ALBUMIN SERPL-MCNC: 3.7 G/DL (ref 3.5–5)
ALBUMIN/GLOB SERPL: 1.2 RATIO (ref 1.1–2)
ALP SERPL-CCNC: 83 UNIT/L (ref 40–150)
ALT SERPL-CCNC: 22 UNIT/L (ref 0–55)
ANION GAP SERPL CALC-SCNC: 6 MEQ/L
AST SERPL-CCNC: 27 UNIT/L (ref 5–34)
BASOPHILS # BLD AUTO: 0.04 X10(3)/MCL
BASOPHILS NFR BLD AUTO: 0.7 %
BILIRUB SERPL-MCNC: 0.5 MG/DL
BUN SERPL-MCNC: 12.4 MG/DL (ref 9.8–20.1)
CALCIUM SERPL-MCNC: 9.3 MG/DL (ref 8.4–10.2)
CHLORIDE SERPL-SCNC: 108 MMOL/L (ref 98–107)
CHOLEST SERPL-MCNC: 243 MG/DL
CHOLEST/HDLC SERPL: 4 {RATIO} (ref 0–5)
CO2 SERPL-SCNC: 28 MMOL/L (ref 22–29)
CREAT SERPL-MCNC: 0.99 MG/DL (ref 0.55–1.02)
CREAT UR-MCNC: 96 MG/DL (ref 45–106)
CREAT/UREA NIT SERPL: 13
EOSINOPHIL # BLD AUTO: 0.23 X10(3)/MCL (ref 0–0.9)
EOSINOPHIL NFR BLD AUTO: 4.2 %
ERYTHROCYTE [DISTWIDTH] IN BLOOD BY AUTOMATED COUNT: 14 % (ref 11.5–17)
EST. AVERAGE GLUCOSE BLD GHB EST-MCNC: 105.4 MG/DL
GFR SERPLBLD CREATININE-BSD FMLA CKD-EPI: >60 ML/MIN/1.73/M2
GLOBULIN SER-MCNC: 3.2 GM/DL (ref 2.4–3.5)
GLUCOSE SERPL-MCNC: 71 MG/DL (ref 74–100)
HBA1C MFR BLD: 5.3 %
HCT VFR BLD AUTO: 47.1 % (ref 37–47)
HDLC SERPL-MCNC: 59 MG/DL (ref 35–60)
HGB BLD-MCNC: 15.6 G/DL (ref 12–16)
IMM GRANULOCYTES # BLD AUTO: 0.02 X10(3)/MCL (ref 0–0.04)
IMM GRANULOCYTES NFR BLD AUTO: 0.4 %
LDLC SERPL CALC-MCNC: 168 MG/DL (ref 50–140)
LYMPHOCYTES # BLD AUTO: 1.27 X10(3)/MCL (ref 0.6–4.6)
LYMPHOCYTES NFR BLD AUTO: 23.2 %
MCH RBC QN AUTO: 30.3 PG (ref 27–31)
MCHC RBC AUTO-ENTMCNC: 33.1 G/DL (ref 33–36)
MCV RBC AUTO: 91.5 FL (ref 80–94)
MICROALBUMIN UR-MCNC: 6.9 UG/ML
MICROALBUMIN/CREAT RATIO PNL UR: 7.2 MG/GM CR (ref 0–30)
MONOCYTES # BLD AUTO: 0.51 X10(3)/MCL (ref 0.1–1.3)
MONOCYTES NFR BLD AUTO: 9.3 %
NEUTROPHILS # BLD AUTO: 3.4 X10(3)/MCL (ref 2.1–9.2)
NEUTROPHILS NFR BLD AUTO: 62.2 %
NRBC BLD AUTO-RTO: 0 %
PLATELET # BLD AUTO: 275 X10(3)/MCL (ref 130–400)
PMV BLD AUTO: 9.8 FL (ref 7.4–10.4)
POTASSIUM SERPL-SCNC: 4.5 MMOL/L (ref 3.5–5.1)
PROT SERPL-MCNC: 6.9 GM/DL (ref 6.4–8.3)
RBC # BLD AUTO: 5.15 X10(6)/MCL (ref 4.2–5.4)
SODIUM SERPL-SCNC: 142 MMOL/L (ref 136–145)
T4 FREE SERPL-MCNC: 0.98 NG/DL (ref 0.7–1.48)
TRIGL SERPL-MCNC: 80 MG/DL (ref 37–140)
TSH SERPL-ACNC: 1.83 UIU/ML (ref 0.35–4.94)
VLDLC SERPL CALC-MCNC: 16 MG/DL
WBC # BLD AUTO: 5.47 X10(3)/MCL (ref 4.5–11.5)

## 2025-01-15 PROCEDURE — 80053 COMPREHEN METABOLIC PANEL: CPT | Performed by: STUDENT IN AN ORGANIZED HEALTH CARE EDUCATION/TRAINING PROGRAM

## 2025-01-15 PROCEDURE — 3077F SYST BP >= 140 MM HG: CPT | Mod: CPTII,,, | Performed by: STUDENT IN AN ORGANIZED HEALTH CARE EDUCATION/TRAINING PROGRAM

## 2025-01-15 PROCEDURE — 3061F NEG MICROALBUMINURIA REV: CPT | Mod: CPTII,,, | Performed by: STUDENT IN AN ORGANIZED HEALTH CARE EDUCATION/TRAINING PROGRAM

## 2025-01-15 PROCEDURE — 85025 COMPLETE CBC W/AUTO DIFF WBC: CPT | Performed by: STUDENT IN AN ORGANIZED HEALTH CARE EDUCATION/TRAINING PROGRAM

## 2025-01-15 PROCEDURE — 3066F NEPHROPATHY DOC TX: CPT | Mod: CPTII,,, | Performed by: STUDENT IN AN ORGANIZED HEALTH CARE EDUCATION/TRAINING PROGRAM

## 2025-01-15 PROCEDURE — 36415 COLL VENOUS BLD VENIPUNCTURE: CPT | Performed by: STUDENT IN AN ORGANIZED HEALTH CARE EDUCATION/TRAINING PROGRAM

## 2025-01-15 PROCEDURE — 80061 LIPID PANEL: CPT | Performed by: STUDENT IN AN ORGANIZED HEALTH CARE EDUCATION/TRAINING PROGRAM

## 2025-01-15 PROCEDURE — 83036 HEMOGLOBIN GLYCOSYLATED A1C: CPT | Performed by: STUDENT IN AN ORGANIZED HEALTH CARE EDUCATION/TRAINING PROGRAM

## 2025-01-15 PROCEDURE — 84443 ASSAY THYROID STIM HORMONE: CPT | Performed by: STUDENT IN AN ORGANIZED HEALTH CARE EDUCATION/TRAINING PROGRAM

## 2025-01-15 PROCEDURE — 1159F MED LIST DOCD IN RCRD: CPT | Mod: CPTII,,, | Performed by: STUDENT IN AN ORGANIZED HEALTH CARE EDUCATION/TRAINING PROGRAM

## 2025-01-15 PROCEDURE — 99214 OFFICE O/P EST MOD 30 MIN: CPT | Mod: S$PBB,,, | Performed by: STUDENT IN AN ORGANIZED HEALTH CARE EDUCATION/TRAINING PROGRAM

## 2025-01-15 PROCEDURE — 3008F BODY MASS INDEX DOCD: CPT | Mod: CPTII,,, | Performed by: STUDENT IN AN ORGANIZED HEALTH CARE EDUCATION/TRAINING PROGRAM

## 2025-01-15 PROCEDURE — 3080F DIAST BP >= 90 MM HG: CPT | Mod: CPTII,,, | Performed by: STUDENT IN AN ORGANIZED HEALTH CARE EDUCATION/TRAINING PROGRAM

## 2025-01-15 PROCEDURE — 3044F HG A1C LEVEL LT 7.0%: CPT | Mod: CPTII,,, | Performed by: STUDENT IN AN ORGANIZED HEALTH CARE EDUCATION/TRAINING PROGRAM

## 2025-01-15 PROCEDURE — 99214 OFFICE O/P EST MOD 30 MIN: CPT | Mod: PBBFAC,PN | Performed by: STUDENT IN AN ORGANIZED HEALTH CARE EDUCATION/TRAINING PROGRAM

## 2025-01-15 PROCEDURE — 84439 ASSAY OF FREE THYROXINE: CPT | Performed by: STUDENT IN AN ORGANIZED HEALTH CARE EDUCATION/TRAINING PROGRAM

## 2025-01-15 PROCEDURE — 82570 ASSAY OF URINE CREATININE: CPT | Performed by: STUDENT IN AN ORGANIZED HEALTH CARE EDUCATION/TRAINING PROGRAM

## 2025-01-15 RX ORDER — MIRTAZAPINE 15 MG/1
15 TABLET, FILM COATED ORAL NIGHTLY
Qty: 90 TABLET | Refills: 0 | Status: SHIPPED | OUTPATIENT
Start: 2025-01-15

## 2025-01-15 RX ORDER — PANTOPRAZOLE SODIUM 40 MG/1
40 TABLET, DELAYED RELEASE ORAL DAILY
Qty: 90 TABLET | Refills: 0 | Status: SHIPPED | OUTPATIENT
Start: 2025-01-15

## 2025-01-15 RX ORDER — CLOPIDOGREL BISULFATE 75 MG/1
75 TABLET ORAL DAILY
Qty: 90 TABLET | Refills: 0 | Status: SHIPPED | OUTPATIENT
Start: 2025-01-15

## 2025-01-15 RX ORDER — CITALOPRAM 20 MG/1
20 TABLET, FILM COATED ORAL DAILY
Qty: 90 TABLET | Refills: 0 | Status: SHIPPED | OUTPATIENT
Start: 2025-01-15

## 2025-01-15 RX ORDER — ATORVASTATIN CALCIUM 40 MG/1
40 TABLET, FILM COATED ORAL DAILY
Qty: 90 TABLET | Refills: 0 | Status: SHIPPED | OUTPATIENT
Start: 2025-01-15 | End: 2026-01-15

## 2025-01-15 RX ORDER — AMLODIPINE BESYLATE 10 MG/1
10 TABLET ORAL DAILY
Qty: 90 TABLET | Refills: 0 | Status: SHIPPED | OUTPATIENT
Start: 2025-01-15

## 2025-01-15 NOTE — ASSESSMENT & PLAN NOTE
Blood pressure is not controlled encourage patient to take amlodipine 10 mg daily   Also discussed with patient that medication may need to be changed to nifedipine which will allow higher dosing

## 2025-01-15 NOTE — PROGRESS NOTES
Patient Name: Chloé Willams     : 1965    MRN: [id number]     Subjective:     Patient ID: Chloé Willams is a 59 y.o. female.    Chief Complaint:   Chief Complaint   Patient presents with    Annual Exam     Patient here for medication refills. Wants all meds refilled. Bp done twice 164/106 then 170/110. States that she did not take her medication.         HPI: HPI  59-year-old female presents to clinic for medication refills.    Of note patient has not been seen in clinic since 2023 and has missed several appointments    Acute complaint/sob  Patient is complaining of shortness a breath especially at night when she lies down to go to sleep.    Reports that she uses 2-3 pillows to elevate herself  Reports that she has not seeing a cardiologist's since her stroke  No other associated symptoms  Back Pain  8/10 Dull and aching and sharp at times.   Sees Ronel Rowley Is on Plaquenil and methotrexate  DINESH/CeNp  Continues with right hand nodules and pain         Depression/Anxiety   Celexa 20 mg .  Patient reports that this is doing helping and would like to continue on dose at this time.     Seizures  Takes Keppra 500 mg BID  Has been referred to neurology          HTN  Amlodipine 10 mg  Patient reports not taking her blood pressure medication today   Blood pressure is 170/110   Patient also reports that she eats a low-salt diet     Insomnia  Remeron 15 mg for sleep and appetite      GERD/Constipation/Decreased appetite  Protonix 40 mg      HLD/CVA  Atorvastatin 40 mg  Plavix 75 mg     ROS:      ROS     12 point review of systems conducted, negative except as stated in the history of present illness. See HPI for details.    History:     Past Medical History:   Diagnosis Date    Anxiety disorder, unspecified     Aphasia 10/26/2017    Degenerative, intervertebral disc, cervical     Depression     GERD (gastroesophageal reflux disease)     Hyperlipidemia     Hypertension     OAB (overactive  bladder)     Seizures     Stroke         Past Surgical History:   Procedure Laterality Date    KALEIGH Leg Stents        SECTION      TUBAL LIGATION         Family History   Problem Relation Name Age of Onset    Heart attack Father      Throat cancer Mother      Breast cancer Daughter          Social History     Tobacco Use    Smoking status: Every Day     Current packs/day: 0.50     Average packs/day: 0.5 packs/day for 15.0 years (7.5 ttl pk-yrs)     Types: Cigarettes     Passive exposure: Current    Smokeless tobacco: Never    Tobacco comments:     stop on own   Substance and Sexual Activity    Alcohol use: Yes     Alcohol/week: 1.0 standard drink of alcohol     Types: 1 Cans of beer per week     Comment: every     Drug use: Never    Sexual activity: Not Currently     Partners: Male     Birth control/protection: See Surgical Hx       Current Outpatient Medications   Medication Instructions    albuterol (PROVENTIL/VENTOLIN HFA) 90 mcg/actuation inhaler INHALE 2 PUFFS BY MOUTH EVERY 4 HOURS AS NEEDED FOR COUGH    amLODIPine (NORVASC) 10 mg, Oral, Daily    aspirin (ECOTRIN) 81 mg    atorvastatin (LIPITOR) 40 mg, Oral, Daily    citalopram (CELEXA) 20 mg, Oral, Daily    clopidogreL (PLAVIX) 75 mg, Oral, Daily    folic acid (FOLVITE) 1 mg, Oral, Daily, After food    hydroxychloroquine (PLAQUENIL) 300 mg, Oral, Daily    hydrOXYzine pamoate (VISTARIL) 50 MG Cap One tablet nightly as needed    levETIRAcetam (KEPPRA) 500 mg, Oral, 2 times daily    methocarbamoL (ROBAXIN) 1,500 mg, 3 times daily    methotrexate 10 mg, Oral, Every 7 days, EVERY  FRIDAY      TAKE 4 TAB TOGETHER AFTER SUPPER    mirtazapine (REMERON) 15 mg, Oral, Nightly    oxybutynin (DITROPAN-XL) 5 mg, Oral, Daily    pantoprazole (PROTONIX) 40 mg, Oral, Daily    triamcinolone acetonide 0.1% (KENALOG) 0.1 % ointment Topical (Top), 2 times daily        Review of patient's allergies indicates:  No Known Allergies    Objective:     Visit Vitals  BP (!)  "170/110 (BP Location: Right arm, Patient Position: Sitting)   Pulse 64   Temp 98.6 °F (37 °C) (Oral)   Ht 5' 2" (1.575 m)   Wt 72.6 kg (160 lb)   LMP  (LMP Unknown)   SpO2 100%   BMI 29.26 kg/m²       Physical Examination:     Physical Exam  Constitutional:       General: She is not in acute distress.     Appearance: Normal appearance. She is not ill-appearing or diaphoretic.   HENT:      Nose: No congestion.   Eyes:      General: No scleral icterus.     Conjunctiva/sclera: Conjunctivae normal.   Cardiovascular:      Rate and Rhythm: Normal rate and regular rhythm.      Heart sounds: No murmur heard.  Pulmonary:      Effort: Pulmonary effort is normal. No respiratory distress.      Breath sounds: Normal breath sounds. No wheezing.   Musculoskeletal:         General: No swelling, tenderness, deformity or signs of injury. Normal range of motion.      Cervical back: Normal range of motion.   Skin:     General: Skin is warm and dry.      Coloration: Skin is not jaundiced.   Neurological:      General: No focal deficit present.      Mental Status: She is alert and oriented to person, place, and time. Mental status is at baseline.      Gait: Gait normal.         Lab Results:     Chemistry:  Lab Results   Component Value Date     01/15/2025    K 4.5 01/15/2025    BUN 12.4 01/15/2025    CREATININE 0.99 01/15/2025    EGFRNORACEVR >60 01/15/2025    GLUCOSE 71 (L) 01/15/2025    CALCIUM 9.3 01/15/2025    ALKPHOS 83 01/15/2025    LABPROT 6.9 01/15/2025    ALBUMIN 3.7 01/15/2025    BILIDIR 0.2 03/13/2022    IBILI 0.30 03/13/2022    AST 27 01/15/2025    ALT 22 01/15/2025    MG 2.20 01/23/2022    PHOS 2.9 05/19/2018    SABKOZAH39XF 20.4 (L) 12/13/2022    TSH 1.827 01/15/2025    ORRSFI9MJBF 0.98 01/15/2025        Lab Results   Component Value Date    HGBA1C 5.3 01/15/2025        Hematology:  Lab Results   Component Value Date    WBC 5.47 01/15/2025    HGB 15.6 01/15/2025    HCT 47.1 (H) 01/15/2025     01/15/2025 "       Lipid Panel:  Lab Results   Component Value Date    CHOL 243 (H) 01/15/2025    HDL 59 01/15/2025    .00 (H) 01/15/2025    TRIG 80 01/15/2025    TOTALCHOLEST 4 01/15/2025        Urine:  Lab Results   Component Value Date    APPEARANCEUA Clear 01/31/2024    SGUA 1.020 01/31/2024    PROTEINUA Negative 01/31/2024    KETONESUA Negative 01/31/2024    LEUKOCYTESUR 250 (A) 01/31/2024    RBCUA 6-10 (A) 01/31/2024    WBCUA 11-20 (A) 01/31/2024    BACTERIA Occasional (A) 01/31/2024    SQEPUA Trace 01/31/2024    HYALINECASTS None Seen 02/16/2023    CREATRANDUR 96.0 01/15/2025    PROTEINURINE <6.8 01/31/2024        Assessment:          ICD-10-CM ICD-9-CM   1. Wellness examination  Z00.00 V70.0   2. Primary hypertension  I10 401.9   3. Low vitamin D level  R79.89 790.6   4. Tobacco dependence syndrome  F17.200 305.1   5. Orthopnea  R06.01 786.02   6. Abnormal finding of blood chemistry, unspecified  R79.9 790.6   7. Anxiety disorder, unspecified  F41.9 300.00   8. Hypertension, unspecified type  I10 401.9   9. Hyperlipidemia, unspecified hyperlipidemia type  E78.5 272.4   10. Depression, unspecified depression type  F32.A 311   11. Cerebrovascular accident (CVA), unspecified mechanism  I63.9 434.91   12. Decreased appetite  R63.0 783.0   13. Insomnia, unspecified type  G47.00 780.52   14. Undifferentiated connective tissue disease  M35.9 710.9   15. Fibromyalgia syndrome  M79.7 729.1   16. Primary insomnia  F51.01 307.42   17. Seizure disorder  G40.909 345.90   18. History of stroke  Z86.73 V12.54   19. Situational anxiety  F41.8 300.09        Plan:     1. Wellness examination  -     CBC Auto Differential; Future; Expected date: 01/15/2025  -     Lipid Panel; Future; Expected date: 01/15/2025  -     TSH; Future; Expected date: 01/15/2025  -     Hemoglobin A1C; Future; Expected date: 01/15/2025  -     T4, Free; Future; Expected date: 01/15/2025    2. Primary hypertension  Overview:  Continue amlodipine 10 mg  daily      Orders:  -     Comprehensive Metabolic Panel; Future; Expected date: 01/15/2025  -     Microalbumin/Creatinine Ratio, Urine    3. Low vitamin D level  Overview:  Vit D 3 47183 units q week x 12 weeks      4. Tobacco dependence syndrome    5. Orthopnea  Assessment & Plan:  Referring patient to Cardiology ED precautions    Orders:  -     Ambulatory referral/consult to Cardiology; Future; Expected date: 01/22/2025    6. Abnormal finding of blood chemistry, unspecified  -     CBC Auto Differential; Future; Expected date: 01/15/2025  -     Lipid Panel; Future; Expected date: 01/15/2025  -     Hemoglobin A1C; Future; Expected date: 01/15/2025    7. Anxiety disorder, unspecified  -     TSH; Future; Expected date: 01/15/2025  -     T4, Free; Future; Expected date: 01/15/2025    8. Hypertension, unspecified type  Assessment & Plan:  Blood pressure is not controlled encourage patient to take amlodipine 10 mg daily   Also discussed with patient that medication may need to be changed to nifedipine which will allow higher dosing       Orders:  -     amLODIPine (NORVASC) 10 MG tablet; Take 1 tablet (10 mg total) by mouth once daily.  Dispense: 90 tablet; Refill: 0    9. Hyperlipidemia, unspecified hyperlipidemia type  -     atorvastatin (LIPITOR) 40 MG tablet; Take 1 tablet (40 mg total) by mouth once daily.  Dispense: 90 tablet; Refill: 0    10. Depression, unspecified depression type  -     citalopram (CELEXA) 20 MG tablet; Take 1 tablet (20 mg total) by mouth once daily.  Dispense: 90 tablet; Refill: 0  -     mirtazapine (REMERON) 15 MG tablet; Take 1 tablet (15 mg total) by mouth every evening.  Dispense: 90 tablet; Refill: 0    11. Cerebrovascular accident (CVA), unspecified mechanism  -     clopidogreL (PLAVIX) 75 mg tablet; Take 1 tablet (75 mg total) by mouth once daily.  Dispense: 90 tablet; Refill: 0    12. Decreased appetite  -     mirtazapine (REMERON) 15 MG tablet; Take 1 tablet (15 mg total) by mouth  every evening.  Dispense: 90 tablet; Refill: 0    13. Insomnia, unspecified type  Overview:  Remeron 15 mg  Adjunct for depression, insomnia and appetite     Orders:  -     mirtazapine (REMERON) 15 MG tablet; Take 1 tablet (15 mg total) by mouth every evening.  Dispense: 90 tablet; Refill: 0  -     pantoprazole (PROTONIX) 40 MG tablet; Take 1 tablet (40 mg total) by mouth once daily.  Dispense: 90 tablet; Refill: 0    14. Undifferentiated connective tissue disease  -     pantoprazole (PROTONIX) 40 MG tablet; Take 1 tablet (40 mg total) by mouth once daily.  Dispense: 90 tablet; Refill: 0    15. Fibromyalgia syndrome  -     pantoprazole (PROTONIX) 40 MG tablet; Take 1 tablet (40 mg total) by mouth once daily.  Dispense: 90 tablet; Refill: 0    16. Primary insomnia  Overview:  Remeron 15 mg  Adjunct for depression, insomnia and appetite     Orders:  -     pantoprazole (PROTONIX) 40 MG tablet; Take 1 tablet (40 mg total) by mouth once daily.  Dispense: 90 tablet; Refill: 0    17. Seizure disorder    18. History of stroke  Assessment & Plan:  Continue Plavix and statin      19. Situational anxiety  Assessment & Plan:  Continue Celexa           Follow up in about 1 month (around 2/15/2025) for Hypertension, BP check.    Future Appointments   Date Time Provider Department Center   2/19/2025  9:45 AM NURSE, [facility] MED [facility]   4/30/2025  9:50 AM Criselda Diaz FNP [facility] GYN [facility]        Diana Berry MD

## 2025-01-17 ENCOUNTER — PATIENT MESSAGE (OUTPATIENT)
Dept: FAMILY MEDICINE | Facility: CLINIC | Age: 60
End: 2025-01-17
Payer: MEDICARE

## 2025-02-20 ENCOUNTER — PATIENT OUTREACH (OUTPATIENT)
Facility: CLINIC | Age: 60
End: 2025-02-20
Payer: MEDICARE

## 2025-02-20 NOTE — PROGRESS NOTES
Health Maintenance Topic(s) Outreach Outcomes & Actions Taken:    Blood Pressure - Outreach Outcomes & Actions Taken  : No BP monitor at home    Medication Adherence / Statins - Outreach Outcomes & Actions Taken  : Statin med non compliance per PDC.     Tobacco History - Outreach Outcomes & Actions Taken  : Tobacco History Updated, Pt is a Current Smoker and Other    Everyday. Not ready to quit     Additional Notes:  HTN: uncontrolled. Amlodipine. PDC 95%    Statin Therapy for prevention of CVD: Atorvastatin PDC 1%. Encouraged med as prescribed to prevent stroke or MI.          Care Management, Digital Medicine, and/or Education Referrals      Next Steps - Referral Actions: Digital Medicine Outcomes and Actions Taken: Needs review.

## 2025-02-20 NOTE — Clinical Note
FYI. Uncontrolled HTN. Attempt made for remote BP. Pt states no BP monitor at home. Note non compliance with statin med. Encouraged pt to take HTN & statin med daily as prescribed to prevent stroke or MI. Understanding verbalized. LV 1/15/2025. Pt missed appt for BP check yesterday.

## 2025-04-13 ENCOUNTER — HOSPITAL ENCOUNTER (EMERGENCY)
Facility: HOSPITAL | Age: 60
Discharge: HOME | End: 2025-04-14
Attending: INTERNAL MEDICINE
Payer: MEDICARE

## 2025-04-13 DIAGNOSIS — I10 HYPERTENSION, UNSPECIFIED TYPE: ICD-10-CM

## 2025-04-13 DIAGNOSIS — F10.920 ALCOHOLIC INTOXICATION WITHOUT COMPLICATION: Primary | ICD-10-CM

## 2025-04-13 DIAGNOSIS — R06.02 SHORTNESS OF BREATH: ICD-10-CM

## 2025-04-13 LAB
BASOPHILS # BLD AUTO: 0.04 X10(3)/MCL
BASOPHILS NFR BLD AUTO: 0.7 %
EOSINOPHIL # BLD AUTO: 0.33 X10(3)/MCL (ref 0–0.9)
EOSINOPHIL NFR BLD AUTO: 5.4 %
ERYTHROCYTE [DISTWIDTH] IN BLOOD BY AUTOMATED COUNT: 13.9 % (ref 11.5–17)
HCT VFR BLD AUTO: 45.6 % (ref 37–47)
HGB BLD-MCNC: 15.1 G/DL (ref 12–16)
IMM GRANULOCYTES # BLD AUTO: 0.01 X10(3)/MCL (ref 0–0.04)
IMM GRANULOCYTES NFR BLD AUTO: 0.2 %
LYMPHOCYTES # BLD AUTO: 1.83 X10(3)/MCL (ref 0.6–4.6)
LYMPHOCYTES NFR BLD AUTO: 30.1 %
MCH RBC QN AUTO: 30.1 PG (ref 27–31)
MCHC RBC AUTO-ENTMCNC: 33.1 G/DL (ref 33–36)
MCV RBC AUTO: 91 FL (ref 80–94)
MONOCYTES # BLD AUTO: 0.53 X10(3)/MCL (ref 0.1–1.3)
MONOCYTES NFR BLD AUTO: 8.7 %
NEUTROPHILS # BLD AUTO: 3.34 X10(3)/MCL (ref 2.1–9.2)
NEUTROPHILS NFR BLD AUTO: 54.9 %
NRBC BLD AUTO-RTO: 0 %
PLATELET # BLD AUTO: 234 X10(3)/MCL (ref 130–400)
PMV BLD AUTO: 10 FL (ref 7.4–10.4)
RBC # BLD AUTO: 5.01 X10(6)/MCL (ref 4.2–5.4)
WBC # BLD AUTO: 6.08 X10(3)/MCL (ref 4.5–11.5)

## 2025-04-13 PROCEDURE — 93010 ELECTROCARDIOGRAM REPORT: CPT | Mod: ,,, | Performed by: STUDENT IN AN ORGANIZED HEALTH CARE EDUCATION/TRAINING PROGRAM

## 2025-04-13 PROCEDURE — 80053 COMPREHEN METABOLIC PANEL: CPT | Performed by: INTERNAL MEDICINE

## 2025-04-13 PROCEDURE — 99285 EMERGENCY DEPT VISIT HI MDM: CPT | Mod: 25

## 2025-04-13 PROCEDURE — 93005 ELECTROCARDIOGRAM TRACING: CPT

## 2025-04-13 PROCEDURE — 83735 ASSAY OF MAGNESIUM: CPT | Performed by: INTERNAL MEDICINE

## 2025-04-13 PROCEDURE — 84484 ASSAY OF TROPONIN QUANT: CPT | Performed by: INTERNAL MEDICINE

## 2025-04-13 PROCEDURE — 85025 COMPLETE CBC W/AUTO DIFF WBC: CPT | Performed by: INTERNAL MEDICINE

## 2025-04-13 PROCEDURE — 83880 ASSAY OF NATRIURETIC PEPTIDE: CPT | Performed by: INTERNAL MEDICINE

## 2025-04-13 PROCEDURE — 82077 ASSAY SPEC XCP UR&BREATH IA: CPT | Performed by: INTERNAL MEDICINE

## 2025-04-14 VITALS
OXYGEN SATURATION: 96 % | TEMPERATURE: 99 F | RESPIRATION RATE: 18 BRPM | WEIGHT: 163.63 LBS | BODY MASS INDEX: 29.92 KG/M2 | SYSTOLIC BLOOD PRESSURE: 114 MMHG | HEART RATE: 69 BPM | DIASTOLIC BLOOD PRESSURE: 83 MMHG

## 2025-04-14 LAB
ALBUMIN SERPL-MCNC: 4.1 G/DL (ref 3.5–5)
ALBUMIN/GLOB SERPL: 1.2 RATIO (ref 1.1–2)
ALP SERPL-CCNC: 92 UNIT/L (ref 40–150)
ALT SERPL-CCNC: 13 UNIT/L (ref 0–55)
ANION GAP SERPL CALC-SCNC: 11 MEQ/L
AST SERPL-CCNC: 18 UNIT/L (ref 11–45)
BILIRUB SERPL-MCNC: 0.5 MG/DL
BNP BLD-MCNC: 12.7 PG/ML
BUN SERPL-MCNC: 10 MG/DL (ref 9.8–20.1)
CALCIUM SERPL-MCNC: 8.9 MG/DL (ref 8.4–10.2)
CHLORIDE SERPL-SCNC: 109 MMOL/L (ref 98–107)
CO2 SERPL-SCNC: 24 MMOL/L (ref 22–29)
CREAT SERPL-MCNC: 0.98 MG/DL (ref 0.55–1.02)
CREAT/UREA NIT SERPL: 10
ETHANOL SERPL-MCNC: 124 MG/DL
GFR SERPLBLD CREATININE-BSD FMLA CKD-EPI: >60 ML/MIN/1.73/M2
GLOBULIN SER-MCNC: 3.4 GM/DL (ref 2.4–3.5)
GLUCOSE SERPL-MCNC: 88 MG/DL (ref 74–100)
MAGNESIUM SERPL-MCNC: 2.2 MG/DL (ref 1.6–2.6)
POTASSIUM SERPL-SCNC: 3.6 MMOL/L (ref 3.5–5.1)
PROT SERPL-MCNC: 7.5 GM/DL (ref 6.4–8.3)
SODIUM SERPL-SCNC: 144 MMOL/L (ref 136–145)
TROPONIN I SERPL-MCNC: <0.01 NG/ML (ref 0–0.04)

## 2025-04-14 PROCEDURE — 25000003 PHARM REV CODE 250: Performed by: INTERNAL MEDICINE

## 2025-04-14 RX ORDER — CLONIDINE HYDROCHLORIDE 0.2 MG/1
0.2 TABLET ORAL
Status: COMPLETED | OUTPATIENT
Start: 2025-04-14 | End: 2025-04-14

## 2025-04-14 RX ADMIN — CLONIDINE HYDROCHLORIDE 0.2 MG: 0.2 TABLET ORAL at 12:04

## 2025-04-14 NOTE — DISCHARGE INSTRUCTIONS
Take medicines as prescribed    See your family doctor in one to 2 days for further evaluation, workup, and treatment as necessary    Avoid driving or operating machinery while taking medicines as some medicines might cause drowsiness and may cause problems. Also pain medicines have potential of being addictive  so use Pain meds specially Narcotics Sparingly.    The exam and treatment you received in Emergency Room was for an urgent problem and NOT INTENDED AS COMPLETE CARE. It is important that you FOLLOW UP with a doctor for ongoing care. If your symptoms become WORSE or you DO NOT IMPROVE and you are unable to reach your health care provider, you should RETURN to the emergency department. The Emergency Room doctor has provided a PRELIMINARY INTERPRETATION of all your STUDIES. A final interpretation may be done after you are discharged. IF A CHANGE in your diagnosis or treatment is needed WE WILL CONTACT YOU. It is critical that we have a CURRENT PHONE NUMBER FOR YOU.       You have been referred to the [facility], and a team member will reach out to you soon. If you don't receive a call within seven days of discharge and are ready to quit smoking, please feel free to contact [facility] directly at [phone] or toll-free at [phone].

## 2025-04-14 NOTE — ED PROVIDER NOTES
Encounter Date: 2025       History     Chief Complaint   Patient presents with    Shortness of Breath     Cough x2 days, and SOB today.      59-year-old black female states that she has been short of breath for the past couple of days and was drinking and she would pull tonight and be given having worsening shortness of breaths so she came to the ER for evaluation      Review of patient's allergies indicates:  No Known Allergies  Past Medical History:   Diagnosis Date    Anxiety disorder, unspecified     Aphasia 10/26/2017    Degenerative, intervertebral disc, cervical     Depression     GERD (gastroesophageal reflux disease)     Hyperlipidemia     Hypertension     OAB (overactive bladder)     Seizures     Stroke      Past Surgical History:   Procedure Laterality Date    KALEIGH Leg Stents        SECTION      TUBAL LIGATION       Family History   Problem Relation Name Age of Onset    Heart attack Father      Throat cancer Mother      Breast cancer Daughter       Social History[1]  Review of Systems   Constitutional: Negative.  Negative for activity change, appetite change, chills, diaphoresis, fatigue, fever and unexpected weight change.   HENT: Negative.  Negative for congestion, dental problem, drooling, ear discharge, ear pain, facial swelling, hearing loss, mouth sores, nosebleeds, postnasal drip, rhinorrhea, sinus pressure, sinus pain, sneezing, sore throat, tinnitus, trouble swallowing and voice change.    Eyes: Negative.  Negative for photophobia, pain, discharge, redness, itching and visual disturbance.   Respiratory:  Positive for shortness of breath. Negative for apnea, cough, choking, chest tightness, wheezing and stridor.    Cardiovascular: Negative.  Negative for chest pain, palpitations and leg swelling.   Gastrointestinal: Negative.  Negative for abdominal distention, abdominal pain, anal bleeding, blood in stool, constipation, diarrhea, nausea, rectal pain and vomiting.   Endocrine:  Negative.  Negative for cold intolerance, heat intolerance, polydipsia, polyphagia and polyuria.   Genitourinary: Negative.  Negative for decreased urine volume, difficulty urinating, dyspareunia, dysuria, enuresis, flank pain, frequency, genital sores, hematuria, menstrual problem, pelvic pain, urgency, vaginal bleeding, vaginal discharge and vaginal pain.   Musculoskeletal: Negative.  Negative for arthralgias, back pain, gait problem, joint swelling, myalgias, neck pain and neck stiffness.   Skin: Negative.  Negative for color change, pallor, rash and wound.   Allergic/Immunologic: Negative.  Negative for environmental allergies, food allergies and immunocompromised state.   Neurological: Negative.  Negative for dizziness, tremors, seizures, syncope, facial asymmetry, speech difficulty, weakness, light-headedness, numbness and headaches.   Hematological: Negative.  Negative for adenopathy. Does not bruise/bleed easily.   Psychiatric/Behavioral: Negative.  Negative for agitation, behavioral problems, confusion, decreased concentration, dysphoric mood, hallucinations, self-injury, sleep disturbance and suicidal ideas. The patient is not nervous/anxious and is not hyperactive.    All other systems reviewed and are negative.      Physical Exam     Initial Vitals [04/13/25 2313]   BP Pulse Resp Temp SpO2   (!) 182/125 89 16 98.6 °F (37 °C) 96 %      MAP       --         Physical Exam    Nursing note and vitals reviewed.  Constitutional: She appears well-developed and well-nourished. She is not diaphoretic. No distress.   Smells of cigarettes and alcohol   HENT:   Head: Normocephalic and atraumatic.   Eyes: Conjunctivae and EOM are normal. Pupils are equal, round, and reactive to light. No scleral icterus.   Neck: Neck supple. No JVD present.   Normal range of motion.  Cardiovascular:  Normal rate, regular rhythm, normal heart sounds and intact distal pulses.           Pulmonary/Chest: Breath sounds normal. No  respiratory distress. She has no wheezes. She exhibits no tenderness.   Abdominal: Abdomen is soft. Bowel sounds are normal. She exhibits no distension.   Musculoskeletal:         General: Normal range of motion.      Cervical back: Normal range of motion and neck supple.     Neurological: She is alert and oriented to person, place, and time.   Skin: Skin is warm and dry. Capillary refill takes less than 2 seconds.   Psychiatric: She has a normal mood and affect.         ED Course   Procedures  Labs Reviewed   COMPREHENSIVE METABOLIC PANEL - Abnormal       Result Value    Sodium 144      Potassium 3.6      Chloride 109 (*)     CO2 24      Glucose 88      Blood Urea Nitrogen 10.0      Creatinine 0.98      Calcium 8.9      Protein Total 7.5      Albumin 4.1      Globulin 3.4      Albumin/Globulin Ratio 1.2      Bilirubin Total 0.5      ALP 92      ALT 13      AST 18      eGFR >60      Anion Gap 11.0      BUN/Creatinine Ratio 10     ALCOHOL,MEDICAL (ETHANOL) - Abnormal    Ethanol Level 124.0 (*)    TROPONIN I - Normal    Troponin-I <0.010     B-TYPE NATRIURETIC PEPTIDE - Normal    Natriuretic Peptide 12.7     MAGNESIUM - Normal    Magnesium Level 2.20     CBC W/ AUTO DIFFERENTIAL    Narrative:     The following orders were created for panel order CBC auto differential.  Procedure                               Abnormality         Status                     ---------                               -----------         ------                     CBC with Differential[[id number]]                           Final result                 Please view results for these tests on the individual orders.   CBC WITH DIFFERENTIAL    WBC 6.08      RBC 5.01      Hgb 15.1      Hct 45.6      MCV 91.0      MCH 30.1      MCHC 33.1      RDW 13.9      Platelet 234      MPV 10.0      Neut % 54.9      Lymph % 30.1      Mono % 8.7      Eos % 5.4      Basophil % 0.7      Imm Grans % 0.2      Neut # 3.34      Lymph # 1.83      Mono # 0.53      Eos #  0.33      Baso # 0.04      Imm Gran # 0.01      NRBC% 0.0       EKG Readings: (Independently Interpreted)   EKG done at 11:26 p.m. on April 13, 2025 shows normal sinus rhythm ventricular rate is 86 beats per minute this is a normal EKG       Imaging Results              X-Ray Chest AP Portable (Preliminary result)  Result time 04/14/25 00:32:15      Wet Read by Margarito Gonzales MD (04/14/25 00:32:15, [facility] Dept, Emergency Medicine)    No acute cardiopulmonary changes noted                                     Medications   cloNIDine tablet 0.2 mg (0.2 mg Oral Given 4/14/25 0039)     Medical Decision Making  59-year-old black female presents with shortness of breaths that worsened today while at a bar shooting pull and drinking.  Differential diagnosis includes was not limited to pneumonia, pneumothorax, congestive heart failure, STEMI, NSTEMI, anxiety, hypertensive emergency, viral syndrome, tobacco abuse.  Workup included EKG was his completely normal and blood work which only shows an alcohol level of 124.  Her blood pressure was 182/125 upon arrival she was given 0.2 of clonidine and monitored and now her blood pressures down to 140/100 so she is good enough to be discharged.  Discussed smoking and alcohol cessation ensuring that she takes her meds on regular basis    Problems Addressed:  Alcoholic intoxication without complication: acute illness or injury  Hypertension, unspecified type: chronic illness or injury with exacerbation, progression, or side effects of treatment that poses a threat to life or bodily functions  Shortness of breath: self-limited or minor problem    Amount and/or Complexity of Data Reviewed  External Data Reviewed: labs, radiology and notes.  Labs: ordered. Decision-making details documented in ED Course.  Radiology: ordered and independent interpretation performed. Decision-making details documented in ED Course.  ECG/medicine tests: ordered and  "independent interpretation performed. Decision-making details documented in ED Course.    Risk  Prescription drug management.                                      Clinical Impression:  Final diagnoses:  [R06.02] Shortness of breath  [F10.920] Alcoholic intoxication without complication (Primary)  [I10] Hypertension, unspecified type          ED Disposition Condition    Discharge Stable          ED Prescriptions    None       Follow-up Information       Follow up With Specialties Details Why Contact Info    Diana Berry MD Family Medicine In 3 days  [facility] [id number]  [phone]            Portions of this note have been created with voice recognition software. Occasional "wrong-words" or "sound alike" substitutions may have occurred due to inherent limitations of voice software. Please read the note carefully and recognize, using context, word substitutions may have occurred.         [1]   Social History  Tobacco Use    Smoking status: Every Day     Current packs/day: 0.50     Average packs/day: 0.5 packs/day for 15.0 years (7.5 ttl pk-yrs)     Types: Cigarettes     Passive exposure: Current    Smokeless tobacco: Never    Tobacco comments:     stop on own   Substance Use Topics    Alcohol use: Yes     Alcohol/week: 1.0 standard drink of alcohol     Types: 1 Cans of beer per week     Comment: every sunday    Drug use: Never        Margarito Gonzales MD  04/14/25 0130    "

## 2025-04-15 LAB
OHS QRS DURATION: 82 MS
OHS QTC CALCULATION: 478 MS

## 2025-06-17 DIAGNOSIS — R10.13 ABDOMINAL PAIN, EPIGASTRIC: Primary | ICD-10-CM

## 2025-06-17 DIAGNOSIS — K59.00 CONSTIPATION: ICD-10-CM

## 2025-06-20 ENCOUNTER — HOSPITAL ENCOUNTER (OUTPATIENT)
Dept: RADIOLOGY | Facility: HOSPITAL | Age: 60
Discharge: HOME | End: 2025-06-20
Payer: MEDICARE

## 2025-06-20 DIAGNOSIS — R10.13 ABDOMINAL PAIN, EPIGASTRIC: ICD-10-CM

## 2025-06-20 DIAGNOSIS — K59.00 CONSTIPATION: ICD-10-CM

## 2025-06-20 PROCEDURE — 76700 US EXAM ABDOM COMPLETE: CPT | Mod: TC
